# Patient Record
Sex: FEMALE | Race: BLACK OR AFRICAN AMERICAN | Employment: PART TIME | URBAN - METROPOLITAN AREA
[De-identification: names, ages, dates, MRNs, and addresses within clinical notes are randomized per-mention and may not be internally consistent; named-entity substitution may affect disease eponyms.]

---

## 2020-08-10 ENCOUNTER — HOSPITAL ENCOUNTER (OUTPATIENT)
Age: 30
Setting detail: SPECIMEN
Discharge: HOME | End: 2020-08-10
Payer: MEDICARE

## 2020-08-10 LAB
-: ABNORMAL
ABO/RH: NORMAL
ABSOLUTE EOS #: 0.06 K/UL (ref 0–0.44)
ABSOLUTE IMMATURE GRANULOCYTE: 0.03 K/UL (ref 0–0.3)
ABSOLUTE LYMPH #: 1.27 K/UL (ref 1.1–3.7)
ABSOLUTE MONO #: 0.53 K/UL (ref 0.1–1.2)
AMORPHOUS: ABNORMAL
AMPHETAMINE SCREEN URINE: NEGATIVE
ANTIBODY SCREEN: NEGATIVE
BACTERIA: ABNORMAL
BARBITURATE SCREEN URINE: NEGATIVE
BASOPHILS # BLD: 0 % (ref 0–2)
BASOPHILS ABSOLUTE: <0.03 K/UL (ref 0–0.2)
BENZODIAZEPINE SCREEN, URINE: NEGATIVE
BILIRUBIN URINE: NEGATIVE
BUPRENORPHINE URINE: NORMAL
CANNABINOID SCREEN URINE: NEGATIVE
CASTS UA: ABNORMAL /LPF (ref 0–2)
COCAINE METABOLITE, URINE: NEGATIVE
COLOR: ABNORMAL
COMMENT UA: ABNORMAL
CRYSTALS, UA: ABNORMAL /HPF
DIFFERENTIAL TYPE: ABNORMAL
EOSINOPHILS RELATIVE PERCENT: 1 % (ref 1–4)
EPITHELIAL CELLS UA: ABNORMAL /HPF (ref 0–5)
GLUCOSE URINE: NEGATIVE
HCT VFR BLD CALC: 37.2 % (ref 36.3–47.1)
HEMOGLOBIN: 12.5 G/DL (ref 11.9–15.1)
HEPATITIS B SURFACE ANTIGEN: NONREACTIVE
IMMATURE GRANULOCYTES: 1 %
KETONES, URINE: NEGATIVE
LEUKOCYTE ESTERASE, URINE: ABNORMAL
LYMPHOCYTES # BLD: 21 % (ref 24–43)
MCH RBC QN AUTO: 28.2 PG (ref 25.2–33.5)
MCHC RBC AUTO-ENTMCNC: 33.6 G/DL (ref 28.4–34.8)
MCV RBC AUTO: 83.8 FL (ref 82.6–102.9)
MDMA URINE: NORMAL
METHADONE SCREEN, URINE: NEGATIVE
METHAMPHETAMINE, URINE: NORMAL
MONOCYTES # BLD: 9 % (ref 3–12)
MUCUS: ABNORMAL
NITRITE, URINE: NEGATIVE
NRBC AUTOMATED: 0 PER 100 WBC
OPIATES, URINE: NEGATIVE
OTHER OBSERVATIONS UA: ABNORMAL
OXYCODONE SCREEN URINE: NEGATIVE
PDW BLD-RTO: 13.7 % (ref 11.8–14.4)
PH UA: 7 (ref 5–8)
PHENCYCLIDINE, URINE: NEGATIVE
PLATELET # BLD: 309 K/UL (ref 138–453)
PLATELET ESTIMATE: ABNORMAL
PMV BLD AUTO: 10.8 FL (ref 8.1–13.5)
PROPOXYPHENE, URINE: NORMAL
PROTEIN UA: ABNORMAL
RBC # BLD: 4.44 M/UL (ref 3.95–5.11)
RBC # BLD: ABNORMAL 10*6/UL
RBC UA: ABNORMAL /HPF (ref 0–2)
RENAL EPITHELIAL, UA: ABNORMAL /HPF
RUBV IGG SER QL: 15.8 IU/ML
SEG NEUTROPHILS: 68 % (ref 36–65)
SEGMENTED NEUTROPHILS ABSOLUTE COUNT: 4.09 K/UL (ref 1.5–8.1)
SPECIFIC GRAVITY UA: 1.02 (ref 1–1.03)
T. PALLIDUM, IGG: NONREACTIVE
TEST INFORMATION: NORMAL
TRICHOMONAS: ABNORMAL
TRICYCLIC ANTIDEPRESSANTS, UR: NORMAL
TURBIDITY: ABNORMAL
URINE HGB: NEGATIVE
UROBILINOGEN, URINE: NORMAL
WBC # BLD: 6 K/UL (ref 3.5–11.3)
WBC # BLD: ABNORMAL 10*3/UL
WBC UA: ABNORMAL /HPF (ref 0–5)
YEAST: ABNORMAL

## 2020-08-11 LAB
CULTURE: NORMAL
Lab: NORMAL
SPECIMEN DESCRIPTION: NORMAL

## 2020-08-13 ENCOUNTER — HOSPITAL ENCOUNTER (OUTPATIENT)
Age: 30
Setting detail: SPECIMEN
Discharge: HOME | End: 2020-08-13
Payer: MEDICARE

## 2020-08-15 LAB
HPV SAMPLE: ABNORMAL
HPV, GENOTYPE 16: NOT DETECTED
HPV, GENOTYPE 18: NOT DETECTED
HPV, HIGH RISK OTHER: DETECTED
HPV, INTERPRETATION: ABNORMAL
SPECIMEN DESCRIPTION: ABNORMAL

## 2020-08-16 LAB
CHLAMYDIA BY THIN PREP: NEGATIVE
N. GONORRHOEAE DNA, THIN PREP: NEGATIVE
SPECIMEN DESCRIPTION: NORMAL

## 2020-08-20 LAB — CYTOLOGY REPORT: NORMAL

## 2020-08-24 LAB
SEND OUT REPORT: NORMAL
TEST NAME: NORMAL

## 2020-09-01 ENCOUNTER — TELEPHONE (OUTPATIENT)
Dept: OBGYN CLINIC | Age: 30
End: 2020-09-01

## 2020-09-01 NOTE — TELEPHONE ENCOUNTER
Please call patient - her pap results are abnormal.  Cytology negative, positive for HPV but negative for 16/18 types.   Therefore we can repeat cotesting in 12 months

## 2020-09-16 ENCOUNTER — ROUTINE PRENATAL (OUTPATIENT)
Dept: OBGYN CLINIC | Age: 30
End: 2020-09-16
Payer: MEDICARE

## 2020-09-16 VITALS — DIASTOLIC BLOOD PRESSURE: 92 MMHG | WEIGHT: 204 LBS | SYSTOLIC BLOOD PRESSURE: 134 MMHG

## 2020-09-16 PROCEDURE — 1036F TOBACCO NON-USER: CPT | Performed by: OBSTETRICS & GYNECOLOGY

## 2020-09-16 PROCEDURE — G8421 BMI NOT CALCULATED: HCPCS | Performed by: OBSTETRICS & GYNECOLOGY

## 2020-09-16 PROCEDURE — 99213 OFFICE O/P EST LOW 20 MIN: CPT | Performed by: OBSTETRICS & GYNECOLOGY

## 2020-09-16 PROCEDURE — G8427 DOCREV CUR MEDS BY ELIG CLIN: HCPCS | Performed by: OBSTETRICS & GYNECOLOGY

## 2020-09-16 NOTE — PROGRESS NOTES
Mounika Jasmine is here at 16w0d for:    Chief Complaint   Patient presents with    Routine Prenatal Visit     denies concerns. Estimated Due Date: Estimated Date of Delivery: 3/3/21    OB History    Para Term  AB Living   3 1 1   1     SAB TAB Ectopic Molar Multiple Live Births   1                # Outcome Date GA Lbr Eamon/2nd Weight Sex Delivery Anes PTL Lv   3 Current            2 SAB 2019           1 Term 12   7 lb 11 oz (3.487 kg) F Vag-Spont           Past Medical History:   Diagnosis Date    Cocaine abuse ([facility] [address]     Trichomonal vulvovaginitis        Past Surgical History:   Procedure Laterality Date    DILATION AND CURETTAGE OF UTERUS  2019       Social History     Tobacco Use   Smoking Status Never Smoker   Smokeless Tobacco Never Used        Social History     Substance and Sexual Activity   Alcohol Use Not Currently       No results found for this visit on 20. Vitals:  BP (!) 134/92   Wt 204 lb (92.5 kg)   There is no height or weight on file to calculate BMI.      HPI: here for new ob - states she is anxious d/t hx of miscarriage - will watch bp closely    Yes PT denies fever, chills, nausea and vomiting       Abdomen: enlarged, gravid     Results reviewed today:    No results found for this visit on 20. See prenatal vital sign section and fetal assessment section    ASSESSMENT & Plan    Diagnosis Orders   1. 16 weeks gestation of pregnancy               I am having Mounkia Jasmine maintain her Prenatal Vit-Fe Fumarate-FA (PRENATAL PO). Return in about 4 weeks (around 10/14/2020) for ob, usn. There are no Patient Instructions on file for this visit.           Aaron Amos,2020 10:50 AM

## 2020-10-08 ENCOUNTER — TELEPHONE (OUTPATIENT)
Dept: OBGYN CLINIC | Age: 30
End: 2020-10-08

## 2020-10-08 NOTE — TELEPHONE ENCOUNTER
Justin Neil requested usn be moved from 10-19 to 10-21 due to an abundance of patients on 10-19. I called ptKENNEY to call and RS.

## 2020-10-20 ENCOUNTER — TELEPHONE (OUTPATIENT)
Dept: OBGYN CLINIC | Age: 30
End: 2020-10-20

## 2020-10-20 NOTE — TELEPHONE ENCOUNTER
Left voicemail to remind patient of her upcoming ultrasound appointment on 10-21-20 at 2:00pm and a provider appointment at 2:45pm with [facility].

## 2020-10-21 ENCOUNTER — ROUTINE PRENATAL (OUTPATIENT)
Dept: OBGYN CLINIC | Age: 30
End: 2020-10-21
Payer: MEDICARE

## 2020-10-21 VITALS — SYSTOLIC BLOOD PRESSURE: 124 MMHG | WEIGHT: 207 LBS | DIASTOLIC BLOOD PRESSURE: 80 MMHG

## 2020-10-21 PROCEDURE — 99213 OFFICE O/P EST LOW 20 MIN: CPT | Performed by: NURSE PRACTITIONER

## 2020-10-28 ENCOUNTER — HOSPITAL ENCOUNTER (OUTPATIENT)
Age: 30
Discharge: HOME | End: 2020-10-28
Attending: MIDWIFE | Admitting: MIDWIFE
Payer: MEDICARE

## 2020-10-28 ENCOUNTER — TELEPHONE (OUTPATIENT)
Dept: OBGYN | Age: 30
End: 2020-10-28

## 2020-10-28 VITALS
RESPIRATION RATE: 16 BRPM | SYSTOLIC BLOOD PRESSURE: 141 MMHG | TEMPERATURE: 98 F | DIASTOLIC BLOOD PRESSURE: 84 MMHG | HEART RATE: 71 BPM

## 2020-10-28 PROBLEM — R10.9 ABDOMINAL PAIN: Status: ACTIVE | Noted: 2020-10-28

## 2020-10-28 PROCEDURE — 85460 HEMOGLOBIN FETAL: CPT

## 2020-10-28 ASSESSMENT — PAIN DESCRIPTION - DESCRIPTORS: DESCRIPTORS: SHARP

## 2020-10-28 NOTE — LETTER
[address] [facility] and Delivery  [facility] [address]  Phone: [phone]          October 28, 2020     Patient: Neetu Rodriguez   YOB: 1990   Date of Visit: 10/28/2020       To Whom it May Concern:    Neetu Rodriguez was seen in my clinic on 10/28/2020. She may return to school on 11/30/2020. .    If you have any questions or concerns, please don't hesitate to call.     Sincerely,     [address] OB Department    Ordered by Boogie Patel CNM

## 2020-10-28 NOTE — FLOWSHEET NOTE
Phoned KWASI Griffin CNM with report of fall, BP results, abraded area on abdomen, no leaking bleeding or contractions. See Orders. KWASI Griffin updated B FREDERICK jones who was by chance in the department and no other orders received.

## 2020-10-29 ENCOUNTER — ROUTINE PRENATAL (OUTPATIENT)
Dept: OBGYN CLINIC | Age: 30
End: 2020-10-29
Payer: MEDICARE

## 2020-10-29 VITALS
DIASTOLIC BLOOD PRESSURE: 82 MMHG | WEIGHT: 209 LBS | SYSTOLIC BLOOD PRESSURE: 148 MMHG | BODY MASS INDEX: 31.67 KG/M2 | HEIGHT: 68 IN

## 2020-10-29 LAB
% FETAL BLEED: NORMAL %
FETAL BLEED VOLUME: NORMAL ML
RHOGAM DOSES REQUIRED: NORMAL

## 2020-10-29 PROCEDURE — G8484 FLU IMMUNIZE NO ADMIN: HCPCS | Performed by: OBSTETRICS & GYNECOLOGY

## 2020-10-29 PROCEDURE — G8419 CALC BMI OUT NRM PARAM NOF/U: HCPCS | Performed by: OBSTETRICS & GYNECOLOGY

## 2020-10-29 PROCEDURE — 99213 OFFICE O/P EST LOW 20 MIN: CPT | Performed by: OBSTETRICS & GYNECOLOGY

## 2020-10-29 PROCEDURE — G8427 DOCREV CUR MEDS BY ELIG CLIN: HCPCS | Performed by: OBSTETRICS & GYNECOLOGY

## 2020-10-29 PROCEDURE — 1036F TOBACCO NON-USER: CPT | Performed by: OBSTETRICS & GYNECOLOGY

## 2020-10-29 ASSESSMENT — ENCOUNTER SYMPTOMS: ABDOMINAL PAIN: 1

## 2020-10-29 NOTE — PROGRESS NOTES
DATE OF VISIT:  10/29/20    PATIENT NAME:  Popeye Rings OF BIRTH: 1990    REASON FOR VISIT:    Chief Complaint   Patient presents with    Follow-up     Pt. fell yesterday. She was seen yesterday at [facility]. She was standing on a chair changing a smoke detector and fell. Her stomach took a direct her to the back of the chair. Abdomen feels tender/sore. HISTORY OF PRESENT ILLNESS:  Pt fell yesterday off a chair - she states she fell onto the back of the chair with her abdomen; no vag bleeding; no LOF; no contractions; KB from L&D still pending      No LMP recorded. Patient is pregnant. Vitals:    10/29/20 1107 10/29/20 1112   BP: (!) 150/88 (!) 148/82   Weight: 209 lb (94.8 kg)    Height: 5' 8\" (1.727 m)      Body mass index is 31.78 kg/m². No Known Allergies  Current Outpatient Medications   Medication Sig Dispense Refill    Prenatal Vit-Fe Fumarate-FA (PRENATAL PO) Take by mouth       No current facility-administered medications for this visit.       Social History     Socioeconomic History    Marital status: Single     Spouse name: Not on file    Number of children: Not on file    Years of education: Not on file    Highest education level: Not on file   Occupational History    Not on file   Social Needs    Financial resource strain: Not on file    Food insecurity     Worry: Not on file     Inability: Not on file    Transportation needs     Medical: Not on file     Non-medical: Not on file   Tobacco Use    Smoking status: Never Smoker    Smokeless tobacco: Never Used   Substance and Sexual Activity    Alcohol use: Not Currently    Drug use: Never    Sexual activity: Yes   Lifestyle    Physical activity     Days per week: Not on file     Minutes per session: Not on file    Stress: Not on file   Relationships    Social connections     Talks on phone: Not on file     Gets together: Not on file     Attends [employer] service: Not on file     Active member of club or organization: Not on file     Attends meetings of clubs or organizations: Not on file     Relationship status: Not on file    Intimate partner violence     Fear of current or ex partner: Not on file     Emotionally abused: Not on file     Physically abused: Not on file     Forced sexual activity: Not on file   Other Topics Concern    Not on file   Social History Narrative    Not on file       REVIEW OF SYSTEMS:  Review of Systems   Gastrointestinal: Positive for abdominal pain (sore from fall). Genitourinary: Negative for pelvic pain, vaginal bleeding and vaginal discharge. PHYSICAL EXAM:  BP (!) 148/82   Ht 5' 8\" (1.727 m)   Wt 209 lb (94.8 kg)   BMI 31.78 kg/m²   Physical Exam  Constitutional:       Appearance: Normal appearance. HENT:      Head: Normocephalic and atraumatic. Eyes:      Extraocular Movements: Extraocular movements intact. Pupils: Pupils are equal, round, and reactive to light. Neck:      Musculoskeletal: Normal range of motion. Cardiovascular:      Rate and Rhythm: Normal rate. Pulmonary:      Effort: Pulmonary effort is normal.   Abdominal:      General: Abdomen is flat. Palpations: Abdomen is soft. Tenderness: There is no rebound. Musculoskeletal: Normal range of motion. Neurological:      Mental Status: She is alert and oriented to person, place, and time. Skin:     General: Skin is warm and dry. Psychiatric:         Mood and Affect: Mood normal.         Behavior: Behavior normal.         Thought Content: Thought content normal.         Judgment: Judgment normal.         ASSESSMENT:      1. Fall, subsequent encounter    2. 22 weeks gestation of pregnancy        PLAN:  No orders of the defined types were placed in this encounter. Return in about 2 weeks (around 11/12/2020) for as scheduled. Pt to call if bleeding or zach.     Electronically signed by Radha Young DO on 10/29/20

## 2020-11-10 ENCOUNTER — TELEPHONE (OUTPATIENT)
Dept: OBGYN CLINIC | Age: 30
End: 2020-11-10

## 2020-11-10 ENCOUNTER — HOSPITAL ENCOUNTER (OUTPATIENT)
Age: 30
Setting detail: SPECIMEN
Discharge: HOME | End: 2020-11-10
Payer: MEDICARE

## 2020-11-10 ENCOUNTER — ROUTINE PRENATAL (OUTPATIENT)
Dept: OBGYN CLINIC | Age: 30
End: 2020-11-10
Payer: MEDICARE

## 2020-11-10 VITALS — DIASTOLIC BLOOD PRESSURE: 86 MMHG | WEIGHT: 210.6 LBS | BODY MASS INDEX: 32.02 KG/M2 | SYSTOLIC BLOOD PRESSURE: 138 MMHG

## 2020-11-10 LAB
ABSOLUTE EOS #: 0.1 K/UL (ref 0–0.44)
ABSOLUTE IMMATURE GRANULOCYTE: 0.04 K/UL (ref 0–0.3)
ABSOLUTE LYMPH #: 1.54 K/UL (ref 1.1–3.7)
ABSOLUTE MONO #: 0.47 K/UL (ref 0.1–1.2)
ALBUMIN SERPL-MCNC: 3.6 G/DL (ref 3.5–5.2)
ALBUMIN/GLOBULIN RATIO: 1.3 (ref 1–2.5)
ALP BLD-CCNC: 81 U/L (ref 35–104)
ALT SERPL-CCNC: 17 U/L (ref 5–33)
ANION GAP SERPL CALCULATED.3IONS-SCNC: 12 MMOL/L (ref 9–17)
AST SERPL-CCNC: 20 U/L
BASOPHILS # BLD: 0 % (ref 0–2)
BASOPHILS ABSOLUTE: <0.03 K/UL (ref 0–0.2)
BILIRUB SERPL-MCNC: 0.3 MG/DL (ref 0.3–1.2)
BUN BLDV-MCNC: 4 MG/DL (ref 6–20)
BUN/CREAT BLD: ABNORMAL (ref 9–20)
CALCIUM SERPL-MCNC: 8.8 MG/DL (ref 8.6–10.4)
CHLORIDE BLD-SCNC: 102 MMOL/L (ref 98–107)
CO2: 25 MMOL/L (ref 20–31)
CREAT SERPL-MCNC: 0.47 MG/DL (ref 0.5–0.9)
CREATININE URINE: 111.7 MG/DL (ref 28–217)
DIFFERENTIAL TYPE: ABNORMAL
EOSINOPHILS RELATIVE PERCENT: 2 % (ref 1–4)
GFR AFRICAN AMERICAN: >60 ML/MIN
GFR NON-AFRICAN AMERICAN: >60 ML/MIN
GFR SERPL CREATININE-BSD FRML MDRD: ABNORMAL ML/MIN/{1.73_M2}
GFR SERPL CREATININE-BSD FRML MDRD: ABNORMAL ML/MIN/{1.73_M2}
GLUCOSE BLD-MCNC: 65 MG/DL (ref 70–99)
HCT VFR BLD CALC: 33 % (ref 36.3–47.1)
HEMOGLOBIN: 10.6 G/DL (ref 11.9–15.1)
IMMATURE GRANULOCYTES: 1 %
LYMPHOCYTES # BLD: 25 % (ref 24–43)
MCH RBC QN AUTO: 28.7 PG (ref 25.2–33.5)
MCHC RBC AUTO-ENTMCNC: 32.1 G/DL (ref 28.4–34.8)
MCV RBC AUTO: 89.4 FL (ref 82.6–102.9)
MONOCYTES # BLD: 8 % (ref 3–12)
NRBC AUTOMATED: 0 PER 100 WBC
PDW BLD-RTO: 14.8 % (ref 11.8–14.4)
PLATELET # BLD: 247 K/UL (ref 138–453)
PLATELET ESTIMATE: ABNORMAL
PMV BLD AUTO: 11.7 FL (ref 8.1–13.5)
POTASSIUM SERPL-SCNC: 3.3 MMOL/L (ref 3.7–5.3)
RBC # BLD: 3.69 M/UL (ref 3.95–5.11)
RBC # BLD: ABNORMAL 10*6/UL
SEG NEUTROPHILS: 64 % (ref 36–65)
SEGMENTED NEUTROPHILS ABSOLUTE COUNT: 3.9 K/UL (ref 1.5–8.1)
SODIUM BLD-SCNC: 139 MMOL/L (ref 135–144)
TOTAL PROTEIN, URINE: 29 MG/DL
TOTAL PROTEIN: 6.3 G/DL (ref 6.4–8.3)
URIC ACID: 3.5 MG/DL (ref 2.4–5.7)
URINE TOTAL PROTEIN CREATININE RATIO: 0.26 (ref 0–0.2)
WBC # BLD: 6.1 K/UL (ref 3.5–11.3)
WBC # BLD: ABNORMAL 10*3/UL

## 2020-11-10 PROCEDURE — 36415 COLL VENOUS BLD VENIPUNCTURE: CPT | Performed by: NURSE PRACTITIONER

## 2020-11-10 PROCEDURE — 99213 OFFICE O/P EST LOW 20 MIN: CPT | Performed by: NURSE PRACTITIONER

## 2020-11-10 RX ORDER — LABETALOL 100 MG/1
100 TABLET, FILM COATED ORAL DAILY
Qty: 30 TABLET | Refills: 1 | Status: SHIPPED | OUTPATIENT
Start: 2020-11-10 | End: 2020-12-23

## 2020-11-10 NOTE — PROGRESS NOTES
Manish Calhoun is a 27 y.o. female 24w9d    The patient was seen and evaluated. There was positive fetal movements. No contractions or leakage of fluid. Signs and symptoms of  labor as well as labor were reviewed. The S/S of Pre-Eclampsia were reviewed with the patient in detail. She is to report any of these if they occur. She currently denies any of these. The patient is RH positive Rhogam Ordered no    The patient was instructed on fetal kick counts and was encouraged to monitor every 8 hours. This is to begin at 28 weeks gestation. She was instructed that the baby should move at a minimum of ten times within one hour after a meal. The patient was instructed to lay down on her left side twenty minutes after eating and count movements for up to one hour with a target value of ten movements. She was instructed to notify the office if she did not make that target after two attempts or if after any attempt there was less than four movements. No Patient Care Coordination Note on file. Assessment:  1. Manish Calhoun is a 27 y.o. female  2.   3. 23w6d    Patient Active Problem List    Diagnosis Date Noted    Abdominal pain 10/28/2020        Diagnosis Orders   1. Chronic hypertension affecting pregnancy  CBC Auto Differential    Comprehensive Metabolic Panel    Uric Acid    Protein / creatinine ratio, urine    labetalol (NORMODYNE) 100 MG tablet   2. 23 weeks gestation of pregnancy       Patient with multiple elevated BP readings. Discussed CHTN and starting labetalol 100mg daily and BP check in 1 week. Will start weekly NST & BPP at 32 weeks, growth q4 weeks and delivery between 37-38 weeks. Recommended bASA daily. Will complete [facility] labs today. Case/reviewed with Dr. Meet Beasley and she agrees     Plan:  The patient will return to the office for her next visit in 4 weeks. See antepartum flow sheet.

## 2020-11-16 RX ORDER — FERROUS SULFATE 325(65) MG
325 TABLET ORAL
Qty: 90 TABLET | Refills: 3 | Status: ON HOLD | OUTPATIENT
Start: 2020-11-16 | End: 2021-02-04 | Stop reason: HOSPADM

## 2020-11-17 ENCOUNTER — OFFICE VISIT (OUTPATIENT)
Dept: OBGYN CLINIC | Age: 30
End: 2020-11-17
Payer: MEDICARE

## 2020-11-17 VITALS — SYSTOLIC BLOOD PRESSURE: 128 MMHG | DIASTOLIC BLOOD PRESSURE: 82 MMHG

## 2020-11-17 PROCEDURE — 99211 OFF/OP EST MAY X REQ PHY/QHP: CPT | Performed by: NURSE PRACTITIONER

## 2020-11-17 NOTE — PROGRESS NOTES
Here for BP check. No complaints today. Taking Labetalol 100mg daily. Denies h/a, visual disturbances, n/v, swelling. BP today 128/82. M Dolores Hserin aware and pt to continue Labetalol as prescribed and keep next scheduled appt. Will call office with any new s/s of increased BP.

## 2020-12-09 ENCOUNTER — ROUTINE PRENATAL (OUTPATIENT)
Dept: OBGYN CLINIC | Age: 30
End: 2020-12-09
Payer: MEDICARE

## 2020-12-09 ENCOUNTER — HOSPITAL ENCOUNTER (OUTPATIENT)
Age: 30
Setting detail: SPECIMEN
Discharge: HOME | End: 2020-12-09
Payer: MEDICARE

## 2020-12-09 VITALS — BODY MASS INDEX: 32.54 KG/M2 | SYSTOLIC BLOOD PRESSURE: 146 MMHG | WEIGHT: 214 LBS | DIASTOLIC BLOOD PRESSURE: 64 MMHG

## 2020-12-09 LAB
ABSOLUTE EOS #: 0.08 K/UL (ref 0–0.44)
ABSOLUTE IMMATURE GRANULOCYTE: <0.03 K/UL (ref 0–0.3)
ABSOLUTE LYMPH #: 1.17 K/UL (ref 1.1–3.7)
ABSOLUTE MONO #: 0.39 K/UL (ref 0.1–1.2)
ALBUMIN SERPL-MCNC: 3.2 G/DL (ref 3.5–5.2)
ALBUMIN/GLOBULIN RATIO: 1.1 (ref 1–2.5)
ALP BLD-CCNC: 88 U/L (ref 35–104)
ALT SERPL-CCNC: 14 U/L (ref 5–33)
ANION GAP SERPL CALCULATED.3IONS-SCNC: 14 MMOL/L (ref 9–17)
AST SERPL-CCNC: 16 U/L
BASOPHILS # BLD: 0 % (ref 0–2)
BASOPHILS ABSOLUTE: <0.03 K/UL (ref 0–0.2)
BILIRUB SERPL-MCNC: 0.23 MG/DL (ref 0.3–1.2)
BUN BLDV-MCNC: 4 MG/DL (ref 6–20)
BUN/CREAT BLD: ABNORMAL (ref 9–20)
CALCIUM SERPL-MCNC: 8.2 MG/DL (ref 8.6–10.4)
CHLORIDE BLD-SCNC: 103 MMOL/L (ref 98–107)
CO2: 25 MMOL/L (ref 20–31)
CREAT SERPL-MCNC: 0.42 MG/DL (ref 0.5–0.9)
DIFFERENTIAL TYPE: ABNORMAL
EOSINOPHILS RELATIVE PERCENT: 2 % (ref 1–4)
GFR AFRICAN AMERICAN: >60 ML/MIN
GFR NON-AFRICAN AMERICAN: >60 ML/MIN
GFR SERPL CREATININE-BSD FRML MDRD: ABNORMAL ML/MIN/{1.73_M2}
GFR SERPL CREATININE-BSD FRML MDRD: ABNORMAL ML/MIN/{1.73_M2}
GLUCOSE ADMINISTRATION: NORMAL
GLUCOSE BLD-MCNC: 101 MG/DL (ref 70–99)
GLUCOSE TOLERANCE SCREEN 50G: 102 MG/DL (ref 70–135)
HCT VFR BLD CALC: 33 % (ref 36.3–47.1)
HEMOGLOBIN: 10.5 G/DL (ref 11.9–15.1)
IMMATURE GRANULOCYTES: 0 %
LYMPHOCYTES # BLD: 24 % (ref 24–43)
MCH RBC QN AUTO: 28.3 PG (ref 25.2–33.5)
MCHC RBC AUTO-ENTMCNC: 31.8 G/DL (ref 28.4–34.8)
MCV RBC AUTO: 88.9 FL (ref 82.6–102.9)
MONOCYTES # BLD: 8 % (ref 3–12)
NRBC AUTOMATED: 0 PER 100 WBC
PDW BLD-RTO: 14.1 % (ref 11.8–14.4)
PLATELET # BLD: 213 K/UL (ref 138–453)
PLATELET ESTIMATE: ABNORMAL
PMV BLD AUTO: 12.1 FL (ref 8.1–13.5)
POTASSIUM SERPL-SCNC: 3 MMOL/L (ref 3.7–5.3)
RBC # BLD: 3.71 M/UL (ref 3.95–5.11)
RBC # BLD: ABNORMAL 10*6/UL
SEG NEUTROPHILS: 66 % (ref 36–65)
SEGMENTED NEUTROPHILS ABSOLUTE COUNT: 3.29 K/UL (ref 1.5–8.1)
SODIUM BLD-SCNC: 142 MMOL/L (ref 135–144)
TOTAL PROTEIN: 6 G/DL (ref 6.4–8.3)
WBC # BLD: 5 K/UL (ref 3.5–11.3)
WBC # BLD: ABNORMAL 10*3/UL

## 2020-12-09 PROCEDURE — G8427 DOCREV CUR MEDS BY ELIG CLIN: HCPCS | Performed by: OBSTETRICS & GYNECOLOGY

## 2020-12-09 PROCEDURE — G8484 FLU IMMUNIZE NO ADMIN: HCPCS | Performed by: OBSTETRICS & GYNECOLOGY

## 2020-12-09 PROCEDURE — 99213 OFFICE O/P EST LOW 20 MIN: CPT | Performed by: OBSTETRICS & GYNECOLOGY

## 2020-12-09 PROCEDURE — 1036F TOBACCO NON-USER: CPT | Performed by: OBSTETRICS & GYNECOLOGY

## 2020-12-09 PROCEDURE — G8419 CALC BMI OUT NRM PARAM NOF/U: HCPCS | Performed by: OBSTETRICS & GYNECOLOGY

## 2020-12-09 NOTE — PROGRESS NOTES
Afshan Peters is a 27 y.o. female 31w0d    The patient was seen and evaluated. There was positive fetal movements. No contractions or leakage of fluid. Signs and symptoms of  labor as well as labor were reviewed. The S/S of Pre-Eclampsia were reviewed with the patient in detail. She is to report any of these if they occur. She currently denies any of these. The patient had her 28 week labs in process. Hospital Outpatient Visit on 11/10/2020   Component Date Value Ref Range Status    Uric Acid 11/10/2020 3.5  2.4 - 5.7 mg/dL Final    Glucose 11/10/2020 65* 70 - 99 mg/dL Final    BUN 11/10/2020 4* 6 - 20 mg/dL Final    CREATININE 11/10/2020 0.47* 0.50 - 0.90 mg/dL Final    Bun/Cre Ratio 11/10/2020 NOT REPORTED   Final    Calcium 11/10/2020 8.8  8.6 - 10.4 mg/dL Final    Sodium 11/10/2020 139  135 - 144 mmol/L Final    Potassium 11/10/2020 3.3* 3.7 - 5.3 mmol/L Final    Chloride 11/10/2020 102  98 - 107 mmol/L Final    CO2 11/10/2020 25  20 - 31 mmol/L Final    Anion Gap 11/10/2020 12  9 - 17 mmol/L Final    Alkaline Phosphatase 11/10/2020 81  35 - 104 U/L Final    ALT 11/10/2020 17  5 - 33 U/L Final    AST 11/10/2020 20  <32 U/L Final    Total Bilirubin 11/10/2020 0.30  0.3 - 1.2 mg/dL Final    Total Protein 11/10/2020 6.3* 6.4 - 8.3 g/dL Final    Alb 11/10/2020 3.6  3.5 - 5.2 g/dL Final    Albumin/Globulin Ratio 11/10/2020 1.3  1.0 - 2.5 Final    GFR Non- 11/10/2020 >60  >60 mL/min Final    GFR  11/10/2020 >60  >60 mL/min Final    GFR Comment 11/10/2020        Final    Comment: Average GFR for 30-36 years old:   80 mL/min/1.73sq m  Chronic Kidney Disease:   <60 mL/min/1.73sq m  Kidney failure:   <15 mL/min/1.73sq m              eGFR calculated using average adult body mass.  Additional eGFR calculator available at:        [email or web address]            GFR Staging 11/10/2020 NOT REPORTED   Final    WBC 11/10/2020 6.1  3.5 - 11.3 k/uL Final    RBC 11/10/2020 3.69* 3.95 - 5.11 m/uL Final    Hemoglobin 11/10/2020 10.6* 11.9 - 15.1 g/dL Final    Hematocrit 11/10/2020 33.0* 36.3 - 47.1 % Final    MCV 11/10/2020 89.4  82.6 - 102.9 fL Final    MCH 11/10/2020 28.7  25.2 - 33.5 pg Final    MCHC 11/10/2020 32.1  28.4 - 34.8 g/dL Final    RDW 11/10/2020 14.8* 11.8 - 14.4 % Final    Platelets 87/37/7416 247  138 - 453 k/uL Final    MPV 11/10/2020 11.7  8.1 - 13.5 fL Final    NRBC Automated 11/10/2020 0.0  0.0 per 100 WBC Final    Differential Type 11/10/2020 NOT REPORTED   Final    Seg Neutrophils 11/10/2020 64  36 - 65 % Final    Lymphocytes 11/10/2020 25  24 - 43 % Final    Monocytes 11/10/2020 8  3 - 12 % Final    Eosinophils % 11/10/2020 2  1 - 4 % Final    Basophils 11/10/2020 0  0 - 2 % Final    Immature Granulocytes 11/10/2020 1* 0 % Final    Segs Absolute 11/10/2020 3.90  1.50 - 8.10 k/uL Final    Absolute Lymph # 11/10/2020 1.54  1.10 - 3.70 k/uL Final    Absolute Mono # 11/10/2020 0.47  0.10 - 1.20 k/uL Final    Absolute Eos # 11/10/2020 0.10  0.00 - 0.44 k/uL Final    Basophils Absolute 11/10/2020 <0.03  0.00 - 0.20 k/uL Final    Absolute Immature Granulocyte 11/10/2020 0.04  0.00 - 0.30 k/uL Final    WBC Morphology 11/10/2020 NOT REPORTED   Final    RBC Morphology 11/10/2020 ANISOCYTOSIS PRESENT   Final    Platelet Estimate 35/56/4109 NOT REPORTED   Final    Total Protein, Urine 11/10/2020 29  mg/dL Final    No normal range established.  Creatinine, Ur 11/10/2020 111.7  28.0 - 217.0 mg/dL Final    Urine Total Protein Creatinine Rat* 11/10/2020 0.26* 0.00 - 0.20 Final       No Patient Care Coordination Note on file. T-Dap Vaccine (27-36 weeks): awaiting    The patient was instructed on fetal kick counts and is encouraged to monitor every 8 hours.  She was instructed that the baby should move at a minimum of ten times within one hour after a meal. The patient was instructed to lay down on her left side twenty minutes after eating and count movements for up to one hour with a target value of ten movements. She was instructed to notify the office if she did not make that target after two attempts or if after any attempt there was less than four movements. The patient reports that the fetal movement targets have been made Yes.  Testing: Will need as bp is elevated - getting cbc and cmp with gtt today    Assessment:  1Denis Bowden is a 27 y.o. female  2.   3. 28w0d    Patient Active Problem List    Diagnosis Date Noted    Abdominal pain 10/28/2020        Diagnosis Orders   1. 28 weeks gestation of pregnancy  CBC With Auto Differential    Glucose Tolerance, 1 Hr         Plan:  The patient will return to the office for her next visit in 2 weeks. See antepartum flow sheet. Visits will continue every 2 weeks in the third trimester. At 36 weeks will have GBS swab performed and begin weekly visits.      Testing Indicated: Yes  Scheduled after 32 weeks

## 2020-12-21 ENCOUNTER — TELEPHONE (OUTPATIENT)
Dept: OBGYN CLINIC | Age: 30
End: 2020-12-21

## 2020-12-22 ENCOUNTER — TELEPHONE (OUTPATIENT)
Dept: OBGYN CLINIC | Age: 30
End: 2020-12-22

## 2020-12-23 ENCOUNTER — ROUTINE PRENATAL (OUTPATIENT)
Dept: OBGYN CLINIC | Age: 30
End: 2020-12-23
Payer: MEDICARE

## 2020-12-23 VITALS — WEIGHT: 211.4 LBS | BODY MASS INDEX: 32.14 KG/M2 | DIASTOLIC BLOOD PRESSURE: 82 MMHG | SYSTOLIC BLOOD PRESSURE: 140 MMHG

## 2020-12-23 PROCEDURE — G8484 FLU IMMUNIZE NO ADMIN: HCPCS | Performed by: ADVANCED PRACTICE MIDWIFE

## 2020-12-23 PROCEDURE — G8419 CALC BMI OUT NRM PARAM NOF/U: HCPCS | Performed by: ADVANCED PRACTICE MIDWIFE

## 2020-12-23 PROCEDURE — 1036F TOBACCO NON-USER: CPT | Performed by: ADVANCED PRACTICE MIDWIFE

## 2020-12-23 PROCEDURE — G8427 DOCREV CUR MEDS BY ELIG CLIN: HCPCS | Performed by: ADVANCED PRACTICE MIDWIFE

## 2020-12-23 PROCEDURE — 99213 OFFICE O/P EST LOW 20 MIN: CPT | Performed by: ADVANCED PRACTICE MIDWIFE

## 2020-12-23 RX ORDER — LABETALOL 100 MG/1
100 TABLET, FILM COATED ORAL 2 TIMES DAILY
Qty: 60 TABLET | Refills: 2 | Status: ON HOLD | OUTPATIENT
Start: 2020-12-23 | End: 2021-01-16 | Stop reason: HOSPADM

## 2020-12-23 RX ORDER — VITAMIN A, ASCORBIC ACID, CHOLECALCIFEROL, TOCOPHEROL, THIAMINE MONONITRATE, RIBOFLAVIN, PYRIDOXINE, FOLIC ACID, CYANOCOBALAMIN, CALCIUM CARBONATE, FERROUS FUMARATE, ZINC OXIDE, CUPRIC OXIDE, NIACINAMIDE, AND FISH OIL 27-1-250MG
KIT ORAL
COMMUNITY
Start: 2020-12-08 | End: 2021-02-22

## 2020-12-23 NOTE — PROGRESS NOTES
Karissa Gordon is a 27 y.o. female 30w0d    The patient was seen and evaluated. There was positive fetal movements. No contractions or leakage of fluid. Signs and symptoms of  labor as well as labor were reviewed. The S/S of Pre-Eclampsia were reviewed with the patient in detail. She is to report any of these if they occur. She currently denies any of these. Currently taking Labetalol 100mg PO daily - will increase to Labetalol 100mg PO BID - patient understanding and Dr Kathryn Yen consulted and is agreeable. The patient had her 28 week labs completed.   Hospital Outpatient Visit on 2020   Component Date Value Ref Range Status    GLU ADMN 2020 Glucola   Final    Glucose tolerance screen 50g 2020 102  70 - 135 mg/dL Final    WBC 2020 5.0  3.5 - 11.3 k/uL Final    RBC 2020 3.71* 3.95 - 5.11 m/uL Final    Hemoglobin 2020 10.5* 11.9 - 15.1 g/dL Final    Hematocrit 2020 33.0* 36.3 - 47.1 % Final    MCV 2020 88.9  82.6 - 102.9 fL Final    MCH 2020 28.3  25.2 - 33.5 pg Final    MCHC 2020 31.8  28.4 - 34.8 g/dL Final    RDW 2020 14.1  11.8 - 14.4 % Final    Platelets  213  138 - 453 k/uL Final    MPV 2020 12.1  8.1 - 13.5 fL Final    NRBC Automated 2020 0.0  0.0 per 100 WBC Final    Differential Type 2020 NOT REPORTED   Final    Seg Neutrophils 2020 66* 36 - 65 % Final    Lymphocytes 2020 24  24 - 43 % Final    Monocytes 2020 8  3 - 12 % Final    Eosinophils % 2020 2  1 - 4 % Final    Basophils 2020 0  0 - 2 % Final    Immature Granulocytes 2020 0  0 % Final    Segs Absolute 2020 3.29  1.50 - 8.10 k/uL Final    Absolute Lymph # 2020 1.17  1.10 - 3.70 k/uL Final    Absolute Mono # 2020 0.39  0.10 - 1.20 k/uL Final    Absolute Eos # 2020 0.08  0.00 - 0.44 k/uL Final    Basophils Absolute 2020 <0.03  0.00 - 0.20 k/uL Final    Absolute Immature Granulocyte 12/09/2020 <0.03  0.00 - 0.30 k/uL Final    WBC Morphology 12/09/2020 NOT REPORTED   Final    RBC Morphology 12/09/2020 NOT REPORTED   Final    Platelet Estimate 56/71/3670 NOT REPORTED   Final    Glucose 12/09/2020 101* 70 - 99 mg/dL Final    BUN 12/09/2020 4* 6 - 20 mg/dL Final    CREATININE 12/09/2020 0.42* 0.50 - 0.90 mg/dL Final    Bun/Cre Ratio 12/09/2020 NOT REPORTED  9 - 20 Final    Calcium 12/09/2020 8.2* 8.6 - 10.4 mg/dL Final    Sodium 12/09/2020 142  135 - 144 mmol/L Final    Potassium 12/09/2020 3.0* 3.7 - 5.3 mmol/L Final    Chloride 12/09/2020 103  98 - 107 mmol/L Final    CO2 12/09/2020 25  20 - 31 mmol/L Final    Anion Gap 12/09/2020 14  9 - 17 mmol/L Final    Alkaline Phosphatase 12/09/2020 88  35 - 104 U/L Final    ALT 12/09/2020 14  5 - 33 U/L Final    AST 12/09/2020 16  <32 U/L Final    Total Bilirubin 12/09/2020 0.23* 0.3 - 1.2 mg/dL Final    Total Protein 12/09/2020 6.0* 6.4 - 8.3 g/dL Final    Alb 12/09/2020 3.2* 3.5 - 5.2 g/dL Final    Albumin/Globulin Ratio 12/09/2020 1.1  1.0 - 2.5 Final    GFR Non- 12/09/2020 >60  >60 mL/min Final    GFR  12/09/2020 >60  >60 mL/min Final    GFR Comment 12/09/2020        Final    Comment: Average GFR for 30-36 years old:   80 mL/min/1.73sq m  Chronic Kidney Disease:   <60 mL/min/1.73sq m  Kidney failure:   <15 mL/min/1.73sq m              eGFR calculated using average adult body mass. Additional eGFR calculator available at:        [employer].br            GFR Staging 12/09/2020 NOT REPORTED   Final       Wkly BPP/NST at 32 weeks  Growth at 34 weeks  Del at 37 weeks      T-Dap Vaccine (27-36 weeks): completed    The patient was instructed on fetal kick counts and is encouraged to monitor every 8 hours.  She was instructed that the baby should move at a minimum of ten times within one hour after a meal. The patient was instructed to lay

## 2021-01-07 ENCOUNTER — ROUTINE PRENATAL (OUTPATIENT)
Dept: OBGYN CLINIC | Age: 31
End: 2021-01-07
Payer: MEDICARE

## 2021-01-07 ENCOUNTER — PROCEDURE VISIT (OUTPATIENT)
Dept: OBGYN CLINIC | Age: 31
End: 2021-01-07
Payer: MEDICARE

## 2021-01-07 VITALS — DIASTOLIC BLOOD PRESSURE: 98 MMHG | BODY MASS INDEX: 32.39 KG/M2 | SYSTOLIC BLOOD PRESSURE: 140 MMHG | WEIGHT: 213 LBS

## 2021-01-07 VITALS — WEIGHT: 213 LBS | DIASTOLIC BLOOD PRESSURE: 74 MMHG | SYSTOLIC BLOOD PRESSURE: 138 MMHG | BODY MASS INDEX: 32.39 KG/M2

## 2021-01-07 DIAGNOSIS — O10.919 CHRONIC HYPERTENSION IN PREGNANCY: Primary | ICD-10-CM

## 2021-01-07 DIAGNOSIS — Z3A.32 32 WEEKS GESTATION OF PREGNANCY: ICD-10-CM

## 2021-01-07 DIAGNOSIS — O10.913 CHRONIC HYPERTENSION COMPLICATING OR REASON FOR CARE DURING PREGNANCY, THIRD TRIMESTER: Primary | ICD-10-CM

## 2021-01-07 PROCEDURE — G8427 DOCREV CUR MEDS BY ELIG CLIN: HCPCS | Performed by: ADVANCED PRACTICE MIDWIFE

## 2021-01-07 PROCEDURE — 59025 FETAL NON-STRESS TEST: CPT | Performed by: ADVANCED PRACTICE MIDWIFE

## 2021-01-07 PROCEDURE — G8484 FLU IMMUNIZE NO ADMIN: HCPCS | Performed by: ADVANCED PRACTICE MIDWIFE

## 2021-01-07 PROCEDURE — 1036F TOBACCO NON-USER: CPT | Performed by: ADVANCED PRACTICE MIDWIFE

## 2021-01-07 PROCEDURE — 99213 OFFICE O/P EST LOW 20 MIN: CPT | Performed by: ADVANCED PRACTICE MIDWIFE

## 2021-01-07 PROCEDURE — G8419 CALC BMI OUT NRM PARAM NOF/U: HCPCS | Performed by: ADVANCED PRACTICE MIDWIFE

## 2021-01-07 RX ORDER — BREAST PUMP
EACH MISCELLANEOUS
Qty: 1 EACH | Refills: 0 | Status: ON HOLD | OUTPATIENT
Start: 2021-01-07 | End: 2021-01-16 | Stop reason: HOSPADM

## 2021-01-07 RX ORDER — BREAST PUMP
EACH MISCELLANEOUS
Qty: 1 EACH | Refills: 0
Start: 2021-01-07 | End: 2021-01-07

## 2021-01-07 NOTE — PROGRESS NOTES
Citlali Canales is a 27 y.o. female 32w1d    The patient was seen and evaluated. There was positive fetal movements. No contractions or leakage of fluid. Signs and symptoms of  labor as well as labor were reviewed. The S/S of Pre-Eclampsia were reviewed with the patient in detail. She is to report any of these if they occur. She currently denies any of these. Has not taken Labetalol yet today. Last dose was last evening. Reports back pain - UTI ruled out, sounds muscular. Reviewed s/s to call office for. The patient had her 28 week labs completed.   Hospital Outpatient Visit on 2020   Component Date Value Ref Range Status    GLU ADMN 2020 Glucola   Final    Glucose tolerance screen 50g 2020 102  70 - 135 mg/dL Final    WBC 2020 5.0  3.5 - 11.3 k/uL Final    RBC 2020 3.71* 3.95 - 5.11 m/uL Final    Hemoglobin 2020 10.5* 11.9 - 15.1 g/dL Final    Hematocrit 2020 33.0* 36.3 - 47.1 % Final    MCV 2020 88.9  82.6 - 102.9 fL Final    MCH 2020 28.3  25.2 - 33.5 pg Final    MCHC 2020 31.8  28.4 - 34.8 g/dL Final    RDW 2020 14.1  11.8 - 14.4 % Final    Platelets  213  138 - 453 k/uL Final    MPV 2020 12.1  8.1 - 13.5 fL Final    NRBC Automated 2020 0.0  0.0 per 100 WBC Final    Differential Type 2020 NOT REPORTED   Final    Seg Neutrophils 2020 66* 36 - 65 % Final    Lymphocytes 2020 24  24 - 43 % Final    Monocytes 2020 8  3 - 12 % Final    Eosinophils % 2020 2  1 - 4 % Final    Basophils 2020 0  0 - 2 % Final    Immature Granulocytes 2020 0  0 % Final    Segs Absolute 2020 3.29  1.50 - 8.10 k/uL Final    Absolute Lymph # 2020 1.17  1.10 - 3.70 k/uL Final    Absolute Mono # 2020 0.39  0.10 - 1.20 k/uL Final    Absolute Eos # 2020 0.08  0.00 - 0.44 k/uL Final    Basophils Absolute 2020 <0.03  0.00 - 0.20 k/uL Final    Absolute Immature Granulocyte 12/09/2020 <0.03  0.00 - 0.30 k/uL Final    WBC Morphology 12/09/2020 NOT REPORTED   Final    RBC Morphology 12/09/2020 NOT REPORTED   Final    Platelet Estimate 50/21/0619 NOT REPORTED   Final    Glucose 12/09/2020 101* 70 - 99 mg/dL Final    BUN 12/09/2020 4* 6 - 20 mg/dL Final    CREATININE 12/09/2020 0.42* 0.50 - 0.90 mg/dL Final    Bun/Cre Ratio 12/09/2020 NOT REPORTED  9 - 20 Final    Calcium 12/09/2020 8.2* 8.6 - 10.4 mg/dL Final    Sodium 12/09/2020 142  135 - 144 mmol/L Final    Potassium 12/09/2020 3.0* 3.7 - 5.3 mmol/L Final    Chloride 12/09/2020 103  98 - 107 mmol/L Final    CO2 12/09/2020 25  20 - 31 mmol/L Final    Anion Gap 12/09/2020 14  9 - 17 mmol/L Final    Alkaline Phosphatase 12/09/2020 88  35 - 104 U/L Final    ALT 12/09/2020 14  5 - 33 U/L Final    AST 12/09/2020 16  <32 U/L Final    Total Bilirubin 12/09/2020 0.23* 0.3 - 1.2 mg/dL Final    Total Protein 12/09/2020 6.0* 6.4 - 8.3 g/dL Final    Alb 12/09/2020 3.2* 3.5 - 5.2 g/dL Final    Albumin/Globulin Ratio 12/09/2020 1.1  1.0 - 2.5 Final    GFR Non- 12/09/2020 >60  >60 mL/min Final    GFR  12/09/2020 >60  >60 mL/min Final    GFR Comment 12/09/2020        Final    Comment: Average GFR for 30-36 years old:   80 mL/min/1.73sq m  Chronic Kidney Disease:   <60 mL/min/1.73sq m  Kidney failure:   <15 mL/min/1.73sq m              eGFR calculated using average adult body mass. Additional eGFR calculator available at:        [email or web address]            GFR Staging 12/09/2020 NOT REPORTED   Final       Wkly BPP/NST at 32 weeks  Growth at 34 weeks  Del at 37 weeks    Declines flu vaccine  Reports had tdap      T-Dap Vaccine (27-36 weeks): completed    The patient was instructed on fetal kick counts and is encouraged to monitor every 8 hours.  She was instructed that the baby should move at a minimum of ten times within one hour after a meal. The patient was instructed to lay down on her left side twenty minutes after eating and count movements for up to one hour with a target value of ten movements. She was instructed to notify the office if she did not make that target after two attempts or if after any attempt there was less than four movements. The patient reports that the fetal movement targets have been made Yes.  Testing:  Wkly NST/BPP  NST reactive today    Assessment:  1. Ricky Murrieta is a 27 y.o. female  2.   3. 32w1d    Patient Active Problem List    Diagnosis Date Noted    Chronic hypertension complicating or reason for care during pregnancy, third trimester 2021    Abdominal pain 10/28/2020        Diagnosis Orders   1. Chronic hypertension complicating or reason for care during pregnancy, third trimester     2. 32 weeks gestation of pregnancy           Plan:  The patient will return to the office for her next visit in 2 weeks. See antepartum flow sheet. Visits will continue every 2 weeks in the third trimester. At 36 weeks will have GBS swab performed and begin weekly visits.  Testing Indicated: Yes  Scheduled with [facility] Office Staff - Pt notified:  Yes

## 2021-01-11 ENCOUNTER — HOSPITAL ENCOUNTER (OUTPATIENT)
Age: 31
Setting detail: SPECIMEN
Discharge: HOME | End: 2021-01-11
Payer: MEDICARE

## 2021-01-11 ENCOUNTER — ROUTINE PRENATAL (OUTPATIENT)
Dept: OBGYN CLINIC | Age: 31
End: 2021-01-11
Payer: MEDICARE

## 2021-01-11 VITALS — SYSTOLIC BLOOD PRESSURE: 120 MMHG | DIASTOLIC BLOOD PRESSURE: 80 MMHG | BODY MASS INDEX: 32.42 KG/M2 | WEIGHT: 213.2 LBS

## 2021-01-11 DIAGNOSIS — H53.9 VISION CHANGES: ICD-10-CM

## 2021-01-11 DIAGNOSIS — R51.9 INTRACTABLE HEADACHE, UNSPECIFIED CHRONICITY PATTERN, UNSPECIFIED HEADACHE TYPE: ICD-10-CM

## 2021-01-11 DIAGNOSIS — O16.3 HYPERTENSION AFFECTING PREGNANCY IN THIRD TRIMESTER: ICD-10-CM

## 2021-01-11 DIAGNOSIS — R51.9 INTRACTABLE HEADACHE, UNSPECIFIED CHRONICITY PATTERN, UNSPECIFIED HEADACHE TYPE: Primary | ICD-10-CM

## 2021-01-11 LAB
ABSOLUTE EOS #: 0.11 K/UL (ref 0–0.44)
ABSOLUTE IMMATURE GRANULOCYTE: 0.05 K/UL (ref 0–0.3)
ABSOLUTE LYMPH #: 1.43 K/UL (ref 1.1–3.7)
ABSOLUTE MONO #: 0.51 K/UL (ref 0.1–1.2)
ALBUMIN SERPL-MCNC: 3.2 G/DL (ref 3.5–5.2)
ALBUMIN/GLOBULIN RATIO: 1.1 (ref 1–2.5)
ALP BLD-CCNC: 114 U/L (ref 35–104)
ALT SERPL-CCNC: 11 U/L (ref 5–33)
ANION GAP SERPL CALCULATED.3IONS-SCNC: 14 MMOL/L (ref 9–17)
AST SERPL-CCNC: 17 U/L
BASOPHILS # BLD: 0 % (ref 0–2)
BASOPHILS ABSOLUTE: <0.03 K/UL (ref 0–0.2)
BILIRUB SERPL-MCNC: 0.38 MG/DL (ref 0.3–1.2)
BUN BLDV-MCNC: 4 MG/DL (ref 6–20)
BUN/CREAT BLD: ABNORMAL (ref 9–20)
CALCIUM SERPL-MCNC: 8.2 MG/DL (ref 8.6–10.4)
CHLORIDE BLD-SCNC: 106 MMOL/L (ref 98–107)
CO2: 23 MMOL/L (ref 20–31)
CREAT SERPL-MCNC: 0.55 MG/DL (ref 0.5–0.9)
CREATININE URINE: 141.7 MG/DL (ref 28–217)
DIFFERENTIAL TYPE: ABNORMAL
EOSINOPHILS RELATIVE PERCENT: 2 % (ref 1–4)
GFR AFRICAN AMERICAN: >60 ML/MIN
GFR NON-AFRICAN AMERICAN: >60 ML/MIN
GFR SERPL CREATININE-BSD FRML MDRD: ABNORMAL ML/MIN/{1.73_M2}
GFR SERPL CREATININE-BSD FRML MDRD: ABNORMAL ML/MIN/{1.73_M2}
GLUCOSE BLD-MCNC: 67 MG/DL (ref 70–99)
HCT VFR BLD CALC: 35.1 % (ref 36.3–47.1)
HEMOGLOBIN: 11.1 G/DL (ref 11.9–15.1)
IMMATURE GRANULOCYTES: 1 %
LYMPHOCYTES # BLD: 24 % (ref 24–43)
MCH RBC QN AUTO: 27.3 PG (ref 25.2–33.5)
MCHC RBC AUTO-ENTMCNC: 31.6 G/DL (ref 28.4–34.8)
MCV RBC AUTO: 86.5 FL (ref 82.6–102.9)
MONOCYTES # BLD: 9 % (ref 3–12)
NRBC AUTOMATED: 0 PER 100 WBC
PDW BLD-RTO: 14.4 % (ref 11.8–14.4)
PLATELET # BLD: 288 K/UL (ref 138–453)
PLATELET ESTIMATE: ABNORMAL
PMV BLD AUTO: 11.6 FL (ref 8.1–13.5)
POTASSIUM SERPL-SCNC: 3.1 MMOL/L (ref 3.7–5.3)
RBC # BLD: 4.06 M/UL (ref 3.95–5.11)
RBC # BLD: ABNORMAL 10*6/UL
SEG NEUTROPHILS: 64 % (ref 36–65)
SEGMENTED NEUTROPHILS ABSOLUTE COUNT: 3.9 K/UL (ref 1.5–8.1)
SODIUM BLD-SCNC: 143 MMOL/L (ref 135–144)
TOTAL PROTEIN, URINE: 27 MG/DL
TOTAL PROTEIN: 6.2 G/DL (ref 6.4–8.3)
URIC ACID: 3.5 MG/DL (ref 2.4–5.7)
URINE TOTAL PROTEIN CREATININE RATIO: 0.19 (ref 0–0.2)
WBC # BLD: 6 K/UL (ref 3.5–11.3)
WBC # BLD: ABNORMAL 10*3/UL

## 2021-01-11 PROCEDURE — 1036F TOBACCO NON-USER: CPT | Performed by: ADVANCED PRACTICE MIDWIFE

## 2021-01-11 PROCEDURE — 99214 OFFICE O/P EST MOD 30 MIN: CPT | Performed by: ADVANCED PRACTICE MIDWIFE

## 2021-01-11 PROCEDURE — G8419 CALC BMI OUT NRM PARAM NOF/U: HCPCS | Performed by: ADVANCED PRACTICE MIDWIFE

## 2021-01-11 PROCEDURE — G8427 DOCREV CUR MEDS BY ELIG CLIN: HCPCS | Performed by: ADVANCED PRACTICE MIDWIFE

## 2021-01-11 PROCEDURE — G8484 FLU IMMUNIZE NO ADMIN: HCPCS | Performed by: ADVANCED PRACTICE MIDWIFE

## 2021-01-11 PROCEDURE — 36415 COLL VENOUS BLD VENIPUNCTURE: CPT | Performed by: ADVANCED PRACTICE MIDWIFE

## 2021-01-11 PROCEDURE — 59025 FETAL NON-STRESS TEST: CPT | Performed by: ADVANCED PRACTICE MIDWIFE

## 2021-01-11 NOTE — PROGRESS NOTES
Saleem Kennedy is here at [address] for an NST due to chronic hypertension and BPP 6/8 today    FHT; Baseline Heart Rate:  140        Accelerations:  Present after acoustic stimulation       Long Term Variability:  moderate       Decelerations:  absent         Contraction frequency: Uterine irritability      Reactive tracing. Continue wkly NST/BPP. Visit today due to HTN and headache with vision changes. Patient was here for NST and BP was obtained. At that time initial BP was found to be elevated 142/86 by Deandre Freeman RN. Patient reported that did take Labetalol this morning. Therefore additional inquires were made and patient did report headache yesterday and vision changes today. Therefore after NST was brought to room for provider evaluation    On evaluation - patient reported headache in temples yesterday, after an hour took Tylenol 1000mg PO and this did help to resolve her s/s. Reports that vision changes have been intermittent over the last few weeks and are present today \"just blurry\". Denies headache today. Denies any episodes recent of epigastric or RUQ abdominal pain but has had heartburn throughout the pregnancy. Denies vaginal bleeding. On evaluation - patient appropriate with eye contact and PERRL noted. Reflexes to bilateral lower extremities 1+ and no pitting edema present. No clonus found bilaterally on exam.      Discussed with patient need to rule out pre-ecl and therefore additional labs to be ordered today. Patient will continue close monitoring and call for additional changes/concerns. She was agreeable.

## 2021-01-13 ENCOUNTER — PROCEDURE VISIT (OUTPATIENT)
Dept: OBGYN CLINIC | Age: 31
End: 2021-01-13
Payer: MEDICARE

## 2021-01-13 VITALS — DIASTOLIC BLOOD PRESSURE: 74 MMHG | WEIGHT: 213.2 LBS | SYSTOLIC BLOOD PRESSURE: 132 MMHG | BODY MASS INDEX: 32.42 KG/M2

## 2021-01-13 DIAGNOSIS — O10.919 CHRONIC HYPERTENSION IN PREGNANCY: Primary | ICD-10-CM

## 2021-01-13 PROCEDURE — 59025 FETAL NON-STRESS TEST: CPT | Performed by: OBSTETRICS & GYNECOLOGY

## 2021-01-14 ENCOUNTER — HOSPITAL ENCOUNTER (INPATIENT)
Age: 31
LOS: 1 days | Discharge: ANOTHER HOSPITAL | DRG: 566 | End: 2021-01-15
Attending: ADVANCED PRACTICE MIDWIFE | Admitting: ADVANCED PRACTICE MIDWIFE
Payer: MEDICARE

## 2021-01-14 ENCOUNTER — TELEPHONE (OUTPATIENT)
Dept: OBGYN CLINIC | Age: 31
End: 2021-01-14

## 2021-01-14 LAB
ABSOLUTE EOS #: 0.09 K/UL (ref 0–0.44)
ABSOLUTE IMMATURE GRANULOCYTE: 0.06 K/UL (ref 0–0.3)
ABSOLUTE LYMPH #: 1.64 K/UL (ref 1.1–3.7)
ABSOLUTE MONO #: 0.83 K/UL (ref 0.1–1.2)
ALBUMIN SERPL-MCNC: 3.3 G/DL (ref 3.5–5.2)
ALBUMIN/GLOBULIN RATIO: 1.1 (ref 1–2.5)
ALP BLD-CCNC: 115 U/L (ref 35–104)
ALT SERPL-CCNC: 9 U/L (ref 5–33)
ANION GAP SERPL CALCULATED.3IONS-SCNC: 9 MMOL/L (ref 9–17)
AST SERPL-CCNC: 13 U/L
BASOPHILS # BLD: 1 % (ref 0–2)
BASOPHILS ABSOLUTE: 0.03 K/UL (ref 0–0.2)
BILIRUB SERPL-MCNC: 0.19 MG/DL (ref 0.3–1.2)
BILIRUBIN URINE: NEGATIVE
BUN BLDV-MCNC: 6 MG/DL (ref 6–20)
BUN/CREAT BLD: 15 (ref 9–20)
CALCIUM SERPL-MCNC: 8.7 MG/DL (ref 8.6–10.4)
CHLORIDE BLD-SCNC: 99 MMOL/L (ref 98–107)
CO2: 23 MMOL/L (ref 20–31)
COLOR: YELLOW
COMMENT UA: NORMAL
CREAT SERPL-MCNC: 0.4 MG/DL (ref 0.5–0.9)
CREATININE URINE: 72.5 MG/DL (ref 28–217)
DIFFERENTIAL TYPE: ABNORMAL
EOSINOPHILS RELATIVE PERCENT: 1 % (ref 1–4)
FIBRINOGEN: 337 MG/DL (ref 179–518)
GFR AFRICAN AMERICAN: >60 ML/MIN
GFR NON-AFRICAN AMERICAN: >60 ML/MIN
GFR SERPL CREATININE-BSD FRML MDRD: ABNORMAL ML/MIN/{1.73_M2}
GFR SERPL CREATININE-BSD FRML MDRD: ABNORMAL ML/MIN/{1.73_M2}
GLUCOSE BLD-MCNC: 81 MG/DL (ref 70–99)
GLUCOSE URINE: NEGATIVE
HCT VFR BLD CALC: 32.1 % (ref 36.3–47.1)
HEMOGLOBIN: 10.4 G/DL (ref 11.9–15.1)
IMMATURE GRANULOCYTES: 1 %
INR BLD: 1
KETONES, URINE: NEGATIVE
LACTATE DEHYDROGENASE: 187 U/L (ref 135–214)
LEUKOCYTE ESTERASE, URINE: NEGATIVE
LYMPHOCYTES # BLD: 26 % (ref 24–43)
MCH RBC QN AUTO: 27.9 PG (ref 25.2–33.5)
MCHC RBC AUTO-ENTMCNC: 32.4 G/DL (ref 28.4–34.8)
MCV RBC AUTO: 86.1 FL (ref 82.6–102.9)
MONOCYTES # BLD: 13 % (ref 3–12)
NITRITE, URINE: NEGATIVE
NRBC AUTOMATED: 0 PER 100 WBC
PARTIAL THROMBOPLASTIN TIME: 27.4 SEC (ref 23.9–33.8)
PDW BLD-RTO: 14 % (ref 11.8–14.4)
PH UA: 7.5 (ref 5–9)
PLATELET # BLD: 235 K/UL (ref 138–453)
PLATELET ESTIMATE: ABNORMAL
PMV BLD AUTO: 11.1 FL (ref 8.1–13.5)
POTASSIUM SERPL-SCNC: 3.4 MMOL/L (ref 3.7–5.3)
PROTEIN UA: NEGATIVE
PROTHROMBIN TIME: 12.8 SEC (ref 11.5–14.2)
RBC # BLD: 3.73 M/UL (ref 3.95–5.11)
RBC # BLD: ABNORMAL 10*6/UL
SEG NEUTROPHILS: 58 % (ref 36–65)
SEGMENTED NEUTROPHILS ABSOLUTE COUNT: 3.72 K/UL (ref 1.5–8.1)
SODIUM BLD-SCNC: 131 MMOL/L (ref 135–144)
SPECIFIC GRAVITY UA: 1.01 (ref 1.01–1.02)
TOTAL PROTEIN, URINE: 13 MG/DL
TOTAL PROTEIN: 6.3 G/DL (ref 6.4–8.3)
TURBIDITY: CLEAR
URIC ACID: 3.3 MG/DL (ref 2.4–5.7)
URINE HGB: NEGATIVE
URINE TOTAL PROTEIN CREATININE RATIO: 0.18 (ref 0–0.2)
UROBILINOGEN, URINE: NORMAL
WBC # BLD: 6.4 K/UL (ref 3.5–11.3)
WBC # BLD: ABNORMAL 10*3/UL

## 2021-01-14 PROCEDURE — 80306 DRUG TEST PRSMV INSTRMNT: CPT

## 2021-01-14 PROCEDURE — 85384 FIBRINOGEN ACTIVITY: CPT

## 2021-01-14 PROCEDURE — 83615 LACTATE (LD) (LDH) ENZYME: CPT

## 2021-01-14 PROCEDURE — 99222 1ST HOSP IP/OBS MODERATE 55: CPT | Performed by: ADVANCED PRACTICE MIDWIFE

## 2021-01-14 PROCEDURE — 85730 THROMBOPLASTIN TIME PARTIAL: CPT

## 2021-01-14 PROCEDURE — 2580000003 HC RX 258: Performed by: ADVANCED PRACTICE MIDWIFE

## 2021-01-14 PROCEDURE — 85025 COMPLETE CBC W/AUTO DIFF WBC: CPT

## 2021-01-14 PROCEDURE — 84156 ASSAY OF PROTEIN URINE: CPT

## 2021-01-14 PROCEDURE — 6370000000 HC RX 637 (ALT 250 FOR IP): Performed by: ADVANCED PRACTICE MIDWIFE

## 2021-01-14 PROCEDURE — 85610 PROTHROMBIN TIME: CPT

## 2021-01-14 PROCEDURE — 36415 COLL VENOUS BLD VENIPUNCTURE: CPT

## 2021-01-14 PROCEDURE — 84550 ASSAY OF BLOOD/URIC ACID: CPT

## 2021-01-14 PROCEDURE — 80053 COMPREHEN METABOLIC PANEL: CPT

## 2021-01-14 PROCEDURE — 81003 URINALYSIS AUTO W/O SCOPE: CPT

## 2021-01-14 PROCEDURE — 82570 ASSAY OF URINE CREATININE: CPT

## 2021-01-14 PROCEDURE — 2500000003 HC RX 250 WO HCPCS: Performed by: ADVANCED PRACTICE MIDWIFE

## 2021-01-14 PROCEDURE — 59025 FETAL NON-STRESS TEST: CPT | Performed by: ADVANCED PRACTICE MIDWIFE

## 2021-01-14 RX ORDER — SODIUM CHLORIDE, SODIUM LACTATE, POTASSIUM CHLORIDE, CALCIUM CHLORIDE 600; 310; 30; 20 MG/100ML; MG/100ML; MG/100ML; MG/100ML
INJECTION, SOLUTION INTRAVENOUS CONTINUOUS
Status: DISCONTINUED | OUTPATIENT
Start: 2021-01-14 | End: 2021-01-15 | Stop reason: HOSPADM

## 2021-01-14 RX ORDER — LABETALOL HYDROCHLORIDE 5 MG/ML
40 INJECTION, SOLUTION INTRAVENOUS ONCE
Status: COMPLETED | OUTPATIENT
Start: 2021-01-15 | End: 2021-01-14

## 2021-01-14 RX ORDER — LABETALOL HYDROCHLORIDE 5 MG/ML
20 INJECTION, SOLUTION INTRAVENOUS ONCE
Status: COMPLETED | OUTPATIENT
Start: 2021-01-14 | End: 2021-01-14

## 2021-01-14 RX ORDER — LABETALOL 100 MG/1
200 TABLET, FILM COATED ORAL ONCE
Status: COMPLETED | OUTPATIENT
Start: 2021-01-14 | End: 2021-01-14

## 2021-01-14 RX ADMIN — LABETALOL HYDROCHLORIDE 20 MG: 5 INJECTION INTRAVENOUS at 23:23

## 2021-01-14 RX ADMIN — LABETALOL HYDROCHLORIDE 200 MG: 100 TABLET, FILM COATED ORAL at 21:40

## 2021-01-14 RX ADMIN — LABETALOL HYDROCHLORIDE 40 MG: 5 INJECTION INTRAVENOUS at 23:56

## 2021-01-14 RX ADMIN — SODIUM CHLORIDE, POTASSIUM CHLORIDE, SODIUM LACTATE AND CALCIUM CHLORIDE: 600; 310; 30; 20 INJECTION, SOLUTION INTRAVENOUS at 23:20

## 2021-01-14 ASSESSMENT — PAIN DESCRIPTION - DESCRIPTORS: DESCRIPTORS: CRAMPING

## 2021-01-14 NOTE — LETTER
1/15/2021  Re: Dominick Russ was present in the [facility] department for the care of his significant other. Please call unit if you have any questions.        Ellaree Krabbe, RN

## 2021-01-15 ENCOUNTER — HOSPITAL ENCOUNTER (INPATIENT)
Age: 31
LOS: 1 days | Discharge: HOME | DRG: 566 | End: 2021-01-16
Attending: OBSTETRICS & GYNECOLOGY | Admitting: OBSTETRICS & GYNECOLOGY
Payer: MEDICARE

## 2021-01-15 VITALS
OXYGEN SATURATION: 97 % | TEMPERATURE: 98.5 F | WEIGHT: 213 LBS | HEIGHT: 68 IN | DIASTOLIC BLOOD PRESSURE: 81 MMHG | BODY MASS INDEX: 32.28 KG/M2 | RESPIRATION RATE: 18 BRPM | SYSTOLIC BLOOD PRESSURE: 137 MMHG | HEART RATE: 94 BPM

## 2021-01-15 PROBLEM — Z98.890 H/O DILATION AND CURETTAGE: Status: ACTIVE | Noted: 2021-01-15

## 2021-01-15 PROBLEM — F14.10 COCAINE ABUSE (HCC): Status: ACTIVE | Noted: 2021-01-15

## 2021-01-15 PROBLEM — O10.919 CHRONIC HYPERTENSION DURING PREGNANCY: Status: ACTIVE | Noted: 2021-01-15

## 2021-01-15 PROBLEM — O47.00 PRETERM CONTRACTIONS: Status: ACTIVE | Noted: 2021-01-15

## 2021-01-15 PROBLEM — A59.9 TRICHOMONAS INFECTION: Status: ACTIVE | Noted: 2021-01-15

## 2021-01-15 PROBLEM — U07.1 LAB TEST POSITIVE FOR DETECTION OF COVID-19 VIRUS: Status: ACTIVE | Noted: 2021-01-15

## 2021-01-15 LAB
ABO/RH: NORMAL
ABSOLUTE EOS #: <0.03 K/UL (ref 0–0.44)
ABSOLUTE IMMATURE GRANULOCYTE: 0.09 K/UL (ref 0–0.3)
ABSOLUTE LYMPH #: 1 K/UL (ref 1.1–3.7)
ABSOLUTE MONO #: 0.17 K/UL (ref 0.1–1.2)
ALBUMIN SERPL-MCNC: 3.2 G/DL (ref 3.5–5.2)
ALBUMIN/GLOBULIN RATIO: 1 (ref 1–2.5)
ALP BLD-CCNC: 126 U/L (ref 35–104)
ALT SERPL-CCNC: 8 U/L (ref 5–33)
AMPHETAMINE SCREEN URINE: NEGATIVE
ANION GAP SERPL CALCULATED.3IONS-SCNC: 11 MMOL/L (ref 9–17)
ANTIBODY SCREEN: NEGATIVE
ARM BAND NUMBER: NORMAL
AST SERPL-CCNC: 13 U/L
BARBITURATE SCREEN URINE: NEGATIVE
BASOPHILS # BLD: 0 % (ref 0–2)
BASOPHILS ABSOLUTE: <0.03 K/UL (ref 0–0.2)
BENZODIAZEPINE SCREEN, URINE: NEGATIVE
BILIRUB SERPL-MCNC: 0.31 MG/DL (ref 0.3–1.2)
BNP INTERPRETATION: NORMAL
BUN BLDV-MCNC: 4 MG/DL (ref 6–20)
BUN/CREAT BLD: ABNORMAL (ref 9–20)
BUPRENORPHINE URINE: NEGATIVE
CALCIUM SERPL-MCNC: 7.8 MG/DL (ref 8.6–10.4)
CANNABINOID SCREEN URINE: NEGATIVE
CHLORIDE BLD-SCNC: 106 MMOL/L (ref 98–107)
CO2: 20 MMOL/L (ref 20–31)
COCAINE METABOLITE, URINE: NEGATIVE
CREAT SERPL-MCNC: 0.38 MG/DL (ref 0.5–0.9)
DIFFERENTIAL TYPE: ABNORMAL
EOSINOPHILS RELATIVE PERCENT: 0 % (ref 1–4)
EXPIRATION DATE: NORMAL
GFR AFRICAN AMERICAN: >60 ML/MIN
GFR NON-AFRICAN AMERICAN: >60 ML/MIN
GFR SERPL CREATININE-BSD FRML MDRD: ABNORMAL ML/MIN/{1.73_M2}
GFR SERPL CREATININE-BSD FRML MDRD: ABNORMAL ML/MIN/{1.73_M2}
GLUCOSE BLD-MCNC: 108 MG/DL (ref 70–99)
HCT VFR BLD CALC: 34.6 % (ref 36.3–47.1)
HEMOGLOBIN: 10.7 G/DL (ref 11.9–15.1)
IMMATURE GRANULOCYTES: 1 %
LYMPHOCYTES # BLD: 10 % (ref 24–43)
MAGNESIUM: 4.6 MG/DL (ref 1.6–2.6)
MAGNESIUM: 4.6 MG/DL (ref 1.6–2.6)
MCH RBC QN AUTO: 27.9 PG (ref 25.2–33.5)
MCHC RBC AUTO-ENTMCNC: 30.9 G/DL (ref 28.4–34.8)
MCV RBC AUTO: 90.1 FL (ref 82.6–102.9)
MDMA URINE: NORMAL
METHADONE SCREEN, URINE: NEGATIVE
METHAMPHETAMINE, URINE: NEGATIVE
MONOCYTES # BLD: 2 % (ref 3–12)
NRBC AUTOMATED: 0 PER 100 WBC
OPIATES, URINE: NEGATIVE
OXYCODONE SCREEN URINE: NEGATIVE
PDW BLD-RTO: 14.4 % (ref 11.8–14.4)
PHENCYCLIDINE, URINE: NEGATIVE
PLATELET # BLD: 271 K/UL (ref 138–453)
PLATELET ESTIMATE: ABNORMAL
PMV BLD AUTO: 11.6 FL (ref 8.1–13.5)
POTASSIUM SERPL-SCNC: 3.3 MMOL/L (ref 3.7–5.3)
PRO-BNP: 177 PG/ML
PROPOXYPHENE, URINE: NEGATIVE
RBC # BLD: 3.84 M/UL (ref 3.95–5.11)
RBC # BLD: ABNORMAL 10*6/UL
SARS-COV-2, RAPID: DETECTED
SARS-COV-2: ABNORMAL
SARS-COV-2: ABNORMAL
SEG NEUTROPHILS: 87 % (ref 36–65)
SEGMENTED NEUTROPHILS ABSOLUTE COUNT: 8.59 K/UL (ref 1.5–8.1)
SODIUM BLD-SCNC: 137 MMOL/L (ref 135–144)
SOURCE: ABNORMAL
TEST INFORMATION: NORMAL
TOTAL PROTEIN: 6.4 G/DL (ref 6.4–8.3)
TRICYCLIC ANTIDEPRESSANTS, UR: NEGATIVE
WBC # BLD: 9.9 K/UL (ref 3.5–11.3)
WBC # BLD: ABNORMAL 10*3/UL

## 2021-01-15 PROCEDURE — 96376 TX/PRO/DX INJ SAME DRUG ADON: CPT

## 2021-01-15 PROCEDURE — 99239 HOSP IP/OBS DSCHRG MGMT >30: CPT | Performed by: ADVANCED PRACTICE MIDWIFE

## 2021-01-15 PROCEDURE — 6360000002 HC RX W HCPCS: Performed by: STUDENT IN AN ORGANIZED HEALTH CARE EDUCATION/TRAINING PROGRAM

## 2021-01-15 PROCEDURE — 2580000003 HC RX 258: Performed by: STUDENT IN AN ORGANIZED HEALTH CARE EDUCATION/TRAINING PROGRAM

## 2021-01-15 PROCEDURE — 36415 COLL VENOUS BLD VENIPUNCTURE: CPT

## 2021-01-15 PROCEDURE — 1220000000 HC SEMI PRIVATE OB R&B

## 2021-01-15 PROCEDURE — 6370000000 HC RX 637 (ALT 250 FOR IP): Performed by: ADVANCED PRACTICE MIDWIFE

## 2021-01-15 PROCEDURE — 6370000000 HC RX 637 (ALT 250 FOR IP): Performed by: STUDENT IN AN ORGANIZED HEALTH CARE EDUCATION/TRAINING PROGRAM

## 2021-01-15 PROCEDURE — 86900 BLOOD TYPING SEROLOGIC ABO: CPT

## 2021-01-15 PROCEDURE — 6360000002 HC RX W HCPCS: Performed by: ADVANCED PRACTICE MIDWIFE

## 2021-01-15 PROCEDURE — 86850 RBC ANTIBODY SCREEN: CPT

## 2021-01-15 PROCEDURE — 96374 THER/PROPH/DIAG INJ IV PUSH: CPT

## 2021-01-15 PROCEDURE — 76818 FETAL BIOPHYS PROFILE W/NST: CPT

## 2021-01-15 PROCEDURE — C9803 HOPD COVID-19 SPEC COLLECT: HCPCS

## 2021-01-15 PROCEDURE — 86901 BLOOD TYPING SEROLOGIC RH(D): CPT

## 2021-01-15 PROCEDURE — 83735 ASSAY OF MAGNESIUM: CPT

## 2021-01-15 PROCEDURE — 2500000003 HC RX 250 WO HCPCS

## 2021-01-15 PROCEDURE — 80053 COMPREHEN METABOLIC PANEL: CPT

## 2021-01-15 PROCEDURE — 83880 ASSAY OF NATRIURETIC PEPTIDE: CPT

## 2021-01-15 PROCEDURE — 85025 COMPLETE CBC W/AUTO DIFF WBC: CPT

## 2021-01-15 PROCEDURE — U0002 COVID-19 LAB TEST NON-CDC: HCPCS

## 2021-01-15 PROCEDURE — 6370000000 HC RX 637 (ALT 250 FOR IP): Performed by: OBSTETRICS & GYNECOLOGY

## 2021-01-15 RX ORDER — SODIUM CHLORIDE 0.9 % (FLUSH) 0.9 %
10 SYRINGE (ML) INJECTION PRN
Status: DISCONTINUED | OUTPATIENT
Start: 2021-01-15 | End: 2021-01-15 | Stop reason: HOSPADM

## 2021-01-15 RX ORDER — SODIUM CHLORIDE, SODIUM LACTATE, POTASSIUM CHLORIDE, CALCIUM CHLORIDE 600; 310; 30; 20 MG/100ML; MG/100ML; MG/100ML; MG/100ML
INJECTION, SOLUTION INTRAVENOUS CONTINUOUS
Status: DISCONTINUED | OUTPATIENT
Start: 2021-01-15 | End: 2021-01-16 | Stop reason: HOSPADM

## 2021-01-15 RX ORDER — SODIUM CHLORIDE 0.9 % (FLUSH) 0.9 %
10 SYRINGE (ML) INJECTION PRN
Status: DISCONTINUED | OUTPATIENT
Start: 2021-01-15 | End: 2021-01-16 | Stop reason: HOSPADM

## 2021-01-15 RX ORDER — BETAMETHASONE SODIUM PHOSPHATE AND BETAMETHASONE ACETATE 3; 3 MG/ML; MG/ML
12 INJECTION, SUSPENSION INTRA-ARTICULAR; INTRALESIONAL; INTRAMUSCULAR; SOFT TISSUE ONCE
Status: COMPLETED | OUTPATIENT
Start: 2021-01-16 | End: 2021-01-16

## 2021-01-15 RX ORDER — ACETAMINOPHEN 500 MG
1000 TABLET ORAL EVERY 6 HOURS PRN
Status: DISCONTINUED | OUTPATIENT
Start: 2021-01-15 | End: 2021-01-16 | Stop reason: HOSPADM

## 2021-01-15 RX ORDER — LABETALOL HYDROCHLORIDE 5 MG/ML
INJECTION, SOLUTION INTRAVENOUS
Status: COMPLETED
Start: 2021-01-15 | End: 2021-01-15

## 2021-01-15 RX ORDER — CALCIUM GLUCONATE 94 MG/ML
1 INJECTION, SOLUTION INTRAVENOUS PRN
Status: DISCONTINUED | OUTPATIENT
Start: 2021-01-15 | End: 2021-01-15 | Stop reason: HOSPADM

## 2021-01-15 RX ORDER — SODIUM CHLORIDE 0.9 % (FLUSH) 0.9 %
10 SYRINGE (ML) INJECTION EVERY 12 HOURS SCHEDULED
Status: DISCONTINUED | OUTPATIENT
Start: 2021-01-15 | End: 2021-01-16 | Stop reason: HOSPADM

## 2021-01-15 RX ORDER — BUTALBITAL, ACETAMINOPHEN AND CAFFEINE 50; 325; 40 MG/1; MG/1; MG/1
1 TABLET ORAL EVERY 4 HOURS PRN
Status: DISCONTINUED | OUTPATIENT
Start: 2021-01-15 | End: 2021-01-16 | Stop reason: HOSPADM

## 2021-01-15 RX ORDER — DOCUSATE SODIUM 100 MG/1
100 CAPSULE, LIQUID FILLED ORAL 2 TIMES DAILY
Status: DISCONTINUED | OUTPATIENT
Start: 2021-01-15 | End: 2021-01-16 | Stop reason: HOSPADM

## 2021-01-15 RX ORDER — NIFEDIPINE 30 MG/1
30 TABLET, EXTENDED RELEASE ORAL DAILY
Status: DISCONTINUED | OUTPATIENT
Start: 2021-01-15 | End: 2021-01-16 | Stop reason: HOSPADM

## 2021-01-15 RX ORDER — BETAMETHASONE SODIUM PHOSPHATE AND BETAMETHASONE ACETATE 3; 3 MG/ML; MG/ML
12 INJECTION, SUSPENSION INTRA-ARTICULAR; INTRALESIONAL; INTRAMUSCULAR; SOFT TISSUE ONCE
Status: COMPLETED | OUTPATIENT
Start: 2021-01-15 | End: 2021-01-15

## 2021-01-15 RX ORDER — VITAMIN A, ASCORBIC ACID, CHOLECALCIFEROL, .ALPHA.-TOCOPHEROL ACETATE, DL-, THIAMINE MONONITRATE, RIBOFLAVIN, NIACINAMIDE, PYRIDOXINE HYDROCHLORIDE, FOLIC ACID, CYANOCOBALAMIN, CALCIUM CARBONATE, IRON, ZINC OXIDE, AND CUPRIC OXIDE 4000; 120; 400; 22; 1.84; 3; 20; 10; 1; 12; 200; 29; 25; 2 [IU]/1; MG/1; [IU]/1; [IU]/1; MG/1; MG/1; MG/1; MG/1; MG/1; UG/1; MG/1; MG/1; MG/1; MG/1
1 TABLET ORAL DAILY
Status: DISCONTINUED | OUTPATIENT
Start: 2021-01-15 | End: 2021-01-16 | Stop reason: HOSPADM

## 2021-01-15 RX ORDER — SODIUM CHLORIDE 0.9 % (FLUSH) 0.9 %
10 SYRINGE (ML) INJECTION EVERY 12 HOURS SCHEDULED
Status: DISCONTINUED | OUTPATIENT
Start: 2021-01-15 | End: 2021-01-15 | Stop reason: HOSPADM

## 2021-01-15 RX ORDER — LABETALOL HYDROCHLORIDE 5 MG/ML
80 INJECTION, SOLUTION INTRAVENOUS ONCE
Status: COMPLETED | OUTPATIENT
Start: 2021-01-15 | End: 2021-01-15

## 2021-01-15 RX ORDER — MAGNESIUM SULFATE IN WATER 40 MG/ML
4 INJECTION, SOLUTION INTRAVENOUS ONCE
Status: COMPLETED | OUTPATIENT
Start: 2021-01-15 | End: 2021-01-15

## 2021-01-15 RX ORDER — SODIUM CHLORIDE, SODIUM LACTATE, POTASSIUM CHLORIDE, CALCIUM CHLORIDE 600; 310; 30; 20 MG/100ML; MG/100ML; MG/100ML; MG/100ML
INJECTION, SOLUTION INTRAVENOUS CONTINUOUS
Status: DISCONTINUED | OUTPATIENT
Start: 2021-01-15 | End: 2021-01-15 | Stop reason: HOSPADM

## 2021-01-15 RX ORDER — NIFEDIPINE 30 MG/1
30 TABLET, FILM COATED, EXTENDED RELEASE ORAL ONCE
Status: COMPLETED | OUTPATIENT
Start: 2021-01-15 | End: 2021-01-15

## 2021-01-15 RX ORDER — CALCIUM GLUCONATE 94 MG/ML
1 INJECTION, SOLUTION INTRAVENOUS PRN
Status: DISCONTINUED | OUTPATIENT
Start: 2021-01-15 | End: 2021-01-16 | Stop reason: HOSPADM

## 2021-01-15 RX ORDER — DIPHENHYDRAMINE HYDROCHLORIDE 50 MG/ML
25 INJECTION INTRAMUSCULAR; INTRAVENOUS NIGHTLY PRN
Status: DISCONTINUED | OUTPATIENT
Start: 2021-01-15 | End: 2021-01-16 | Stop reason: HOSPADM

## 2021-01-15 RX ORDER — LABETALOL 100 MG/1
100 TABLET, FILM COATED ORAL EVERY 12 HOURS SCHEDULED
Status: DISCONTINUED | OUTPATIENT
Start: 2021-01-15 | End: 2021-01-15

## 2021-01-15 RX ORDER — ACETAMINOPHEN 650 MG/1
650 SUPPOSITORY RECTAL EVERY 4 HOURS PRN
Status: DISCONTINUED | OUTPATIENT
Start: 2021-01-15 | End: 2021-01-15 | Stop reason: HOSPADM

## 2021-01-15 RX ADMIN — MAGNESIUM SULFATE IN WATER 4 G: 40 INJECTION, SOLUTION INTRAVENOUS at 00:28

## 2021-01-15 RX ADMIN — Medication 1 TABLET: at 09:11

## 2021-01-15 RX ADMIN — NIFEDIPINE 30 MG: 30 TABLET, FILM COATED, EXTENDED RELEASE ORAL at 09:12

## 2021-01-15 RX ADMIN — DIPHENHYDRAMINE HYDROCHLORIDE 25 MG: 50 INJECTION, SOLUTION INTRAMUSCULAR; INTRAVENOUS at 22:03

## 2021-01-15 RX ADMIN — SODIUM CHLORIDE, POTASSIUM CHLORIDE, SODIUM LACTATE AND CALCIUM CHLORIDE: 600; 310; 30; 20 INJECTION, SOLUTION INTRAVENOUS at 03:45

## 2021-01-15 RX ADMIN — MAGNESIUM SULFATE HEPTAHYDRATE 2 G/HR: 40 INJECTION, SOLUTION INTRAVENOUS at 11:33

## 2021-01-15 RX ADMIN — DOCUSATE SODIUM 100 MG: 100 CAPSULE, LIQUID FILLED ORAL at 09:11

## 2021-01-15 RX ADMIN — NIFEDIPINE 30 MG: 30 TABLET, EXTENDED RELEASE ORAL at 01:26

## 2021-01-15 RX ADMIN — SODIUM CHLORIDE, POTASSIUM CHLORIDE, SODIUM LACTATE AND CALCIUM CHLORIDE: 600; 310; 30; 20 INJECTION, SOLUTION INTRAVENOUS at 11:33

## 2021-01-15 RX ADMIN — SODIUM CHLORIDE, POTASSIUM CHLORIDE, SODIUM LACTATE AND CALCIUM CHLORIDE: 600; 310; 30; 20 INJECTION, SOLUTION INTRAVENOUS at 20:58

## 2021-01-15 RX ADMIN — LABETALOL HYDROCHLORIDE 80 MG: 5 INJECTION, SOLUTION INTRAVENOUS at 00:19

## 2021-01-15 RX ADMIN — BETAMETHASONE ACETATE AND BETAMETHASONE SODIUM PHOSPHATE 12 MG: 3; 3 INJECTION, SUSPENSION INTRA-ARTICULAR; INTRALESIONAL; INTRAMUSCULAR; SOFT TISSUE at 00:24

## 2021-01-15 RX ADMIN — MAGNESIUM SULFATE HEPTAHYDRATE 2 G/HR: 40 INJECTION, SOLUTION INTRAVENOUS at 20:58

## 2021-01-15 RX ADMIN — MAGNESIUM SULFATE IN WATER 2 G/HR: 40 INJECTION, SOLUTION INTRAVENOUS at 00:50

## 2021-01-15 RX ADMIN — DIPHENHYDRAMINE HYDROCHLORIDE 25 MG: 50 INJECTION, SOLUTION INTRAMUSCULAR; INTRAVENOUS at 04:49

## 2021-01-15 RX ADMIN — ACETAMINOPHEN 1000 MG: 500 TABLET ORAL at 04:48

## 2021-01-15 RX ADMIN — BUTALBITAL, ACETAMINOPHEN AND CAFFEINE 1 TABLET: 50; 325; 40 TABLET ORAL at 13:50

## 2021-01-15 RX ADMIN — BUTALBITAL, ACETAMINOPHEN AND CAFFEINE 1 TABLET: 50; 325; 40 TABLET ORAL at 09:42

## 2021-01-15 RX ADMIN — LABETALOL HYDROCHLORIDE 80 MG: 5 INJECTION INTRAVENOUS at 00:19

## 2021-01-15 ASSESSMENT — PAIN SCALES - GENERAL: PAINLEVEL_OUTOF10: 4

## 2021-01-15 ASSESSMENT — PAIN DESCRIPTION - DESCRIPTORS: DESCRIPTORS: TIGHTNESS

## 2021-01-15 NOTE — FLOWSHEET NOTE
Talked with Jamaica Robles CNM informed patient of all blood pressure since talking Claudia Collinscarlottadominic 136 last on phone. Informed of reactive strip,  Patient now states contractions, more tightenings then pain, approximately every 6 to 8 minutes apart.   Patient still has headache, but denies any vision disturbances or epigastric pain, orders received

## 2021-01-15 NOTE — FLOWSHEET NOTE
[facility] notified of receiving covid + result, [facility] also called, along with [facility].

## 2021-01-15 NOTE — H&P
Department of Obstetrics and Gynecology   Obstetrics History and Physical  H&P Admission Inpatient  Note        CHIEF COMPLAINT:    Chief Complaint   Patient presents with    Headache        HISTORY OF PRESENT ILLNESS:      The patient is a 27 y.o. female at 30w4d. OB History        3    Para   1    Term   1            AB   1    Living           SAB   1    TAB        Ectopic        Molar        Multiple        Live Births                Patient presents with a chief complaint as above and is being admitted for patient presents to labor and delivery from home with a headache. Patient states she has chronic hypertension and typically sees Dr. Tarsha Hussein and Chris Amaral in [facility]. Estimated Due Date: Estimated Date of Delivery: 3/3/21    PRENATAL CARE:    Complicated by: chronic hypertension    PAST OB HISTORY:  OB History        3    Para   1    Term   1            AB   1    Living           SAB   1    TAB        Ectopic        Molar        Multiple        Live Births                    Past Medical History:        Diagnosis Date    Cocaine abuse ([address]     Hypertension     Trichomonal vulvovaginitis      Past Surgical History:        Procedure Laterality Date    DILATION AND CURETTAGE OF UTERUS  2019     Allergies:  Patient has no known allergies.     Social History:    Social History     Socioeconomic History    Marital status: Single     Spouse name: Not on file    Number of children: Not on file    Years of education: Not on file    Highest education level: Not on file   Occupational History    Not on file   Social Needs    Financial resource strain: Not on file    Food insecurity     Worry: Not on file     Inability: Not on file    Transportation needs     Medical: Not on file     Non-medical: Not on file   Tobacco Use    Smoking status: Never Smoker    Smokeless tobacco: Never Used   Substance and Sexual Activity    Alcohol use: Not Currently    Drug use: Never  Sexual activity: Yes   Lifestyle    Physical activity     Days per week: Not on file     Minutes per session: Not on file    Stress: Not on file   Relationships    Social connections     Talks on phone: Not on file     Gets together: Not on file     Attends Gnosticism service: Not on file     Active member of club or organization: Not on file     Attends meetings of clubs or organizations: Not on file     Relationship status: Not on file    Intimate partner violence     Fear of current or ex partner: Not on file     Emotionally abused: Not on file     Physically abused: Not on file     Forced sexual activity: Not on file   Other Topics Concern    Not on file   Social History Narrative    Not on file     Family History:       Problem Relation Age of Onset    Diabetes Brother     Diabetes Maternal Uncle     Diabetes Maternal Grandmother     High Blood Pressure Maternal Grandmother      Medications Prior to Admission:  Medications Prior to Admission: Prenatal Vit-Fe Fum-FA-Omega (PNV PRENATAL PLUS MULTIVIT+DHA) 27-1 & 312 MG MISC, TAKE 1 TAB & 1 CAPSULE BY MOUTH ONE TIME A DAY  labetalol (NORMODYNE) 100 MG tablet, Take 1 tablet by mouth 2 times daily  ferrous sulfate (IRON 325) 325 (65 Fe) MG tablet, Take 1 tablet by mouth daily (with breakfast)  Misc.  Devices (BREAST PUMP) MISC, Medela double pump - plans breastfeeding  Prenatal Vit-Fe Fumarate-FA (PRENATAL PO), Take by mouth    REVIEW OF SYSTEMS:    CONSTITUTIONAL:  negative  RESPIRATORY:  negative  CARDIOVASCULAR:  negative  GASTROINTESTINAL:  negative  ALLERGIC/IMMUNOLOGIC:  negative  NEUROLOGICAL:  negative  BEHAVIOR/PSYCH:  negative    PHYSICAL EXAM:  Vitals:    01/14/21 2330 01/14/21 2335 01/14/21 2341 01/14/21 2346   BP: (!) 166/99 (!) 179/93 (!) 188/95 (!) 182/91   Pulse: 73 73 78 76   Resp:       Temp:       TempSrc:       SpO2:       Weight:       Height:         General appearance:  awake, alert, cooperative, no apparent distress, and appears stated age  Neurologic:  Awake, alert, oriented to name, place and time. Lungs:  No increased work of breathing, good air exchange  Abdomen:  Soft, non tender, gravid, consistent with her gestational age,   Fetal heart rate:  Reassuring. Pelvis:  Adequate pelvis  Cervix: closed    Contraction frequency:  None on admission, 2-4 at current time    Membranes:  Intact    Labs:  Blood Type: A POSITIVE    Rubella:    Rubella Antibody, IGG   Date Value Ref Range Status   08/10/2020 15.8 IU/mL Final     Comment:                 REFERENCE RANGE:  <5.0       NON-REACTIVE (non-immune)  5.0 TO 9.9 EQUIVOCAL  >=10.0     REACTIVE     (immune)             T. Pallidium, IGG:    T. pallidum, IgG   Date Value Ref Range Status   08/10/2020 NONREACTIVE NONREACTIVE Final     Comment:           T. pallidum antibodies are not detected. There is no serological evidence of infection with T. pallidum (early primary syphilis   cannot be excluded). Retest in 2-4 weeks if syphilis is clinically suspect.              Sickle Cell Screen: No results found for: SICKLE  Hepatitis B Surface Antigen:   Hepatitis B Surface Ag   Date Value Ref Range Status   08/10/2020 NONREACTIVE NONREACTIVE Final     HIV:  No results found for: Josue Rosa           Results for orders placed or performed during the hospital encounter of 01/14/21   CBC Auto Differential   Result Value Ref Range    WBC 6.4 3.5 - 11.3 k/uL    RBC 3.73 (L) 3.95 - 5.11 m/uL    Hemoglobin 10.4 (L) 11.9 - 15.1 g/dL    Hematocrit 32.1 (L) 36.3 - 47.1 %    MCV 86.1 82.6 - 102.9 fL    MCH 27.9 25.2 - 33.5 pg    MCHC 32.4 28.4 - 34.8 g/dL    RDW 14.0 11.8 - 14.4 %    Platelets 559 866 - 357 k/uL    MPV 11.1 8.1 - 13.5 fL    NRBC Automated 0.0 0.0 per 100 WBC    Differential Type NOT REPORTED     Seg Neutrophils 58 36 - 65 %    Lymphocytes 26 24 - 43 %    Monocytes 13 (H) 3 - 12 %    Eosinophils % 1 1 - 4 %    Basophils 1 0 - 2 %    Immature Granulocytes 1 (H) 0 %    Segs Absolute 3.72 1.50 - 8.10 k/uL    Absolute Lymph # 1.64 1.10 - 3.70 k/uL    Absolute Mono # 0.83 0.10 - 1.20 k/uL    Absolute Eos # 0.09 0.00 - 0.44 k/uL    Basophils Absolute 0.03 0.00 - 0.20 k/uL    Absolute Immature Granulocyte 0.06 0.00 - 0.30 k/uL    WBC Morphology NOT REPORTED     RBC Morphology NOT REPORTED     Platelet Estimate NOT REPORTED    Comprehensive metabolic panel   Result Value Ref Range    Glucose 81 70 - 99 mg/dL    BUN 6 6 - 20 mg/dL    CREATININE 0.40 (L) 0.50 - 0.90 mg/dL    Bun/Cre Ratio 15 9 - 20    Calcium 8.7 8.6 - 10.4 mg/dL    Sodium 131 (L) 135 - 144 mmol/L    Potassium 3.4 (L) 3.7 - 5.3 mmol/L    Chloride 99 98 - 107 mmol/L    CO2 23 20 - 31 mmol/L    Anion Gap 9 9 - 17 mmol/L    Alkaline Phosphatase 115 (H) 35 - 104 U/L    ALT 9 5 - 33 U/L    AST 13 <32 U/L    Total Bilirubin 0.19 (L) 0.3 - 1.2 mg/dL    Total Protein 6.3 (L) 6.4 - 8.3 g/dL    Alb 3.3 (L) 3.5 - 5.2 g/dL    Albumin/Globulin Ratio 1.1 1.0 - 2.5    GFR Non-African American >60 >60 mL/min    GFR African American >60 >60 mL/min    GFR Comment          GFR Staging         Protime-INR   Result Value Ref Range    Protime 12.8 11.5 - 14.2 sec    INR 1.0    APTT   Result Value Ref Range    PTT 27.4 23.9 - 33.8 sec   Fibrinogen   Result Value Ref Range    Fibrinogen 337 179 - 518 mg/dL   Lactate Dehydrogenase   Result Value Ref Range     135 - 214 U/L   Uric acid   Result Value Ref Range    Uric Acid 3.3 2.4 - 5.7 mg/dL   Urinalysis   Result Value Ref Range    Color, UA YELLOW YELLOW    Turbidity UA CLEAR CLEAR    Glucose, Ur NEGATIVE NEGATIVE    Bilirubin Urine NEGATIVE NEGATIVE    Ketones, Urine NEGATIVE NEGATIVE    Specific Gravity, UA 1.015 1.010 - 1.020    Urine Hgb NEGATIVE NEGATIVE    pH, UA 7.5 5.0 - 9.0    Protein, UA NEGATIVE NEGATIVE    Urobilinogen, Urine Normal Normal    Nitrite, Urine NEGATIVE NEGATIVE    Leukocyte Esterase, Urine NEGATIVE NEGATIVE    Urinalysis Comments NOT REPORTED    Protein / Creatinine Ratio, Urine   Result Value Ref Range    Total Protein, Urine 13 mg/dL    Creatinine, Ur 72.5 28.0 - 217.0 mg/dL    Urine Total Protein Creatinine Ratio 0.18 0.00 - 0.20       ASSESSMENT AND PLAN:    Estimated length of stay: transfer to [facility] when stable    Active Problems:  Chronic hypertension    differential super imposed preeclampsia  33 weeks gestation      Labor: transfer to [facility]  Fetus: Reassuring, Cat 1  GBS: unknown      Reviewed plan with Dr. Amos Mercedes  Discussed BP continue to rise with BP medication intervention. Discussed transfer of care to [facility] when stabilized.        Tameka Stone CNM,1/14/2021 11:58 PM

## 2021-01-15 NOTE — H&P
OBSTETRICAL HISTORY [facility]    Date: 1/15/2021       Time: 4:46 AM   Patient Name: Pat Mariscal     Patient : 1990  Room/Bed: 0710/0710-01    Admission Date/Time: 1/15/2021  3:38 AM      CC: Uncontrolled chronic hypertension    HPI: Pat Mariscal is a 27 y.o.  at 33w2d who presents as a transfer patient from [place] [id number]. Patient presented to [facility] complaining of a headache, blood pressures noted be 180s/90s. She has a history of chronic hypertension and is on labetalol 100mg BID. Patient received IV Labetalol 20mg x1, 40mg x1, 80mg x1; PO Labetalol 200mg x1; Nifedipine XL 30 PO x1. Patient was given a 4g magnesium bolus followed by 2g/hr. Decision made to transfer patient to [facility]. On presentation, patient reports that headache had initially improved after BP medications were given at outside hospital, but mild dull headache has now returned. She does report intermittent cramping present on/off for a few weeks that is not present at this time. She denies any pelvic pressure. She denies any fever, chills, N/V, vision changes, chest pain, shortness of breath, RUQ pain, abdominal pain, and increased swelling/tenderness in bilateral lower extremities. Patient denies any vaginal discharge and any urinary complaints. The patient reports fetal movement is present,  denies loss of fluid, denies vaginal bleeding. Patient repots that she was diagnosed with cHTN in this pregnancy. She was first started on Labetalol 100mg once daily. In late December it was increased to Labetalol 100mg bid. Patient does report compliance with BP medication and states she takes it every around 9am and 9pm everyday. COVID-19 swab was collected at [facility] prior to transfer and resulted as positive immediatley on arrival to [facility]. Patient denies any recent sick contacts or known COVID-19 positive contacts.  She denies any symptoms of COVID including cough, F/C, N/V, or SOB. Patient does report becoming out of breath easier over the last couple of months, but she believes it is associated with her gravid uterus and she had it in her previous pregnancy. Pregnancy is complicated by cHTN (meds), hx cocaine use, hx trichomonas, BMI 32    DATING:  LMP: No LMP recorded. Patient is pregnant.   Estimated Date of Delivery: 3/3/21   Based on: early ultrasound, at 7 1/7 weeks GA    PREGNANCY RISK FACTORS:  Patient Active Problem List   Diagnosis    Abdominal pain    Chronic hypertension complicating or reason for care during pregnancy, third trimester    Chronic hypertension during pregnancy    33 weeks gestation of pregnancy    Hx cocaine use    H/O dilation and curettage (2019)    Hx trichomonas        Steroids Given In This Pregnancy:  yes, date: 1/15/21     REVIEW OF SYSTEMS:   Constitutional: negative fever, negative chills  HEENT: negative visual disturbances, positive headaches  Respiratory: negative dyspnea, negative cough  Cardiovascular: negative chest pain,  negative palpitations  Gastrointestinal: positive intermittent abdominal cramping- not present at this time, negative RUQ pain, negative N/V, negative diarrhea, negative constipation  Genitourinary: negative dysuria, negative vaginal discharge, negative vaginal bleeding  Dermatological: negative rash  Hematologic: negative bruising  Immunologic/Lymphatic: negative recent illness, negative recent sick contact  Musculoskeletal: negative back pain, negative myalgias, negative arthralgias  Neurological:  negative dizziness, negative weakness  Behavior/Psych: negative depression, negative anxiety    OBSTETRICAL HISTORY:   OB History    Para Term  AB Living   3 1 1 0 1 0   SAB TAB Ectopic Molar Multiple Live Births   1 0 0 0 0 0      # Outcome Date GA Lbr Eamon/2nd Weight Sex Delivery Anes PTL Lv   3 Current            2 2019           1 Term 12   7 lb 11 oz (3.487 kg) F Vag-Spont and cooperative  HEENT: head atraumatic, normocephalic, moist mucous membranes, trachea midline  Neurologic:  alert, oriented, normal speech, no focal findings or movement disorder noted  Lungs:  No increased work of breathing, good air exchange, clear to auscultation bilaterally, no crackles or wheezing  Heart:  regular rate and rhythm and no murmur    Abdomen:  soft, gravid, non-tender, no rebound, guarding, or rigidity, and no RUQ or epigastric tenderness  Extremities:  no calf tenderness, non edematous, DTR's: +2/4 bilateral lower extremities   Musculoskeletal: Gross strength equal and intact throughout, no gross abnormalities, range of motion normal in hips, knees, shoulders and spine, CVA tenderness: none  Psychiatric: Mood appropriate, normal affect   Rectal Exam: not indicated      PRENATAL LAB RESULTS:   Blood Type/Rh: A pos  Antibody Screen: negative  Hemoglobin, Hematocrit, Platelets: 30.0 / 77.3 / 309  Rubella: immune  T.  Pallidum, IgG: non-reactive   Hepatitis B Surface Antigen: non-reactive   HIV: not available   Sickle Cell Screen: not available  Gonorrhea: negative  Chlamydia: negative  Urine culture: negative, date: 8/10/20    1 hour Glucose Tolerance Test: 102    Group B Strep: not done  Cystic Fibrosis Screen: not available  First Trimester Screen: not available  MSAFP/Multiple Markers: not available  Non-Invasive Prenatal Testing: no aneuploidy detected  Anatomy US: female, echogenic focus in left ventricle, anterior placenta    ASSESSMENT & PLAN:  Citlali Canales is a 27 y.o. female  at 33w2d IUP   - GBS unknown / Rh positive / R immune   - Pen G for GBS prophylaxis if delivery imminent   - cEFM/TOCO: Cat 1 FHT, regular contractions- patient does not appreciate contractions   - SVE @ outside hospital: closed/thick/high   - BPP , cephalic presentation   - S/p Celestone x1, next @ 0024 on    - PNV daily   - DVT prophylaxis: SCDs     Uncontrolled chronic hypertension (meds)   - Patient is a transfer from [place] [address] who initially presented complaining of headache, blood pressures at time of presentation noted to be 180/90s. - Patient reports cHTN was diagnosed early in pregnancy. - Patient's BP has been controlled on Labetalol 100mg bid   - Prior to transfer, patient received IV Labetalol 20mg x1, 40mg x1, 80mg x1; PO Labetalol 200mg x1; Nifedipine 30 PO x1.    - Magnesium sulfate 4g bolus given followed by 2g/hr, started at [address] (1/15) at [address] monitor mag levels q 6h   - Patient normotensive on arrival, continue to monitor BP closely   - Initially HA resolved with treatment with antihypertensives, however, patient now reports dull HA, denies other s/s of preE   - PreE labs wnl x1, P/C 0.18, repeat preE labs this AM   - Patient presentation more consistent with uncontrolled cHTN rather than CHARLES w/ SF as patient does not have proteinuria and HA did initially resolve with treatment of BP. Plan to continue magnesium, treat HA with Tylenol/Benadryl, and change Labetalol 100mg bid to Procardia XL 30mg daily and continue to increase oral medications as needed. COVID-19 Positive   - Patient had positive COVID-19 swab today prior to transfer   - She denies any s/s of COVID-19 except HA and possible dyspnea with significant exertion that has been present for weeks   - Afebrile, SpO2 97%, VSS   - No leukocytosis   - Coags wnl   - Repeat CBC, CMP this AM. Will order BNP for this AM.   - Continue to monitor closely   - Respiratory and droplet precautions in place     Contractions   - Patient does not appreciate contractions   - TOCO shows inverted contractions q 3 min   - SVE closed/thick/high on outside hospital   - S/p Celestone x1, next @ 0024 on    - S/p Mag bolus, continue @ 2g/h   - Will continue IV fluids @75ml/h   - Patient is to notify staff of any worsening cramping, contractions, LOF or VB.        Fetal echogenic focus in left ventricle   - Seen on US 10/21/20   - NIPT wnl    Hx cocaine use   - UDS negative on admission   - Last use in 2019 per patient report    Remote Hx trichomonas   - Per patient report    - No VISH available in EMR, will request records and collect VISH if needed. BMI 32    Patient Active Problem List    Diagnosis Date Noted    Chronic hypertension during pregnancy 01/15/2021    Hx cocaine use 01/15/2021    H/O dilation and curettage (2019) 01/15/2021    Hx trichomonas 01/15/2021    33 weeks gestation of pregnancy     Chronic hypertension complicating or reason for care during pregnancy, third trimester 01/07/2021    Abdominal pain 10/28/2020       Plan discussed with Dr. Maryana Fontaine, who is agreeable. Steroids given this admission: Yes - second dose of celestone will be given    Risks, benefits, alternatives and possible complications have been discussed in detail with the patient. Admission, and post admission procedures and expectations were discussed in detail. All questions were answered.     Attending's Name: Dr. Farrha Yu DO  Ob/Gyn Resident  1/15/2021, 4:46 AM

## 2021-01-15 NOTE — DISCHARGE SUMMARY
Obstetric Discharge Summary  [facility]    Patient Name: Noemi Mack  Patient : 1990  Primary Care Physician: No primary care provider on file. Admit Date: 1/15/2021    Principal Diagnosis: IUP at 33w2d, admitted for transfer from [place] for uncontrolled chronic hypertension  Her pregnancy has been complicated by:   Patient Active Problem List   Diagnosis    Abdominal pain    Chronic hypertension complicating or reason for care during pregnancy, third trimester    Chronic hypertension during pregnancy    33 weeks gestation of pregnancy    Hx cocaine use    H/O dilation and curettage ()    Hx trichomonas    COVID pos 1/15/21    S/p Celestone 1/15 and 21       Infection Present?: [address] Acquired: No    Surgical Operations & Procedures:  NA    Pertinent Findings & Procedures:   Noemi Mack is a [id number] y.o. female  at 33w2d admitted for uncontrolled chronic hypertension; received Magnesium sulfate 4g bolus, 2g/hr x24h. Patient was previously on labetalol 100 BID but that was discontinued and she was started on procardia 30 XL daily. HD #1 (): PreE labs wnl x1, P/C 0.18    HD#2 (1/15): CBC, CMP were WNL. BNP at 177. She received first dose of Celestone. HD#3 (): CBC, CMP reviewed. She received second dose of Celestone and magnesium was discontinued. BPs were well controlled. . She was discharged home with a blood pressure cuff and procardia     Course of patient: uncomplicated    Discharge to: Home    Readmission planned: no     Recommendations on Discharge:     Medications:      Medication List      START taking these medications    Blood Pressure Cuff Misc  1 Units by Does not apply route daily     NIFEdipine 30 MG extended release tablet  Commonly known as: PROCARDIA XL  Take 1 tablet by mouth daily        CONTINUE taking these medications    ferrous sulfate 325 (65 Fe) MG tablet  Commonly known as: IRON 325  Take 1 tablet by mouth daily (with breakfast)     PNV Prenatal Plus Multivit+DHA 27-1 & 312 MG Misc     PRENATAL PO        STOP taking these medications    Breast Pump Misc     labetalol 100 MG tablet  Commonly known as: Estle Andorran           Where to Get Your Medications      You can get these medications from any pharmacy    Bring a paper prescription for each of these medications  · Blood Pressure Cuff Misc  · NIFEdipine 30 MG extended release tablet           Activity: as tolerated, no lifting greater than 15 lbs  Diet: regular diet  Follow up: 1 weeks     Condition on discharge: stable    Discharge date: 01/16/21      Alissa Fink  Ob/Gyn Resident          Attending Physician Statement  I have discussed the care of Maddy Gallegos, including pertinent history and exam findings,  with the resident. I have seen and examined the patient and the key elements of all parts of the encounter have been performed by me. I agree with the assessment, plan and orders as documented by the resident.   [facility] Modifier)    Electronically signed by Laci Daugherty DO on 1/16/2021 at 9:27 AM

## 2021-01-15 NOTE — FLOWSHEET NOTE
Patient now states contractions are starting to get painful, rating a 6 on pain scale, every 2 to 3 minutes with patient complaining abdominal cramping

## 2021-01-15 NOTE — PROGRESS NOTES
0024,                - S/p labetalol 100 BID at home > Procardia 30 XL daily               - S/p IV labetalol 20 x1, 40 x1, 80 x1 (last @0019 1/15)              - S/p Procardia 30 XL x1 (last @ 0126, 1/15)              - S/p labetalol 200 PO x1              - PreE labs WNL x1, P/C 0.18 ()     Left posterior thoracic mass              - Suspected lipoma              - Non-tender to palpation              - No evidence of abscess or infection              - Will monitor closely, can consider imaging              - Pt reports having this mass for several years and it has not grown or gotten smaller and is non-tender and not painful     Asymptomatic COVID 19 +               - Pt denies any CP, SOB              - VSS with SpO2 96% on RA              - Will monitor closely      contractions              - Pt denies any contractions currently and feels good fetal movement, denies any LOF or VB              - No contractions on the monitor              - Will monitor closely     Dereje Baird DO, PGY3  Ob/Gyn Resident  Pager: Antonia Alcazar  1/15/[id number]:38 PM

## 2021-01-15 NOTE — PROGRESS NOTES
In room with patient and significant other all informed consents reviewed and obtained all questions answered.   Blood pressures currently remained stable in transport team will be here in an hour and 15-minute

## 2021-01-15 NOTE — PROGRESS NOTES
Resident Interval Antepartum Magnesium Sulfate Note    Dottie Hurtado is a 27 y.o. female  at 33w2d  The patient is resting comfortably. She denies headache, visual changes and abdominal pain in the right upper quadrant. She denies any shortness of breath or chest pain. She denies change in her extremities, regarding swelling.     Continuous Medications:    lactated ringers 75 mL/hr at 01/15/21 0345    magnesium sulfate 2 g/hr (01/15/21 0400)       Vitals:    Vitals:    01/15/21 0600 01/15/21 0700 01/15/21 0800 01/15/21 0900   BP: (!) 142/85 139/86 (!) 140/79 (!) 140/80   Pulse: 101 105 101 104   Resp:   16 16   Temp:   98 °F (36.7 °C)    TempSrc:   Oral    SpO2: 95% 94% 91% 96%   Weight:       Height:             Fetal heart rate: Baseline Heart Rate:  135, moderate variability, accelerations: present, decelerations: early     [place]: irregular contractions q 10-15minutes    Physical Exam:  Chest: Clear to auscultation bilaterally  Heart: RRR no murmur  Abdomen: soft, nontender, gravid, no s/s chorio or abruption  Back/MSK: Left lateral to the midline spine, 10 x 5cm subcutaneous mass that is fixed, non-mobile, non-tender to palpation, non-fluctuant, overlying skin without erythema/edema/drainage or rash, negative bilateral CVA tenderness   Extremities: DTR decreased Right: 1/4   Left: 1/4  Clonus: absent    Urine Output: 63ml/hr urine    ASSESSMENT/PLAN  Dottie Hurtado is a 27 y.o. female  at 33w2d IUP here for Uncontrolled chronic hypertension    - Continue Magnesium Sulfate Treatment 2g/hr will continue at least until next celestone and re-evaluate at that time   - Mag levels q6hrs per provider first at 1200   - BPs much improved and non-severe range   - Patient denies any s/s PreE   - UOP adequate   - CBC, CMP, T&S, BNP pending this AM    - S/p celestone x1, next @ 0024,     - S/p labetalol 100 BID at home > Procardia 30 XL daily    - S/p IV labetalol 20 x1, 40 x1, 80 x1 (last @001 1/15)   - S/p Procardia 30 XL x1 (last @ 0126, 1/15)   - S/p labetalol 200 PO x1   - PreE labs WNL x1, P/C 0.18 ()    Left posterior thoracic mass   - Suspected lipoma   - Non-tender to palpation   - No evidence of abscess or infection   - Will monitor closely, can consider imaging   - Pt reports having this mass for several years and it has not grown or gotten smaller and is non-tender and not painful    Asymptomatic COVID 19 +    - Pt denies any CP, SOB   - VSS with SpO2 96% on RA   - Will monitor closely     contractions   - Pt denies any contractions currently and feels good fetal movement, denies any LOF or VB   - Contractions spaced apart on TOCO    - Will monitor closely    Mo Berry DO  Ob/Gyn Resident  1/15/2021, 9:00 AM     Date: 1/15/2021  Time: 9:28 AM      Patient Name: Paty Spivey  Patient : 1990  Room/Bed: [address] [place] [address]  Admission Date/Time: 1/15/2021  3:38 AM        Attending Physician Statement  I have personally seen, evaluated and discussed the care of Paty Spivey, including pertinent history and exam findings with the resident. I have reviewed and edited their note in the electronic medical record. The key elements of all parts of the encounter have been performed/reviewed by me. I agree with the assessment, plan and orders as documented by the resident. The level of care submitted represents to the best of my ability the care documented in the medical record today. GC Modifier. This service has been performed in part by a resident under the direction of a teaching physician. Attending's Name:  Kentrell Law MD        Patient remains asymptomatic plan to complete steroid course, stop magnesium after second dose of steroids and monitor overnight after completion of magnesium therapy.  Blood pressures currently at goal. Pre-eclampsia labs pending this am.

## 2021-01-15 NOTE — PLAN OF CARE
Problem: Airway Clearance - Ineffective  Goal: Achieve or maintain patent airway  Outcome: Ongoing     Problem: Gas Exchange - Impaired  Goal: Absence of hypoxia  Outcome: Ongoing  Goal: Promote optimal lung function  Outcome: Ongoing     Problem: Breathing Pattern - Ineffective  Goal: Ability to achieve and maintain a regular respiratory rate  Outcome: Ongoing     Problem:  Body Temperature -  Risk of, Imbalanced  Goal: Ability to maintain a body temperature within defined limits  Outcome: Ongoing  Goal: Will regain or maintain usual level of consciousness  Outcome: Ongoing  Goal: Complications related to the disease process, condition or treatment will be avoided or minimized  Outcome: Ongoing     Problem: Isolation Precautions - Risk of Spread of Infection  Goal: Prevent transmission of infection  Outcome: Ongoing     Problem: Nutrition Deficits  Goal: Optimize nutritional status  Outcome: Ongoing     Problem: Risk for Fluid Volume Deficit  Goal: Maintain normal heart rhythm  Outcome: Ongoing  Goal: Maintain absence of muscle cramping  Outcome: Ongoing  Goal: Maintain normal serum potassium, sodium, calcium, phosphorus, and pH  Outcome: Ongoing     Problem: Loneliness or Risk for Loneliness  Goal: Demonstrate positive use of time alone when socialization is not possible  Outcome: Ongoing     Problem: Fatigue  Goal: Verbalize increase energy and improved vitality  Outcome: Ongoing     Problem: Patient Education: Go to Patient Education Activity  Goal: Patient/Family Education  Outcome: Ongoing     Problem: Pain:  Goal: Pain level will decrease  Description: Pain level will decrease  Outcome: Ongoing  Goal: Control of acute pain  Description: Control of acute pain  Outcome: Ongoing  Goal: Control of chronic pain  Description: Control of chronic pain  Outcome: Ongoing     Problem: Healthcare acquired conditions:  Goal: Absence of healthcare acquired conditions  Description: Absence of healthcare acquired conditions  Outcome: Ongoing

## 2021-01-15 NOTE — FLOWSHEET NOTE
Patient ambulatory to room 201 stating headache today no relief and spots before eyes with increased swelling. Patient states is considered to have chronic hypertension with this pregnancy, no problems in the past with blood pressure. Patient also states increased swelling of legs. Patient instructed on obtaining urine and plan of care per [facility], patient agreeable.

## 2021-01-15 NOTE — FLOWSHEET NOTE
Danish LEIGHM informed no improvement in blood pressures, all blood pressure readings given for thel last 15 minutes.   Patient is no zach and contractions are becoming painful, asked for any further blood pressure meds, states no she is on her way

## 2021-01-15 NOTE — DISCHARGE SUMMARY
DX:  Patient Active Problem List    Diagnosis Date Noted    Chronic hypertension during pregnancy 01/15/2021    Pre-eclampsia superimposed on chronic hypertension, antepartum     33 weeks gestation of pregnancy     Chronic hypertension complicating or reason for care during pregnancy, third trimester 01/07/2021    Abdominal pain 10/28/2020       Past Medical History:   Diagnosis Date    Cocaine abuse ([facility] [address]     Hypertension     Trichomonal vulvovaginitis        Condition: stable    Fetal status: reassuring    Diet : regular as tolerated    Activities: no douching intercourse, or tampons. Do not engage in any activities which may cause harm or injury to the abdomen. Luciecallie العلي   Home Medication Instructions [id number]    Printed on:01/15/21 0207   Medication Information                      ferrous sulfate (IRON 325) 325 (65 Fe) MG tablet  Take 1 tablet by mouth daily (with breakfast)             labetalol (NORMODYNE) 100 MG tablet  Take 1 tablet by mouth 2 times daily             Misc.  Devices (BREAST PUMP) MISC  Medela double pump - plans breastfeeding             Prenatal Vit-Fe Fum-FA-Omega (PNV PRENATAL PLUS MULTIVIT+DHA) 27-1 & 312 MG MISC  TAKE 1 TAB & 1 CAPSULE BY MOUTH ONE TIME A DAY             Prenatal Vit-Fe Fumarate-FA (PRENATAL PO)  Take by mouth                  [facility]

## 2021-01-15 NOTE — FLOWSHEET NOTE
Call made to Ervin at [address] [place] [address] St and delivery, report given on this patient.

## 2021-01-15 NOTE — FLOWSHEET NOTE
Patient presents to L&D today for increased blood pressures and headache    IUP at 33 weeks    NST is reactive. Quality of tracing is satisfactory.

## 2021-01-15 NOTE — FLOWSHEET NOTE
[facility] called in the [facility] department, report given on this patient to be transferrred

## 2021-01-15 NOTE — PROGRESS NOTES
[facility] access was called to start a new case they connected me with Dr. Tono Draper we did review blood pressures as she has been here all medications given and magnesium sulfate being started  Dr. Tono Draper requested Procardia extended release 30 mg to be given orally.   We did set up transportation with [facility] awaiting callback for time

## 2021-01-15 NOTE — PROGRESS NOTES
Pt admitted at 0348, c/o headache, not other complaints, received call pt was covid positive, pt transferred to room 710, Efm placed, admission completed, pt sob while walking but states she has felt that way the past few months, pt is not coughing and denies symptoms of covid

## 2021-01-15 NOTE — FLOWSHEET NOTE
AURORA Stone CNM calls in unit. States she would like the next dose of labetelol algorithm given, 40 mg. She is on her way.

## 2021-01-16 VITALS
RESPIRATION RATE: 16 BRPM | DIASTOLIC BLOOD PRESSURE: 93 MMHG | SYSTOLIC BLOOD PRESSURE: 137 MMHG | TEMPERATURE: 98.3 F | WEIGHT: 213 LBS | HEART RATE: 95 BPM | HEIGHT: 68 IN | BODY MASS INDEX: 32.28 KG/M2 | OXYGEN SATURATION: 97 %

## 2021-01-16 PROBLEM — O09.90 HRP (HIGH RISK PREGNANCY): Status: ACTIVE | Noted: 2021-01-16

## 2021-01-16 LAB
ABSOLUTE EOS #: <0.03 K/UL (ref 0–0.44)
ABSOLUTE IMMATURE GRANULOCYTE: 0.11 K/UL (ref 0–0.3)
ABSOLUTE LYMPH #: 1.02 K/UL (ref 1.1–3.7)
ABSOLUTE MONO #: 0.38 K/UL (ref 0.1–1.2)
ALBUMIN SERPL-MCNC: 3.2 G/DL (ref 3.5–5.2)
ALBUMIN/GLOBULIN RATIO: 1 (ref 1–2.5)
ALP BLD-CCNC: 121 U/L (ref 35–104)
ALT SERPL-CCNC: 7 U/L (ref 5–33)
ANION GAP SERPL CALCULATED.3IONS-SCNC: 10 MMOL/L (ref 9–17)
AST SERPL-CCNC: 12 U/L
BASOPHILS # BLD: 0 % (ref 0–2)
BASOPHILS ABSOLUTE: <0.03 K/UL (ref 0–0.2)
BILIRUB SERPL-MCNC: 0.27 MG/DL (ref 0.3–1.2)
BUN BLDV-MCNC: 4 MG/DL (ref 6–20)
BUN/CREAT BLD: ABNORMAL (ref 9–20)
CALCIUM SERPL-MCNC: 7.6 MG/DL (ref 8.6–10.4)
CHLORIDE BLD-SCNC: 106 MMOL/L (ref 98–107)
CO2: 20 MMOL/L (ref 20–31)
CREAT SERPL-MCNC: 0.37 MG/DL (ref 0.5–0.9)
DIFFERENTIAL TYPE: ABNORMAL
EOSINOPHILS RELATIVE PERCENT: 0 % (ref 1–4)
GFR AFRICAN AMERICAN: >60 ML/MIN
GFR NON-AFRICAN AMERICAN: >60 ML/MIN
GFR SERPL CREATININE-BSD FRML MDRD: ABNORMAL ML/MIN/{1.73_M2}
GFR SERPL CREATININE-BSD FRML MDRD: ABNORMAL ML/MIN/{1.73_M2}
GLUCOSE BLD-MCNC: 107 MG/DL (ref 70–99)
HCT VFR BLD CALC: 33.5 % (ref 36.3–47.1)
HEMOGLOBIN: 10.6 G/DL (ref 11.9–15.1)
IMMATURE GRANULOCYTES: 1 %
LYMPHOCYTES # BLD: 9 % (ref 24–43)
MCH RBC QN AUTO: 28.2 PG (ref 25.2–33.5)
MCHC RBC AUTO-ENTMCNC: 31.6 G/DL (ref 28.4–34.8)
MCV RBC AUTO: 89.1 FL (ref 82.6–102.9)
MONOCYTES # BLD: 3 % (ref 3–12)
NRBC AUTOMATED: 0 PER 100 WBC
PDW BLD-RTO: 14.6 % (ref 11.8–14.4)
PLATELET # BLD: 243 K/UL (ref 138–453)
PLATELET ESTIMATE: ABNORMAL
PMV BLD AUTO: 11.6 FL (ref 8.1–13.5)
POTASSIUM SERPL-SCNC: 3.2 MMOL/L (ref 3.7–5.3)
RBC # BLD: 3.76 M/UL (ref 3.95–5.11)
RBC # BLD: ABNORMAL 10*6/UL
SEG NEUTROPHILS: 87 % (ref 36–65)
SEGMENTED NEUTROPHILS ABSOLUTE COUNT: 9.87 K/UL (ref 1.5–8.1)
SODIUM BLD-SCNC: 136 MMOL/L (ref 135–144)
TOTAL PROTEIN: 6.3 G/DL (ref 6.4–8.3)
WBC # BLD: 11.4 K/UL (ref 3.5–11.3)
WBC # BLD: ABNORMAL 10*3/UL

## 2021-01-16 PROCEDURE — 6370000000 HC RX 637 (ALT 250 FOR IP): Performed by: STUDENT IN AN ORGANIZED HEALTH CARE EDUCATION/TRAINING PROGRAM

## 2021-01-16 PROCEDURE — 36415 COLL VENOUS BLD VENIPUNCTURE: CPT

## 2021-01-16 PROCEDURE — 96372 THER/PROPH/DIAG INJ SC/IM: CPT

## 2021-01-16 PROCEDURE — 99231 SBSQ HOSP IP/OBS SF/LOW 25: CPT | Performed by: OBSTETRICS & GYNECOLOGY

## 2021-01-16 PROCEDURE — 80053 COMPREHEN METABOLIC PANEL: CPT

## 2021-01-16 PROCEDURE — 85025 COMPLETE CBC W/AUTO DIFF WBC: CPT

## 2021-01-16 PROCEDURE — 6360000002 HC RX W HCPCS: Performed by: STUDENT IN AN ORGANIZED HEALTH CARE EDUCATION/TRAINING PROGRAM

## 2021-01-16 RX ORDER — NIFEDIPINE 30 MG/1
30 TABLET, EXTENDED RELEASE ORAL DAILY
Qty: 30 TABLET | Refills: 2 | Status: SHIPPED | OUTPATIENT
Start: 2021-01-16 | End: 2021-02-25

## 2021-01-16 RX ORDER — ADHESIVE BANDAGE 3/4"
1 BANDAGE TOPICAL DAILY
Qty: 1 EACH | Refills: 0 | Status: ON HOLD | OUTPATIENT
Start: 2021-01-16 | End: 2021-02-04 | Stop reason: HOSPADM

## 2021-01-16 RX ADMIN — BETAMETHASONE SODIUM PHOSPHATE AND BETAMETHASONE ACETATE 12 MG: 3; 3 INJECTION, SUSPENSION INTRA-ARTICULAR; INTRALESIONAL; INTRAMUSCULAR at 00:03

## 2021-01-16 RX ADMIN — Medication 1 TABLET: at 09:36

## 2021-01-16 RX ADMIN — DOCUSATE SODIUM 100 MG: 100 CAPSULE, LIQUID FILLED ORAL at 09:37

## 2021-01-16 RX ADMIN — NIFEDIPINE 30 MG: 30 TABLET, FILM COATED, EXTENDED RELEASE ORAL at 09:37

## 2021-01-16 NOTE — PROGRESS NOTES
Resident Interval Magnesium Sulfate Note    Stefani Serna is a 27 y.o. female  at 33w2d  The patient is resting comfortably. She denies headache, visual changes and abdominal pain in the right upper quadrant. She denies any shortness of breath or chest pain. She denies change in her extremities, regarding swelling.     Continuous Medications:    lactated ringers Stopped (21 0003)    magnesium sulfate Stopped (21 0003)       Vitals:    Vitals:    01/15/21 2000 01/15/21 2100 01/15/21 2300 21 0000   BP:  (!) 151/92 132/67 123/62   Pulse:  97 97 97   Resp:  18 18 18   Temp:  98 °F (36.7 °C)     TempSrc:  Oral     SpO2: 99% 96%     Weight:       Height:             Fetal heart rate: Baseline Heart Rate:  125, moderate variability, accelerations: present, decelerations: absent     [place]: No contractions     Physical Exam:  Chest: clear to auscultation bilaterally  Heart: RRR no murmur  Abdomen: soft, nontender, gravid, no s/s chorio or abruption  Extremities: DTR decreased Right:    Left:   Clonus: absent    Urine Output: 250/hr; Clear urine    Labs:  Last Magnesium Level:   Lab Results   Component Value Date    MG 4.6 01/15/2021       BMP:    Recent Labs     01/14/21  2055 01/15/21  0831   * 137   K 3.4* 3.3*   CL 99 106   CO2 23 20   BUN 6 4*   CREATININE 0.40* 0.38*   GLUCOSE 81 108*       ASSESSMENT/PLAN  Stefani Serna is a 27 y.o. female  at 33w2d IUP with poorly controlled cHTN (on meds)    - Continue Magnesium Sulfate Treatment 2g/hr, off @ 0030 on 21   - Mag levels q6hrs per provider   - BPs elevated, but not in severe range at this time    - Patient denies any s/s PreE   - UOP adequate    - Strict Is and Os   - Continue Procardia 30 XL daily    - Last IV antihypertensive at 0019 on 1/15    - Pre E labs wnl x1, P/C 0.18    -    - Repeat CBC, CMP tomorrow AM    - SCDs for DVT prophylaxis    - cEFM/toco    - S/p Celestone x1, next @ 0030 pm    - PNV daily    - Continue to monitor closely     Emiliano Rico DO  Ob/Gyn Resident  1/16/2021, 12:04 AM

## 2021-01-16 NOTE — PROGRESS NOTES
Patient was given prescriptions and discharge instructions, verbalized understanding. Patient denied having any questions at this time.

## 2021-01-16 NOTE — PROGRESS NOTES
OB/GYN PROGRESS NOTE    Raiza Epstein is a 27 y.o. female  at [address] [facility] Day: 2    Subjective:   Patient has been seen and examined. Patient is resting comfortably in bed. She denies any complaints. Patient denies any vaginal discharge and any urinary complaints. The patient reports fetal movement is present, denies contractions, denies loss of fluid, denies vaginal bleeding. Patient denies headache, vision changes, nausea, vomiting, fever, chills, shortness of breath, chest pain, RUQ pain, abdominal pain, diarrhea, change in color/amount/odor of vaginal discharge, dysuria or, hematuria.      Objective:   Vitals:  Vitals:    01/15/21 2300 21 0000 21 0303 21 0400   BP: 132/67 123/62 128/78 131/85   Pulse: 97 97 94 101   Resp: 18 18 18 18   Temp: 98.1 °F (36.7 °C)  98 °F (36.7 °C)    TempSrc: Oral  Oral    SpO2: 94%      Weight:       Height:             FHT: 125, moderate variability, accelerations present, decelerations absent  Contractions: none    Physical Exam:  General appearance:  no apparent distress, alert and cooperative  HEENT: head atraumatic, normocephalic, moist mucous membranes, trachea midline  Neurologic:  alert, oriented, normal speech, no focal findings or movement disorder noted  Lungs:  No increased work of breathing, good air exchange, clear to auscultation bilaterally, no crackles or wheezing  Heart:  regular rate and rhythm and no murmur    Abdomen:  soft, gravid, non-tender, no rebound, guarding, or rigidity, no RUQ or epigastric tenderness, no signs or symptoms of abruption and no signs or symptoms of chorioamnionitis  Extremities:  no calf tenderness, non edematous, EPCs on and working   Musculoskeletal: Gross strength equal and intact throughout, no gross abnormalities, range of motion normal in hips, knees, shoulders and spine, CVA tenderness: none  Psychiatric: Mood appropriate, normal affect       DATA:  Labs:   Recent Results (from the past 12 hour(s)) Magnesium    Collection Time: 01/15/21  7:27 PM   Result Value Ref Range    Magnesium 4.6 (H) 1.6 - 2.6 mg/dL       Assessment/Plan:  Noemi Mack is a [address] y.o. female  at [facility] admitted for poorly controlled cHTN (on meds)    - Pt transferred from [address] [place] [address] immune/ GBS unknown    - Pen G for GBS prophylaxis if delivery imminent    - Rhogam: Not indicated    - Influenza vaccination: declined    - Tdap vaccination: received at 27-28 wks per patient report   - Continue PNV daily, SCDs   - VSS   - Cat 1 FHT, TOCO: no contractions    - BSUS : Cephalic, anterior, BPP 8/8    - S/p Celestone 1/15 and     - S/p Magnesium x24 hours   - Previously on Labetalol 100 mg BID, switched to Procardia 30 XL daily    - S/p Labetalol 20 mg IV x1, 40 x1, 80 x1 (last @ 0019 on 1/15)    - Pre E labs wnl x1, P/C 0.18   -     - Repeat CBC, CMP this AM     COVID positive (asymptomatic) 1/15   - Afebrile, VSS    - Patient asymptomatic    - Continue isolation precautions     Anemia    - Hgb 10.7 on admission    - Clinically asymptomatic    - PO iron supplementation     Fetal Echogenic intracardiac focus    - NIPT wnl     Hx Cocaine use    - UDS negative on admission    - Will need SW consult PP         Patient Active Problem List    Diagnosis Date Noted    Chronic hypertension during pregnancy 01/15/2021    Hx cocaine use 01/15/2021     Overview Note:     Last use  per patient report  UDS negative 1/15/21      H/O dilation and curettage (2019) 01/15/2021    Hx trichomonas 01/15/2021    COVID pos 1/15/21 01/15/2021    S/p Celestone 1/15 and 1/16/21 01/15/2021    33 weeks gestation of pregnancy     Chronic hypertension complicating or reason for care during pregnancy, third trimester 2021    Abdominal pain 10/28/2020       Will update Dr. Augusta Randolph DO  Ob/Gyn Resident  2021, 6:43 AM           Attending Physician Statement  I have discussed the care of Darlene Goodell, including pertinent history and exam findings,  with the resident. I have seen and examined the patient and the key elements of all parts of the encounter have been performed by me. Patient seen and evaluated. Doing well. Denies any headache, vision changes, RUQ pain, leakage of fluid, vaginal bleeding or contractions. FM present. BPs well controlled on procardia and off of magnesium. No severe range BPs during this admission. Category I FHTs without any contractions. PreE labs wnl. Patient okay for discharge to home with close follow up. Reviewed recommendations for quarantine due to asymptomatic COVID19+. Will give rx for BP cuff with Rx procardia upon discharge. Reviewed BP goals and s/s preE. All questions answered and patient vocalized understanding. She will call her primary OB office on Monday for follow up next week via virtual visit. I agree with the assessment, plan and orders as documented by the resident.   [facility] Modifier)    Electronically signed by Eliezer Carpenter DO on 1/16/2021 at 9:18 AM WDL

## 2021-01-16 NOTE — FLOWSHEET NOTE
Writer bedside to reapply EFM. Pt states \"can we wait a little longer? \" Pt states she will call out for writer when she is ready for EFM to be reapplied.  notified.

## 2021-01-18 ENCOUNTER — HOSPITAL ENCOUNTER (OUTPATIENT)
Dept: ULTRASOUND IMAGING | Age: 31
Discharge: HOME | End: 2021-01-20
Payer: MEDICARE

## 2021-01-18 ENCOUNTER — HOSPITAL ENCOUNTER (OUTPATIENT)
Age: 31
Discharge: HOME | End: 2021-01-20
Payer: MEDICARE

## 2021-01-18 DIAGNOSIS — I10 CHRONIC HYPERTENSION: Primary | ICD-10-CM

## 2021-01-18 DIAGNOSIS — O10.919 CHRONIC HYPERTENSION AFFECTING PREGNANCY: ICD-10-CM

## 2021-01-18 DIAGNOSIS — Z3A.33 33 WEEKS GESTATION OF PREGNANCY: ICD-10-CM

## 2021-01-18 PROCEDURE — 76819 FETAL BIOPHYS PROFIL W/O NST: CPT

## 2021-01-21 PROBLEM — O10.913 PRE-EXISTING HYPERTENSION AFFECTING PREGNANCY IN THIRD TRIMESTER: Status: ACTIVE | Noted: 2021-01-21

## 2021-01-21 PROBLEM — Z3A.33 33 WEEKS GESTATION OF PREGNANCY: Status: ACTIVE | Noted: 2021-01-21

## 2021-01-26 ENCOUNTER — ROUTINE PRENATAL (OUTPATIENT)
Dept: OBGYN CLINIC | Age: 31
End: 2021-01-26
Payer: MEDICARE

## 2021-01-26 ENCOUNTER — HOSPITAL ENCOUNTER (OUTPATIENT)
Age: 31
Setting detail: SPECIMEN
Discharge: HOME | End: 2021-01-26
Payer: MEDICARE

## 2021-01-26 VITALS — WEIGHT: 215.4 LBS | SYSTOLIC BLOOD PRESSURE: 130 MMHG | BODY MASS INDEX: 32.75 KG/M2 | DIASTOLIC BLOOD PRESSURE: 90 MMHG

## 2021-01-26 DIAGNOSIS — R39.11 URINARY HESITANCY: ICD-10-CM

## 2021-01-26 DIAGNOSIS — R10.9 FLANK PAIN: ICD-10-CM

## 2021-01-26 DIAGNOSIS — Z3A.34 34 WEEKS GESTATION OF PREGNANCY: ICD-10-CM

## 2021-01-26 DIAGNOSIS — O10.919 CHRONIC HYPERTENSION AFFECTING PREGNANCY: Primary | ICD-10-CM

## 2021-01-26 DIAGNOSIS — O10.919 CHRONIC HYPERTENSION AFFECTING PREGNANCY: ICD-10-CM

## 2021-01-26 LAB
ABSOLUTE EOS #: 0.07 K/UL (ref 0–0.44)
ABSOLUTE IMMATURE GRANULOCYTE: 0.09 K/UL (ref 0–0.3)
ABSOLUTE LYMPH #: 1.62 K/UL (ref 1.1–3.7)
ABSOLUTE MONO #: 0.57 K/UL (ref 0.1–1.2)
ALBUMIN SERPL-MCNC: 3.5 G/DL (ref 3.5–5.2)
ALBUMIN/GLOBULIN RATIO: 1.1 (ref 1–2.5)
ALP BLD-CCNC: 138 U/L (ref 35–104)
ALT SERPL-CCNC: 6 U/L (ref 5–33)
ANION GAP SERPL CALCULATED.3IONS-SCNC: 12 MMOL/L (ref 9–17)
AST SERPL-CCNC: 12 U/L
BASOPHILS # BLD: 0 % (ref 0–2)
BASOPHILS ABSOLUTE: <0.03 K/UL (ref 0–0.2)
BILIRUB SERPL-MCNC: 0.31 MG/DL (ref 0.3–1.2)
BUN BLDV-MCNC: 6 MG/DL (ref 6–20)
BUN/CREAT BLD: ABNORMAL (ref 9–20)
CALCIUM SERPL-MCNC: 8.8 MG/DL (ref 8.6–10.4)
CHLORIDE BLD-SCNC: 108 MMOL/L (ref 98–107)
CO2: 19 MMOL/L (ref 20–31)
CREAT SERPL-MCNC: 0.52 MG/DL (ref 0.5–0.9)
DIFFERENTIAL TYPE: ABNORMAL
EOSINOPHILS RELATIVE PERCENT: 1 % (ref 1–4)
GFR AFRICAN AMERICAN: >60 ML/MIN
GFR NON-AFRICAN AMERICAN: >60 ML/MIN
GFR SERPL CREATININE-BSD FRML MDRD: ABNORMAL ML/MIN/{1.73_M2}
GFR SERPL CREATININE-BSD FRML MDRD: ABNORMAL ML/MIN/{1.73_M2}
GLUCOSE BLD-MCNC: 67 MG/DL (ref 70–99)
HCT VFR BLD CALC: 36.6 % (ref 36.3–47.1)
HEMOGLOBIN: 11.6 G/DL (ref 11.9–15.1)
IMMATURE GRANULOCYTES: 1 %
LYMPHOCYTES # BLD: 22 % (ref 24–43)
MCH RBC QN AUTO: 27.8 PG (ref 25.2–33.5)
MCHC RBC AUTO-ENTMCNC: 31.7 G/DL (ref 28.4–34.8)
MCV RBC AUTO: 87.6 FL (ref 82.6–102.9)
MONOCYTES # BLD: 8 % (ref 3–12)
NRBC AUTOMATED: 0 PER 100 WBC
PDW BLD-RTO: 15 % (ref 11.8–14.4)
PLATELET # BLD: 179 K/UL (ref 138–453)
PLATELET ESTIMATE: ABNORMAL
PMV BLD AUTO: 12.5 FL (ref 8.1–13.5)
POTASSIUM SERPL-SCNC: 3.8 MMOL/L (ref 3.7–5.3)
RBC # BLD: 4.18 M/UL (ref 3.95–5.11)
RBC # BLD: ABNORMAL 10*6/UL
SEG NEUTROPHILS: 68 % (ref 36–65)
SEGMENTED NEUTROPHILS ABSOLUTE COUNT: 5.12 K/UL (ref 1.5–8.1)
SODIUM BLD-SCNC: 139 MMOL/L (ref 135–144)
TOTAL PROTEIN: 6.6 G/DL (ref 6.4–8.3)
URIC ACID: 3.3 MG/DL (ref 2.4–5.7)
WBC # BLD: 7.5 K/UL (ref 3.5–11.3)
WBC # BLD: ABNORMAL 10*3/UL

## 2021-01-26 PROCEDURE — G8427 DOCREV CUR MEDS BY ELIG CLIN: HCPCS | Performed by: ADVANCED PRACTICE MIDWIFE

## 2021-01-26 PROCEDURE — G8419 CALC BMI OUT NRM PARAM NOF/U: HCPCS | Performed by: ADVANCED PRACTICE MIDWIFE

## 2021-01-26 PROCEDURE — G8484 FLU IMMUNIZE NO ADMIN: HCPCS | Performed by: ADVANCED PRACTICE MIDWIFE

## 2021-01-26 PROCEDURE — 1036F TOBACCO NON-USER: CPT | Performed by: ADVANCED PRACTICE MIDWIFE

## 2021-01-26 PROCEDURE — 36415 COLL VENOUS BLD VENIPUNCTURE: CPT | Performed by: ADVANCED PRACTICE MIDWIFE

## 2021-01-26 PROCEDURE — 99213 OFFICE O/P EST LOW 20 MIN: CPT | Performed by: ADVANCED PRACTICE MIDWIFE

## 2021-01-26 PROCEDURE — 1111F DSCHRG MED/CURRENT MED MERGE: CPT | Performed by: ADVANCED PRACTICE MIDWIFE

## 2021-01-26 RX ORDER — NIFEDIPINE 30 MG/1
TABLET, FILM COATED, EXTENDED RELEASE ORAL
COMMUNITY
Start: 2021-01-16 | End: 2021-01-26

## 2021-01-26 RX ORDER — BLOOD PRESSURE TEST KIT-LARGE
KIT MISCELLANEOUS
Status: ON HOLD | COMMUNITY
Start: 2021-01-16 | End: 2021-02-04 | Stop reason: HOSPADM

## 2021-01-26 NOTE — PROGRESS NOTES
Ajay Padron is a 27 y.o. female 34w6d    The patient was seen and evaluated. There was positive fetal movements. No contractions or leakage of fluid. Signs and symptoms of  labor as well as labor were reviewed. The S/S of Pre-Eclampsia were reviewed with the patient in detail. She is to report any of these if they occur. She currently complains of intermittent vision floaters and occ headache - does take tylenol and gives relief. BP at home 140-160/80-90's. Takes procardia daily around 0900 - missed dose today. Has anxiety after taking procardia that resolves with time. Denies abdominal pain. Recommended go home, take Procardia and call with recheck of BP at home. Reports flank pain - intermittent and right > left - ongoing issue. Reports hesitancy with urination - unable to give urine for full analysis/culture today - had negative urine at [facility] and no protein today. Bilateral lower extremities - no edema present, reflexes 1+ no clonus present today. Reviewed IOL information and consent signed. The patient had her 28 week labs completed. Admission on 01/15/2021, Discharged on 2021   Component Date Value Ref Range Status    Magnesium 01/15/2021 4.6* 1.6 - 2.6 mg/dL Final    Magnesium 01/15/2021 4.6* 1.6 - 2.6 mg/dL Final    Pro-BNP 01/15/2021 177  <300 pg/mL Final     Pro-BNP results cannot be compared to BNP results.  BNP Interpretation 01/15/2021 Pro-BNP Reference Range:   Final    Comment:       Rule Out:    <300        Grey Zone:   Age <50     300-450   Age 54-65   300-900   Age >75     300-1800  Usually represents mild to moderate HF but other cardiopulmonary causes cannot be ruled out.         Rule In:   Age <50     >65   Age 54-65   >900   Age >75    >5      WBC 01/15/2021 9.9  3.5 - 11.3 k/uL Final    RBC 01/15/2021 3.84* 3.95 - 5.11 m/uL Final    Hemoglobin 01/15/2021 10.7* 11.9 - 15.1 g/dL Final    Hematocrit 01/15/2021 34.6* 36.3 - 47.1 % Final  MCV 01/15/2021 90.1  82.6 - 102.9 fL Final    MCH 01/15/2021 27.9  25.2 - 33.5 pg Final    MCHC 01/15/2021 30.9  28.4 - 34.8 g/dL Final    RDW 01/15/2021 14.4  11.8 - 14.4 % Final    Platelets 37/87/7921 271  138 - 453 k/uL Final    MPV 01/15/2021 11.6  8.1 - 13.5 fL Final    NRBC Automated 01/15/2021 0.0  0.0 per 100 WBC Final    Differential Type 01/15/2021 NOT REPORTED   Final    WBC Morphology 01/15/2021 NOT REPORTED   Final    RBC Morphology 01/15/2021 NOT REPORTED   Final    Platelet Estimate 46/96/4593 NOT REPORTED   Final    Seg Neutrophils 01/15/2021 87* 36 - 65 % Final    Lymphocytes 01/15/2021 10* 24 - 43 % Final    Monocytes 01/15/2021 2* 3 - 12 % Final    Eosinophils % 01/15/2021 0* 1 - 4 % Final    Basophils 01/15/2021 0  0 - 2 % Final    Immature Granulocytes 01/15/2021 1* 0 % Final    Segs Absolute 01/15/2021 8.59* 1.50 - 8.10 k/uL Final    Absolute Lymph # 01/15/2021 1.00* 1.10 - 3.70 k/uL Final    Absolute Mono # 01/15/2021 0.17  0.10 - 1.20 k/uL Final    Absolute Eos # 01/15/2021 <0.03  0.00 - 0.44 k/uL Final    Basophils Absolute 01/15/2021 <0.03  0.00 - 0.20 k/uL Final    Absolute Immature Granulocyte 01/15/2021 0.09  0.00 - 0.30 k/uL Final    Glucose 01/15/2021 108* 70 - 99 mg/dL Final    BUN 01/15/2021 4* 6 - 20 mg/dL Final    CREATININE 01/15/2021 0.38* 0.50 - 0.90 mg/dL Final    Bun/Cre Ratio 01/15/2021 NOT REPORTED  9 - 20 Final    Calcium 01/15/2021 7.8* 8.6 - 10.4 mg/dL Final    Sodium 01/15/2021 137  135 - 144 mmol/L Final    Potassium 01/15/2021 3.3* 3.7 - 5.3 mmol/L Final    Chloride 01/15/2021 106  98 - 107 mmol/L Final    CO2 01/15/2021 20  20 - 31 mmol/L Final    Anion Gap 01/15/2021 11  9 - 17 mmol/L Final    Alkaline Phosphatase 01/15/2021 126* 35 - 104 U/L Final    ALT 01/15/2021 8  5 - 33 U/L Final    AST 01/15/2021 13  <32 U/L Final    Total Bilirubin 01/15/2021 0.31  0.3 - 1.2 mg/dL Final    Total Protein 01/15/2021 6.4  6.4 - 8.3 g/dL 01/16/2021 1.02* 1.10 - 3.70 k/uL Final    Absolute Mono # 01/16/2021 0.38  0.10 - 1.20 k/uL Final    Absolute Eos # 01/16/2021 <0.03  0.00 - 0.44 k/uL Final    Basophils Absolute 01/16/2021 <0.03  0.00 - 0.20 k/uL Final    Absolute Immature Granulocyte 01/16/2021 0.11  0.00 - 0.30 k/uL Final    Glucose 01/16/2021 107* 70 - 99 mg/dL Final    BUN 01/16/2021 4* 6 - 20 mg/dL Final    CREATININE 01/16/2021 0.37* 0.50 - 0.90 mg/dL Final    Bun/Cre Ratio 01/16/2021 NOT REPORTED  9 - 20 Final    Calcium 01/16/2021 7.6* 8.6 - 10.4 mg/dL Final    Sodium 01/16/2021 136  135 - 144 mmol/L Final    Potassium 01/16/2021 3.2* 3.7 - 5.3 mmol/L Final    Chloride 01/16/2021 106  98 - 107 mmol/L Final    CO2 01/16/2021 20  20 - 31 mmol/L Final    Anion Gap 01/16/2021 10  9 - 17 mmol/L Final    Alkaline Phosphatase 01/16/2021 121* 35 - 104 U/L Final    ALT 01/16/2021 7  5 - 33 U/L Final    AST 01/16/2021 12  <32 U/L Final    Total Bilirubin 01/16/2021 0.27* 0.3 - 1.2 mg/dL Final    Total Protein 01/16/2021 6.3* 6.4 - 8.3 g/dL Final    Albumin 01/16/2021 3.2* 3.5 - 5.2 g/dL Final    Albumin/Globulin Ratio 01/16/2021 1.0  1.0 - 2.5 Final    GFR Non- 01/16/2021 >60  >60 mL/min Final    GFR  01/16/2021 >60  >60 mL/min Final    GFR Comment 01/16/2021        Final    Comment: Average GFR for 30-36 years old:   80 mL/min/1.73sq m  Chronic Kidney Disease:   <60 mL/min/1.73sq m  Kidney failure:   <15 mL/min/1.73sq m              eGFR calculated using average adult body mass.  Additional eGFR calculator available at:        [email or web address]br            GFR Staging 01/16/2021 NOT REPORTED   Final   Admission on 01/14/2021, Discharged on 01/15/2021   Component Date Value Ref Range Status    WBC 01/14/2021 6.4  3.5 - 11.3 k/uL Final    RBC 01/14/2021 3.73* 3.95 - 5.11 m/uL Final    Hemoglobin 01/14/2021 10.4* 11.9 - 15.1 g/dL Final    Hematocrit 01/14/2021 32.1* 36.3 - 47.1 % Final    MCV 01/14/2021 86.1  82.6 - 102.9 fL Final    MCH 01/14/2021 27.9  25.2 - 33.5 pg Final    MCHC 01/14/2021 32.4  28.4 - 34.8 g/dL Final    RDW 01/14/2021 14.0  11.8 - 14.4 % Final    Platelets 72/14/4806 235  138 - 453 k/uL Final    MPV 01/14/2021 11.1  8.1 - 13.5 fL Final    NRBC Automated 01/14/2021 0.0  0.0 per 100 WBC Final    Differential Type 01/14/2021 NOT REPORTED   Final    Seg Neutrophils 01/14/2021 58  36 - 65 % Final    Lymphocytes 01/14/2021 26  24 - 43 % Final    Monocytes 01/14/2021 13* 3 - 12 % Final    Eosinophils % 01/14/2021 1  1 - 4 % Final    Basophils 01/14/2021 1  0 - 2 % Final    Immature Granulocytes 01/14/2021 1* 0 % Final    Segs Absolute 01/14/2021 3.72  1.50 - 8.10 k/uL Final    Absolute Lymph # 01/14/2021 1.64  1.10 - 3.70 k/uL Final    Absolute Mono # 01/14/2021 0.83  0.10 - 1.20 k/uL Final    Absolute Eos # 01/14/2021 0.09  0.00 - 0.44 k/uL Final    Basophils Absolute 01/14/2021 0.03  0.00 - 0.20 k/uL Final    Absolute Immature Granulocyte 01/14/2021 0.06  0.00 - 0.30 k/uL Final    WBC Morphology 01/14/2021 NOT REPORTED   Final    RBC Morphology 01/14/2021 NOT REPORTED   Final    Platelet Estimate 44/29/8643 NOT REPORTED   Final    Glucose 01/14/2021 81  70 - 99 mg/dL Final    BUN 01/14/2021 6  6 - 20 mg/dL Final    CREATININE 01/14/2021 0.40* 0.50 - 0.90 mg/dL Final    Bun/Cre Ratio 01/14/2021 15  9 - 20 Final    Calcium 01/14/2021 8.7  8.6 - 10.4 mg/dL Final    Sodium 01/14/2021 131* 135 - 144 mmol/L Final    Potassium 01/14/2021 3.4* 3.7 - 5.3 mmol/L Final    Chloride 01/14/2021 99  98 - 107 mmol/L Final    CO2 01/14/2021 23  20 - 31 mmol/L Final    Anion Gap 01/14/2021 9  9 - 17 mmol/L Final    Alkaline Phosphatase 01/14/2021 115* 35 - 104 U/L Final    ALT 01/14/2021 9  5 - 33 U/L Final    AST 01/14/2021 13  <32 U/L Final    Total Bilirubin 01/14/2021 0.19* 0.3 - 1.2 mg/dL Final    Total Protein 01/14/2021 6.3* 6.4 - 8.3 g/dL Final    Albumin 01/14/2021 3.3* 3.5 - 5.2 g/dL Final    Albumin/Globulin Ratio 01/14/2021 1.1  1.0 - 2.5 Final    GFR Non- 01/14/2021 >60  >60 mL/min Final    GFR  01/14/2021 >60  >60 mL/min Final    GFR Comment 01/14/2021        Final    Comment: Average GFR for 30-36 years old:   107 mL/min/1.73sq m  Chronic Kidney Disease:   <60 mL/min/1.73sq m  Kidney failure:   <15 mL/min/1.73sq m              eGFR calculated using average adult body mass. Additional eGFR calculator available at:        [employer]            GFR Staging 01/14/2021        Final    Comment: Stage 1: Some kidney damage normal GFR  Stage 2: Mild kidney damage GFR 60-89   Stage 3: Moderate kidney damage GFR 30-59  Stage 4: Severe kidney damage GFR 15-29  Stage 5: Severe kidney damage GFR <15  ESRD - chronic treatment by dialysis or transplant            Protime 01/14/2021 12.8  11.5 - 14.2 sec Final    INR 01/14/2021 1.0   Final    Comment:       Non-therapeutic Range:     INR = 0.9-1.2  Therapeutic Range:    Moderate Anticoagulant Intensity:     INR = 2.0-3.0   High Anticoagulant Intensity:     INR = 2.5-3.5            PTT 01/14/2021 27.4  23.9 - 33.8 sec Final    Comment:       IV Heparin Therapy Range:      62.0-94.0            Fibrinogen 01/14/2021 337  179 - 518 mg/dL Final    LD 01/14/2021 187  135 - 214 U/L Final    Uric Acid 01/14/2021 3.3  2.4 - 5.7 mg/dL Final    Color, UA 01/14/2021 YELLOW  YELLOW Final    Turbidity UA 01/14/2021 CLEAR  CLEAR Final    Glucose, Ur 01/14/2021 NEGATIVE  NEGATIVE Final    Bilirubin Urine 01/14/2021 NEGATIVE  NEGATIVE Final    Ketones, Urine 01/14/2021 NEGATIVE  NEGATIVE Final    Specific Gravity, UA 01/14/2021 1.015  1.010 - 1.020 Final    Urine Hgb 01/14/2021 NEGATIVE  NEGATIVE Final    pH, UA 01/14/2021 7.5  5.0 - 9.0 Final    Protein, UA 01/14/2021 NEGATIVE  NEGATIVE Final    Urobilinogen, Urine 01/14/2021 Normal  Normal Final    Nitrite, Urine 01/14/2021 NEGATIVE  NEGATIVE Final    Leukocyte Esterase, Urine 01/14/2021 NEGATIVE  NEGATIVE Final    Urinalysis Comments 01/14/2021 NOT REPORTED   Final    Total Protein, Urine 01/14/2021 13  mg/dL Final    No normal range established.  Creatinine, Ur 01/14/2021 72.5  28.0 - 217.0 mg/dL Final    Urine Total Protein Creatinine Rat* 01/14/2021 0.18  0.00 - 0.20 Final    Amphetamine Screen, Ur 01/14/2021 NEGATIVE  NEGATIVE Final    Barbiturate Screen, Ur 01/14/2021 NEGATIVE  NEGATIVE Final    Benzodiazepine Screen, Urine 01/14/2021 NEGATIVE  NEGATIVE Final    Cocaine Metabolite, Urine 01/14/2021 NEGATIVE  NEGATIVE Final    Methadone Screen, Urine 01/14/2021 NEGATIVE  NEGATIVE Final    Opiates, Urine 01/14/2021 NEGATIVE  NEGATIVE Final    Phencyclidine, Urine 01/14/2021 NEGATIVE  NEGATIVE Final    Propoxyphene, Urine 01/14/2021 NEGATIVE  NEGATIVE Final    Cannabinoid Scrn, Ur 01/14/2021 NEGATIVE  NEGATIVE Final    Oxycodone Screen, Ur 01/14/2021 NEGATIVE  NEGATIVE Final    Methamphetamine, Urine 01/14/2021 NEGATIVE  NEGATIVE Final    Tricyclic Antidepressants, Urine 01/14/2021 NEGATIVE  NEGATIVE Final    Comment: Drug screen results are to be used for medical purposes only. All positive results are   unconfirmed. Testing for employment or legal uses should be sent to a reference laboratory   for confirmation.  MDMA, Urine 01/14/2021 NOT REPORTED  NEGATIVE Final    Buprenorphine Urine 01/14/2021 NEGATIVE  NEGATIVE Final    Test Information 01/14/2021 NOT REPORTED   Final    SARS-CoV-2 01/15/2021        Final    SARS-CoV-2, Rapid 01/15/2021 DETECTED* Not Detected Final    Comment:       Rapid NAAT: The specimen is POSITIVE for SARS-Cov-2, the novel coronavirus associated with   COVID-19. This test has been authorized by the FDA under an Emergency Use Authorization (EUA) for use   by authorized laboratories.         The ID NOW COVID-19 assay is designed to detect the virus that causes COVID-19 in patients   with signs and symptoms of infection who are suspected of COVID-19. An individual without symptoms of COVID-19 and who is not shedding SARS-CoV-2 virus would   expect to have a negative (not detected) result in this assay. Fact sheet for Healthcare Providers: Merissa  Fact sheet for Patients: Naty.taylor          Methodology: Isothermal Nucleic Acid Amplification        Results reported to the appropriate Health Department      Source 01/15/2021 . NASOPHARYNGEAL SWAB   Final    SARS-CoV-2 01/15/2021        Final   Hospital Outpatient Visit on 01/11/2021   Component Date Value Ref Range Status    Total Protein, Urine 01/11/2021 27  mg/dL Final    No normal range established.     Creatinine, Ur 01/11/2021 141.7  28.0 - 217.0 mg/dL Final    Urine Total Protein Creatinine Rat* 01/11/2021 0.19  0.00 - 0.20 Final    Uric Acid 01/11/2021 3.5  2.4 - 5.7 mg/dL Final    Glucose 01/11/2021 67* 70 - 99 mg/dL Final    BUN 01/11/2021 4* 6 - 20 mg/dL Final    CREATININE 01/11/2021 0.55  0.50 - 0.90 mg/dL Final    Bun/Cre Ratio 01/11/2021 NOT REPORTED  9 - 20 Final    Calcium 01/11/2021 8.2* 8.6 - 10.4 mg/dL Final    Sodium 01/11/2021 143  135 - 144 mmol/L Final    Potassium 01/11/2021 3.1* 3.7 - 5.3 mmol/L Final    Chloride 01/11/2021 106  98 - 107 mmol/L Final    CO2 01/11/2021 23  20 - 31 mmol/L Final    Anion Gap 01/11/2021 14  9 - 17 mmol/L Final    Alkaline Phosphatase 01/11/2021 114* 35 - 104 U/L Final    ALT 01/11/2021 11  5 - 33 U/L Final    AST 01/11/2021 17  <32 U/L Final    Total Bilirubin 01/11/2021 0.38  0.3 - 1.2 mg/dL Final    Total Protein 01/11/2021 6.2* 6.4 - 8.3 g/dL Final    Albumin 01/11/2021 3.2* 3.5 - 5.2 g/dL Final    Albumin/Globulin Ratio 01/11/2021 1.1  1.0 - 2.5 Final    GFR Non- 01/11/2021 >60  >60 mL/min Final    GFR African American 01/11/2021 >60  >60 mL/min Final    GFR Comment 01/11/2021        Final    Comment: Average GFR for 30-36 years old:   107 mL/min/1.73sq m  Chronic Kidney Disease:   <60 mL/min/1.73sq m  Kidney failure:   <15 mL/min/1.73sq m              eGFR calculated using average adult body mass.  Additional eGFR calculator available at:        [email or web address]            GFR Staging 01/11/2021 NOT REPORTED   Final    WBC 01/11/2021 6.0  3.5 - 11.3 k/uL Final    RBC 01/11/2021 4.06  3.95 - 5.11 m/uL Final    Hemoglobin 01/11/2021 11.1* 11.9 - 15.1 g/dL Final    Hematocrit 01/11/2021 35.1* 36.3 - 47.1 % Final    MCV 01/11/2021 86.5  82.6 - 102.9 fL Final    MCH 01/11/2021 27.3  25.2 - 33.5 pg Final    MCHC 01/11/2021 31.6  28.4 - 34.8 g/dL Final    RDW 01/11/2021 14.4  11.8 - 14.4 % Final    Platelets 24/10/4405 288  138 - 453 k/uL Final    MPV 01/11/2021 11.6  8.1 - 13.5 fL Final    NRBC Automated 01/11/2021 0.0  0.0 per 100 WBC Final    Differential Type 01/11/2021 NOT REPORTED   Final    Seg Neutrophils 01/11/2021 64  36 - 65 % Final    Lymphocytes 01/11/2021 24  24 - 43 % Final    Monocytes 01/11/2021 9  3 - 12 % Final    Eosinophils % 01/11/2021 2  1 - 4 % Final    Basophils 01/11/2021 0  0 - 2 % Final    Immature Granulocytes 01/11/2021 1* 0 % Final    Segs Absolute 01/11/2021 3.90  1.50 - 8.10 k/uL Final    Absolute Lymph # 01/11/2021 1.43  1.10 - 3.70 k/uL Final    Absolute Mono # 01/11/2021 0.51  0.10 - 1.20 k/uL Final    Absolute Eos # 01/11/2021 0.11  0.00 - 0.44 k/uL Final    Basophils Absolute 01/11/2021 <0.03  0.00 - 0.20 k/uL Final    Absolute Immature Granulocyte 01/11/2021 0.05  0.00 - 0.30 k/uL Final    WBC Morphology 01/11/2021 NOT REPORTED   Final    RBC Morphology 01/11/2021 NOT REPORTED   Final    Platelet Estimate 06/67/4638 NOT REPORTED   Final       Wkly BPP/NST at 32 weeks  Growth at 34 weeks  Del at 37 weeks (IOL scheduled 2/10/2021 at 5:30am - needs to sign IOL form and discuss next visit)    Declines flu vaccine  Reports had tdap      T-Dap Vaccine (27-36 weeks): completed    The patient was instructed on fetal kick counts and is encouraged to monitor every 8 hours. She was instructed that the baby should move at a minimum of ten times within one hour after a meal. The patient was instructed to lay down on her left side twenty minutes after eating and count movements for up to one hour with a target value of ten movements. She was instructed to notify the office if she did not make that target after two attempts or if after any attempt there was less than four movements. The patient reports that the fetal movement targets have been made Yes.  Testing:  Reviewed growth and BPP from today - [facility] 50%    Assessment:  1. Saleem Kennedy is a 27 y.o. female  2.   3. 34w6d    Patient Active Problem List    Diagnosis Date Noted    Pre-existing hypertension affecting pregnancy in third trimester 2021    33 weeks gestation of pregnancy 2021    HRP (high risk pregnancy) 2021    Chronic hypertension during pregnancy 01/15/2021    Hx cocaine use 01/15/2021     Last use  per patient report  UDS negative 1/15/21      H/O dilation and curettage (2019) 01/15/2021    Hx trichomonas 01/15/2021    COVID pos 1/15/21 01/15/2021    S/p Celestone 1/15 and 1/16/21 01/15/2021    33 weeks gestation of pregnancy     Chronic hypertension complicating or reason for care during pregnancy, third trimester 2021    Abdominal pain 10/28/2020        Diagnosis Orders   1. Chronic hypertension affecting pregnancy  CBC Auto Differential    Comprehensive Metabolic Panel    Uric Acid   2. 34 weeks gestation of pregnancy     3. Urinary hesitancy     4. Flank pain           Plan:  The patient will return to the office for her next visit in 1 weeks. See antepartum flow sheet. Visits will continue every 2 weeks in the third trimester. At 36 weeks will have GBS swab performed and begin weekly visits.  Testing Indicated: Yes  Scheduled with [id number] Staff - Pt notified:  Yes

## 2021-01-28 ENCOUNTER — ROUTINE PRENATAL (OUTPATIENT)
Dept: OBGYN CLINIC | Age: 31
End: 2021-01-28
Payer: MEDICARE

## 2021-01-28 ENCOUNTER — PROCEDURE VISIT (OUTPATIENT)
Dept: OBGYN CLINIC | Age: 31
End: 2021-01-28
Payer: MEDICARE

## 2021-01-28 ENCOUNTER — HOSPITAL ENCOUNTER (OUTPATIENT)
Age: 31
Setting detail: SPECIMEN
Discharge: HOME | End: 2021-01-28
Payer: MEDICARE

## 2021-01-28 VITALS
WEIGHT: 212.74 LBS | DIASTOLIC BLOOD PRESSURE: 68 MMHG | BODY MASS INDEX: 32.35 KG/M2 | HEART RATE: 114 BPM | SYSTOLIC BLOOD PRESSURE: 144 MMHG

## 2021-01-28 VITALS — WEIGHT: 212.8 LBS | BODY MASS INDEX: 32.36 KG/M2 | SYSTOLIC BLOOD PRESSURE: 142 MMHG | DIASTOLIC BLOOD PRESSURE: 96 MMHG

## 2021-01-28 DIAGNOSIS — O10.919 CHRONIC HYPERTENSION AFFECTING PREGNANCY: Primary | ICD-10-CM

## 2021-01-28 DIAGNOSIS — O10.919 CHRONIC HYPERTENSION AFFECTING PREGNANCY: ICD-10-CM

## 2021-01-28 DIAGNOSIS — Z3A.35 35 WEEKS GESTATION OF PREGNANCY: ICD-10-CM

## 2021-01-28 DIAGNOSIS — O10.919 CHRONIC HYPERTENSION IN PREGNANCY: Primary | ICD-10-CM

## 2021-01-28 LAB
ABSOLUTE EOS #: 0.07 K/UL (ref 0–0.44)
ABSOLUTE IMMATURE GRANULOCYTE: 0.07 K/UL (ref 0–0.3)
ABSOLUTE LYMPH #: 1.43 K/UL (ref 1.1–3.7)
ABSOLUTE MONO #: 0.51 K/UL (ref 0.1–1.2)
ALBUMIN SERPL-MCNC: 3.7 G/DL (ref 3.5–5.2)
ALBUMIN/GLOBULIN RATIO: 1.2 (ref 1–2.5)
ALP BLD-CCNC: 141 U/L (ref 35–104)
ALT SERPL-CCNC: 6 U/L (ref 5–33)
ANION GAP SERPL CALCULATED.3IONS-SCNC: 13 MMOL/L (ref 9–17)
AST SERPL-CCNC: 12 U/L
BASOPHILS # BLD: 0 % (ref 0–2)
BASOPHILS ABSOLUTE: 0.03 K/UL (ref 0–0.2)
BILIRUB SERPL-MCNC: 0.34 MG/DL (ref 0.3–1.2)
BUN BLDV-MCNC: 6 MG/DL (ref 6–20)
BUN/CREAT BLD: ABNORMAL (ref 9–20)
CALCIUM SERPL-MCNC: 9.2 MG/DL (ref 8.6–10.4)
CHLORIDE BLD-SCNC: 104 MMOL/L (ref 98–107)
CO2: 21 MMOL/L (ref 20–31)
CREAT SERPL-MCNC: 0.43 MG/DL (ref 0.5–0.9)
CREATININE URINE: 145.5 MG/DL (ref 28–217)
DIFFERENTIAL TYPE: ABNORMAL
EOSINOPHILS RELATIVE PERCENT: 1 % (ref 1–4)
GFR AFRICAN AMERICAN: >60 ML/MIN
GFR NON-AFRICAN AMERICAN: >60 ML/MIN
GFR SERPL CREATININE-BSD FRML MDRD: ABNORMAL ML/MIN/{1.73_M2}
GFR SERPL CREATININE-BSD FRML MDRD: ABNORMAL ML/MIN/{1.73_M2}
GLUCOSE BLD-MCNC: 99 MG/DL (ref 70–99)
HCT VFR BLD CALC: 36.2 % (ref 36.3–47.1)
HEMOGLOBIN: 11.7 G/DL (ref 11.9–15.1)
IMMATURE GRANULOCYTES: 1 %
LYMPHOCYTES # BLD: 21 % (ref 24–43)
MCH RBC QN AUTO: 28 PG (ref 25.2–33.5)
MCHC RBC AUTO-ENTMCNC: 32.3 G/DL (ref 28.4–34.8)
MCV RBC AUTO: 86.6 FL (ref 82.6–102.9)
MONOCYTES # BLD: 8 % (ref 3–12)
NRBC AUTOMATED: 0 PER 100 WBC
PDW BLD-RTO: 15 % (ref 11.8–14.4)
PLATELET # BLD: 185 K/UL (ref 138–453)
PLATELET ESTIMATE: ABNORMAL
PMV BLD AUTO: 12.3 FL (ref 8.1–13.5)
POTASSIUM SERPL-SCNC: 3.4 MMOL/L (ref 3.7–5.3)
RBC # BLD: 4.18 M/UL (ref 3.95–5.11)
RBC # BLD: ABNORMAL 10*6/UL
SEG NEUTROPHILS: 69 % (ref 36–65)
SEGMENTED NEUTROPHILS ABSOLUTE COUNT: 4.71 K/UL (ref 1.5–8.1)
SODIUM BLD-SCNC: 138 MMOL/L (ref 135–144)
TOTAL PROTEIN, URINE: 27 MG/DL
TOTAL PROTEIN: 6.7 G/DL (ref 6.4–8.3)
URIC ACID: 3.6 MG/DL (ref 2.4–5.7)
URINE TOTAL PROTEIN CREATININE RATIO: 0.19 (ref 0–0.2)
WBC # BLD: 6.8 K/UL (ref 3.5–11.3)
WBC # BLD: ABNORMAL 10*3/UL

## 2021-01-28 PROCEDURE — 1111F DSCHRG MED/CURRENT MED MERGE: CPT | Performed by: ADVANCED PRACTICE MIDWIFE

## 2021-01-28 PROCEDURE — G8484 FLU IMMUNIZE NO ADMIN: HCPCS | Performed by: ADVANCED PRACTICE MIDWIFE

## 2021-01-28 PROCEDURE — 99213 OFFICE O/P EST LOW 20 MIN: CPT | Performed by: ADVANCED PRACTICE MIDWIFE

## 2021-01-28 PROCEDURE — 59025 FETAL NON-STRESS TEST: CPT | Performed by: ADVANCED PRACTICE MIDWIFE

## 2021-01-28 PROCEDURE — 1036F TOBACCO NON-USER: CPT | Performed by: ADVANCED PRACTICE MIDWIFE

## 2021-01-28 PROCEDURE — 36415 COLL VENOUS BLD VENIPUNCTURE: CPT | Performed by: ADVANCED PRACTICE MIDWIFE

## 2021-01-28 PROCEDURE — G8427 DOCREV CUR MEDS BY ELIG CLIN: HCPCS | Performed by: ADVANCED PRACTICE MIDWIFE

## 2021-01-28 PROCEDURE — G8419 CALC BMI OUT NRM PARAM NOF/U: HCPCS | Performed by: ADVANCED PRACTICE MIDWIFE

## 2021-01-28 NOTE — PROGRESS NOTES
OB visit today strictly to obtain GBS since IOL now 36 weeks per Dr Mona Mena due to difficulty controlling BP's. Patient to increase Procardia XL to 30mg AM and PM instead of once daily. Patient has racing heart for periods of time after taking dose - example today - auscultated and 114. GBS obtained with consent. New IOL form signed. Cervix midposition 1.5cm/soft.   Repeat [address] [employer] [facility] labs today

## 2021-01-28 NOTE — PROGRESS NOTES
Maddy Gallegos is here at 35w1d for an NST due to chronic hypertension    FHT; Baseline Heart Rate:  160        Accelerations:  present       Long Term Variability:  moderate       Decelerations:  absent         Contraction frequency: No regular uterine activity      reactive    Plan:  Wkly BPP/NSt and delivery at 36 weeks

## 2021-01-29 LAB
DIRECT EXAM: ABNORMAL
Lab: ABNORMAL
SPECIMEN DESCRIPTION: ABNORMAL

## 2021-02-02 ENCOUNTER — ROUTINE PRENATAL (OUTPATIENT)
Dept: OBGYN CLINIC | Age: 31
End: 2021-02-02
Payer: MEDICARE

## 2021-02-02 VITALS — WEIGHT: 213.8 LBS | SYSTOLIC BLOOD PRESSURE: 132 MMHG | DIASTOLIC BLOOD PRESSURE: 68 MMHG | BODY MASS INDEX: 32.51 KG/M2

## 2021-02-02 DIAGNOSIS — O10.913 CHRONIC HYPERTENSION COMPLICATING OR REASON FOR CARE DURING PREGNANCY, THIRD TRIMESTER: Primary | ICD-10-CM

## 2021-02-02 PROCEDURE — 1111F DSCHRG MED/CURRENT MED MERGE: CPT | Performed by: ADVANCED PRACTICE MIDWIFE

## 2021-02-02 PROCEDURE — 1036F TOBACCO NON-USER: CPT | Performed by: ADVANCED PRACTICE MIDWIFE

## 2021-02-02 PROCEDURE — 99213 OFFICE O/P EST LOW 20 MIN: CPT | Performed by: ADVANCED PRACTICE MIDWIFE

## 2021-02-02 PROCEDURE — G8419 CALC BMI OUT NRM PARAM NOF/U: HCPCS | Performed by: ADVANCED PRACTICE MIDWIFE

## 2021-02-02 PROCEDURE — G8427 DOCREV CUR MEDS BY ELIG CLIN: HCPCS | Performed by: ADVANCED PRACTICE MIDWIFE

## 2021-02-02 PROCEDURE — G8484 FLU IMMUNIZE NO ADMIN: HCPCS | Performed by: ADVANCED PRACTICE MIDWIFE

## 2021-02-02 RX ORDER — LIDOCAINE HYDROCHLORIDE 10 MG/ML
30 INJECTION, SOLUTION EPIDURAL; INFILTRATION; INTRACAUDAL; PERINEURAL PRN
Status: CANCELLED | OUTPATIENT
Start: 2021-02-02

## 2021-02-02 RX ORDER — SODIUM CHLORIDE 0.9 % (FLUSH) 0.9 %
10 SYRINGE (ML) INJECTION PRN
Status: CANCELLED | OUTPATIENT
Start: 2021-02-02

## 2021-02-02 RX ORDER — METHYLERGONOVINE MALEATE 0.2 MG/ML
200 INJECTION INTRAVENOUS PRN
Status: CANCELLED | OUTPATIENT
Start: 2021-02-02

## 2021-02-02 RX ORDER — SEVOFLURANE 250 ML/250ML
1 LIQUID RESPIRATORY (INHALATION) CONTINUOUS PRN
Status: CANCELLED | OUTPATIENT
Start: 2021-02-02

## 2021-02-02 RX ORDER — BUTORPHANOL TARTRATE 1 MG/ML
1 INJECTION, SOLUTION INTRAMUSCULAR; INTRAVENOUS
Status: CANCELLED | OUTPATIENT
Start: 2021-02-02

## 2021-02-02 RX ORDER — MISOPROSTOL 100 UG/1
900 TABLET ORAL PRN
Status: CANCELLED | OUTPATIENT
Start: 2021-02-02

## 2021-02-02 RX ORDER — ACETAMINOPHEN 325 MG/1
650 TABLET ORAL EVERY 4 HOURS PRN
Status: CANCELLED | OUTPATIENT
Start: 2021-02-02

## 2021-02-02 RX ORDER — SODIUM CHLORIDE, SODIUM LACTATE, POTASSIUM CHLORIDE, CALCIUM CHLORIDE 600; 310; 30; 20 MG/100ML; MG/100ML; MG/100ML; MG/100ML
INJECTION, SOLUTION INTRAVENOUS CONTINUOUS
Status: CANCELLED | OUTPATIENT
Start: 2021-02-02

## 2021-02-02 RX ORDER — CARBOPROST TROMETHAMINE 250 UG/ML
250 INJECTION, SOLUTION INTRAMUSCULAR PRN
Status: CANCELLED | OUTPATIENT
Start: 2021-02-02

## 2021-02-02 RX ORDER — ONDANSETRON 2 MG/ML
4 INJECTION INTRAMUSCULAR; INTRAVENOUS EVERY 6 HOURS PRN
Status: CANCELLED | OUTPATIENT
Start: 2021-02-02

## 2021-02-02 RX ORDER — SODIUM CHLORIDE 0.9 % (FLUSH) 0.9 %
10 SYRINGE (ML) INJECTION EVERY 12 HOURS SCHEDULED
Status: CANCELLED | OUTPATIENT
Start: 2021-02-02

## 2021-02-02 NOTE — PROGRESS NOTES
Potassium 01/28/2021 3.4* 3.7 - 5.3 mmol/L Final    Chloride 01/28/2021 104  98 - 107 mmol/L Final    CO2 01/28/2021 21  20 - 31 mmol/L Final    Anion Gap 01/28/2021 13  9 - 17 mmol/L Final    Alkaline Phosphatase 01/28/2021 141* 35 - 104 U/L Final    ALT 01/28/2021 6  5 - 33 U/L Final    AST 01/28/2021 12  <32 U/L Final    Total Bilirubin 01/28/2021 0.34  0.3 - 1.2 mg/dL Final    Total Protein 01/28/2021 6.7  6.4 - 8.3 g/dL Final    Albumin 01/28/2021 3.7  3.5 - 5.2 g/dL Final    Albumin/Globulin Ratio 01/28/2021 1.2  1.0 - 2.5 Final    GFR Non- 01/28/2021 >60  >60 mL/min Final    GFR  01/28/2021 >60  >60 mL/min Final    GFR Comment 01/28/2021        Final    Comment: Average GFR for 30-36 years old:   80 mL/min/1.73sq m  Chronic Kidney Disease:   <60 mL/min/1.73sq m  Kidney failure:   <15 mL/min/1.73sq m              eGFR calculated using average adult body mass.  Additional eGFR calculator available at:        [email or web address]            GFR Staging 01/28/2021 NOT REPORTED   Final    WBC 01/28/2021 6.8  3.5 - 11.3 k/uL Final    RBC 01/28/2021 4.18  3.95 - 5.11 m/uL Final    Hemoglobin 01/28/2021 11.7* 11.9 - 15.1 g/dL Final    Hematocrit 01/28/2021 36.2* 36.3 - 47.1 % Final    MCV 01/28/2021 86.6  82.6 - 102.9 fL Final    MCH 01/28/2021 28.0  25.2 - 33.5 pg Final    MCHC 01/28/2021 32.3  28.4 - 34.8 g/dL Final    RDW 01/28/2021 15.0* 11.8 - 14.4 % Final    Platelets 88/51/1836 185  138 - 453 k/uL Final    MPV 01/28/2021 12.3  8.1 - 13.5 fL Final    NRBC Automated 01/28/2021 0.0  0.0 per 100 WBC Final    Differential Type 01/28/2021 NOT REPORTED   Final    Seg Neutrophils 01/28/2021 69* 36 - 65 % Final    Lymphocytes 01/28/2021 21* 24 - 43 % Final    Monocytes 01/28/2021 8  3 - 12 % Final    Eosinophils % 01/28/2021 1  1 - 4 % Final    Basophils 01/28/2021 0  0 - 2 % Final    Immature Granulocytes 01/28/2021 1* 0 % Final Final    Total Bilirubin 01/26/2021 0.31  0.3 - 1.2 mg/dL Final    Total Protein 01/26/2021 6.6  6.4 - 8.3 g/dL Final    Albumin 01/26/2021 3.5  3.5 - 5.2 g/dL Final    Albumin/Globulin Ratio 01/26/2021 1.1  1.0 - 2.5 Final    GFR Non- 01/26/2021 >60  >60 mL/min Final    GFR  01/26/2021 >60  >60 mL/min Final    GFR Comment 01/26/2021        Final    Comment: Average GFR for 30-36 years old:   107 mL/min/1.73sq m  Chronic Kidney Disease:   <60 mL/min/1.73sq m  Kidney failure:   <15 mL/min/1.73sq m              eGFR calculated using average adult body mass.  Additional eGFR calculator available at:        [employer]            GFR Staging 01/26/2021 NOT REPORTED   Final    WBC 01/26/2021 7.5  3.5 - 11.3 k/uL Final    RBC 01/26/2021 4.18  3.95 - 5.11 m/uL Final    Hemoglobin 01/26/2021 11.6* 11.9 - 15.1 g/dL Final    Hematocrit 01/26/2021 36.6  36.3 - 47.1 % Final    MCV 01/26/2021 87.6  82.6 - 102.9 fL Final    MCH 01/26/2021 27.8  25.2 - 33.5 pg Final    MCHC 01/26/2021 31.7  28.4 - 34.8 g/dL Final    RDW 01/26/2021 15.0* 11.8 - 14.4 % Final    Platelets 09/10/3560 179  138 - 453 k/uL Final    MPV 01/26/2021 12.5  8.1 - 13.5 fL Final    NRBC Automated 01/26/2021 0.0  0.0 per 100 WBC Final    Differential Type 01/26/2021 NOT REPORTED   Final    Seg Neutrophils 01/26/2021 68* 36 - 65 % Final    Lymphocytes 01/26/2021 22* 24 - 43 % Final    Monocytes 01/26/2021 8  3 - 12 % Final    Eosinophils % 01/26/2021 1  1 - 4 % Final    Basophils 01/26/2021 0  0 - 2 % Final    Immature Granulocytes 01/26/2021 1* 0 % Final    Segs Absolute 01/26/2021 5.12  1.50 - 8.10 k/uL Final    Absolute Lymph # 01/26/2021 1.62  1.10 - 3.70 k/uL Final    Absolute Mono # 01/26/2021 0.57  0.10 - 1.20 k/uL Final    Absolute Eos # 01/26/2021 0.07  0.00 - 0.44 k/uL Final    Basophils Absolute 01/26/2021 <0.03  0.00 - 0.20 k/uL Final    Absolute Immature Granulocyte 01/26/2021 0.09  0.00 - 0.30 k/uL Final    WBC Morphology 01/26/2021 NOT REPORTED   Final    RBC Morphology 01/26/2021 ANISOCYTOSIS PRESENT   Final    Platelet Estimate 73/83/1173 NOT REPORTED   Final   Admission on 01/15/2021, Discharged on 01/16/2021   Component Date Value Ref Range Status    Magnesium 01/15/2021 4.6* 1.6 - 2.6 mg/dL Final    Magnesium 01/15/2021 4.6* 1.6 - 2.6 mg/dL Final    Pro-BNP 01/15/2021 177  <300 pg/mL Final     Pro-BNP results cannot be compared to BNP results.  BNP Interpretation 01/15/2021 Pro-BNP Reference Range:   Final    Comment:       Rule Out:    <300        [place] Zone:   Age <50     300-450   Age 54-65   300-900   Age >75     300-1800  Usually represents mild to moderate HF but other cardiopulmonary causes cannot be ruled out.         Rule In:   Age <50     >65   Age 54-65   >900   Age >75    >5      WBC 01/15/2021 9.9  3.5 - 11.3 k/uL Final    RBC 01/15/2021 3.84* 3.95 - 5.11 m/uL Final    Hemoglobin 01/15/2021 10.7* 11.9 - 15.1 g/dL Final    Hematocrit 01/15/2021 34.6* 36.3 - 47.1 % Final    MCV 01/15/2021 90.1  82.6 - 102.9 fL Final    MCH 01/15/2021 27.9  25.2 - 33.5 pg Final    MCHC 01/15/2021 30.9  28.4 - 34.8 g/dL Final    RDW 01/15/2021 14.4  11.8 - 14.4 % Final    Platelets 22/50/4520 271  138 - 453 k/uL Final    MPV 01/15/2021 11.6  8.1 - 13.5 fL Final    NRBC Automated 01/15/2021 0.0  0.0 per 100 WBC Final    Differential Type 01/15/2021 NOT REPORTED   Final    WBC Morphology 01/15/2021 NOT REPORTED   Final    RBC Morphology 01/15/2021 NOT REPORTED   Final    Platelet Estimate 04/34/6208 NOT REPORTED   Final    Seg Neutrophils 01/15/2021 87* 36 - 65 % Final    Lymphocytes 01/15/2021 10* 24 - 43 % Final    Monocytes 01/15/2021 2* 3 - 12 % Final    Eosinophils % 01/15/2021 0* 1 - 4 % Final    Basophils 01/15/2021 0  0 - 2 % Final    Immature Granulocytes 01/15/2021 1* 0 % Final    Segs Absolute 01/15/2021 8.59* 1.50 - 8.10 k/uL Final    Absolute Lymph # 01/15/2021 1.00* 1.10 - 3.70 k/uL Final    Absolute Mono # 01/15/2021 0.17  0.10 - 1.20 k/uL Final    Absolute Eos # 01/15/2021 <0.03  0.00 - 0.44 k/uL Final    Basophils Absolute 01/15/2021 <0.03  0.00 - 0.20 k/uL Final    Absolute Immature Granulocyte 01/15/2021 0.09  0.00 - 0.30 k/uL Final    Glucose 01/15/2021 108* 70 - 99 mg/dL Final    BUN 01/15/2021 4* 6 - 20 mg/dL Final    CREATININE 01/15/2021 0.38* 0.50 - 0.90 mg/dL Final    Bun/Cre Ratio 01/15/2021 NOT REPORTED  9 - 20 Final    Calcium 01/15/2021 7.8* 8.6 - 10.4 mg/dL Final    Sodium 01/15/2021 137  135 - 144 mmol/L Final    Potassium 01/15/2021 3.3* 3.7 - 5.3 mmol/L Final    Chloride 01/15/2021 106  98 - 107 mmol/L Final    CO2 01/15/2021 20  20 - 31 mmol/L Final    Anion Gap 01/15/2021 11  9 - 17 mmol/L Final    Alkaline Phosphatase 01/15/2021 126* 35 - 104 U/L Final    ALT 01/15/2021 8  5 - 33 U/L Final    AST 01/15/2021 13  <32 U/L Final    Total Bilirubin 01/15/2021 0.31  0.3 - 1.2 mg/dL Final    Total Protein 01/15/2021 6.4  6.4 - 8.3 g/dL Final    Albumin 01/15/2021 3.2* 3.5 - 5.2 g/dL Final    Albumin/Globulin Ratio 01/15/2021 1.0  1.0 - 2.5 Final    GFR Non- 01/15/2021 >60  >60 mL/min Final    GFR  01/15/2021 >60  >60 mL/min Final    GFR Comment 01/15/2021        Final    Comment: Average GFR for 30-36 years old:   80 mL/min/1.73sq m  Chronic Kidney Disease:   <60 mL/min/1.73sq m  Kidney failure:   <15 mL/min/1.73sq m              eGFR calculated using average adult body mass.  Additional eGFR calculator available at:        [email or web address]            GFR Staging 01/15/2021 NOT REPORTED   Final    Expiration Date 01/15/2021 01/18/2021,2359   Final    Arm Band Number 01/15/2021 [id number]   Final    ABO/Rh 01/15/2021 A POSITIVE   Final    Antibody Screen 01/15/2021 NEGATIVE   Final    WBC 01/16/2021 11.4* 3.5 - 11.3 k/uL Final    RBC 01/16/2021 3.76* 3.95 - 5.11 m/uL Final    Hemoglobin 01/16/2021 10.6* 11.9 - 15.1 g/dL Final    Hematocrit 01/16/2021 33.5* 36.3 - 47.1 % Final    MCV 01/16/2021 89.1  82.6 - 102.9 fL Final    MCH 01/16/2021 28.2  25.2 - 33.5 pg Final    MCHC 01/16/2021 31.6  28.4 - 34.8 g/dL Final    RDW 01/16/2021 14.6* 11.8 - 14.4 % Final    Platelets 72/97/2126 243  138 - 453 k/uL Final    MPV 01/16/2021 11.6  8.1 - 13.5 fL Final    NRBC Automated 01/16/2021 0.0  0.0 per 100 WBC Final    Differential Type 01/16/2021 NOT REPORTED   Final    WBC Morphology 01/16/2021 NOT REPORTED   Final    RBC Morphology 01/16/2021 ANISOCYTOSIS PRESENT   Final    Platelet Estimate 22/22/2416 NOT REPORTED   Final    Seg Neutrophils 01/16/2021 87* 36 - 65 % Final    Lymphocytes 01/16/2021 9* 24 - 43 % Final    Monocytes 01/16/2021 3  3 - 12 % Final    Eosinophils % 01/16/2021 0* 1 - 4 % Final    Basophils 01/16/2021 0  0 - 2 % Final    Immature Granulocytes 01/16/2021 1* 0 % Final    Segs Absolute 01/16/2021 9.87* 1.50 - 8.10 k/uL Final    Absolute Lymph # 01/16/2021 1.02* 1.10 - 3.70 k/uL Final    Absolute Mono # 01/16/2021 0.38  0.10 - 1.20 k/uL Final    Absolute Eos # 01/16/2021 <0.03  0.00 - 0.44 k/uL Final    Basophils Absolute 01/16/2021 <0.03  0.00 - 0.20 k/uL Final    Absolute Immature Granulocyte 01/16/2021 0.11  0.00 - 0.30 k/uL Final    Glucose 01/16/2021 107* 70 - 99 mg/dL Final    BUN 01/16/2021 4* 6 - 20 mg/dL Final    CREATININE 01/16/2021 0.37* 0.50 - 0.90 mg/dL Final    Bun/Cre Ratio 01/16/2021 NOT REPORTED  9 - 20 Final    Calcium 01/16/2021 7.6* 8.6 - 10.4 mg/dL Final    Sodium 01/16/2021 136  135 - 144 mmol/L Final    Potassium 01/16/2021 3.2* 3.7 - 5.3 mmol/L Final    Chloride 01/16/2021 106  98 - 107 mmol/L Final    CO2 01/16/2021 20  20 - 31 mmol/L Final    Anion Gap 01/16/2021 10  9 - 17 mmol/L Final    Alkaline Phosphatase 01/16/2021 121* 35 - 104 U/L Final    ALT 01/16/2021 7  5 - 33 U/L Final    AST 01/16/2021 12  <32 U/L Final    Total Bilirubin 01/16/2021 0.27* 0.3 - 1.2 mg/dL Final    Total Protein 01/16/2021 6.3* 6.4 - 8.3 g/dL Final    Albumin 01/16/2021 3.2* 3.5 - 5.2 g/dL Final    Albumin/Globulin Ratio 01/16/2021 1.0  1.0 - 2.5 Final    GFR Non- 01/16/2021 >60  >60 mL/min Final    GFR  01/16/2021 >60  >60 mL/min Final    GFR Comment 01/16/2021        Final    Comment: Average GFR for 30-36 years old:   80 mL/min/1.73sq m  Chronic Kidney Disease:   <60 mL/min/1.73sq m  Kidney failure:   <15 mL/min/1.73sq m              eGFR calculated using average adult body mass.  Additional eGFR calculator available at:        [employer]            GFR Staging 01/16/2021 NOT REPORTED   Final   Admission on 01/14/2021, Discharged on 01/15/2021   Component Date Value Ref Range Status    WBC 01/14/2021 6.4  3.5 - 11.3 k/uL Final    RBC 01/14/2021 3.73* 3.95 - 5.11 m/uL Final    Hemoglobin 01/14/2021 10.4* 11.9 - 15.1 g/dL Final    Hematocrit 01/14/2021 32.1* 36.3 - 47.1 % Final    MCV 01/14/2021 86.1  82.6 - 102.9 fL Final    MCH 01/14/2021 27.9  25.2 - 33.5 pg Final    MCHC 01/14/2021 32.4  28.4 - 34.8 g/dL Final    RDW 01/14/2021 14.0  11.8 - 14.4 % Final    Platelets 15/54/7893 235  138 - 453 k/uL Final    MPV 01/14/2021 11.1  8.1 - 13.5 fL Final    NRBC Automated 01/14/2021 0.0  0.0 per 100 WBC Final    Differential Type 01/14/2021 NOT REPORTED   Final    Seg Neutrophils 01/14/2021 58  36 - 65 % Final    Lymphocytes 01/14/2021 26  24 - 43 % Final    Monocytes 01/14/2021 13* 3 - 12 % Final    Eosinophils % 01/14/2021 1  1 - 4 % Final    Basophils 01/14/2021 1  0 - 2 % Final    Immature Granulocytes 01/14/2021 1* 0 % Final    Segs Absolute 01/14/2021 3.72  1.50 - 8.10 k/uL Final    Absolute Lymph # 01/14/2021 1.64  1.10 - 3.70 k/uL Final    Absolute Mono # 01/14/2021 0.83  0.10 - 1.20 chronic treatment by dialysis or transplant            Protime 01/14/2021 12.8  11.5 - 14.2 sec Final    INR 01/14/2021 1.0   Final    Comment:       Non-therapeutic Range:     INR = 0.9-1.2  Therapeutic Range: Moderate Anticoagulant Intensity:     INR = 2.0-3.0   High Anticoagulant Intensity:     INR = 2.5-3.5            PTT 01/14/2021 27.4  23.9 - 33.8 sec Final    Comment:       IV Heparin Therapy Range:      62.0-94.0            Fibrinogen 01/14/2021 337  179 - 518 mg/dL Final    LD 01/14/2021 187  135 - 214 U/L Final    Uric Acid 01/14/2021 3.3  2.4 - 5.7 mg/dL Final    Color, UA 01/14/2021 YELLOW  YELLOW Final    Turbidity UA 01/14/2021 CLEAR  CLEAR Final    Glucose, Ur 01/14/2021 NEGATIVE  NEGATIVE Final    Bilirubin Urine 01/14/2021 NEGATIVE  NEGATIVE Final    Ketones, Urine 01/14/2021 NEGATIVE  NEGATIVE Final    Specific Gravity, UA 01/14/2021 1.015  1.010 - 1.020 Final    Urine Hgb 01/14/2021 NEGATIVE  NEGATIVE Final    pH, UA 01/14/2021 7.5  5.0 - 9.0 Final    Protein, UA 01/14/2021 NEGATIVE  NEGATIVE Final    Urobilinogen, Urine 01/14/2021 Normal  Normal Final    Nitrite, Urine 01/14/2021 NEGATIVE  NEGATIVE Final    Leukocyte Esterase, Urine 01/14/2021 NEGATIVE  NEGATIVE Final    Urinalysis Comments 01/14/2021 NOT REPORTED   Final    Total Protein, Urine 01/14/2021 13  mg/dL Final    No normal range established.     Creatinine, Ur 01/14/2021 72.5  28.0 - 217.0 mg/dL Final    Urine Total Protein Creatinine Rat* 01/14/2021 0.18  0.00 - 0.20 Final    Amphetamine Screen, Ur 01/14/2021 NEGATIVE  NEGATIVE Final    Barbiturate Screen, Ur 01/14/2021 NEGATIVE  NEGATIVE Final    Benzodiazepine Screen, Urine 01/14/2021 NEGATIVE  NEGATIVE Final    Cocaine Metabolite, Urine 01/14/2021 NEGATIVE  NEGATIVE Final    Methadone Screen, Urine 01/14/2021 NEGATIVE  NEGATIVE Final    Opiates, Urine 01/14/2021 NEGATIVE  NEGATIVE Final    Phencyclidine, Urine 01/14/2021 NEGATIVE  NEGATIVE Final    facility for care. Exceptions were reviewed including but not limited to: Decreased fetal movement, vaginal Bleeding or hemorrhage, trauma, readily expectant delivery, or any instance where she feels 911 should be utilized. The patient was counseled on Labor & Delivery. IOL tomorrow at 0600    Assessment:  1Denis Harris is a 27 y.o. female  2.   3. 35w6d    Patient Active Problem List    Diagnosis Date Noted    Pre-existing hypertension affecting pregnancy in third trimester 2021    33 weeks gestation of pregnancy 2021    HRP (high risk pregnancy) 2021    Chronic hypertension during pregnancy 01/15/2021    Hx cocaine use 01/15/2021     Overview Note:     Last use  per patient report  UDS negative 1/15/21      H/O dilation and curettage (2019) 01/15/2021    Hx trichomonas 01/15/2021    COVID pos 1/15/21 01/15/2021    S/p Celestone 1/15 and 1/16/21 01/15/2021    33 weeks gestation of pregnancy     Chronic hypertension complicating or reason for care during pregnancy, third trimester 2021    Abdominal pain 10/28/2020        Diagnosis Orders   1. Chronic hypertension complicating or reason for care during pregnancy, third trimester           Plan:  The patient will return to the office for her next visit in 2 weeks. See antepartum flow sheet.

## 2021-02-03 ENCOUNTER — ANESTHESIA EVENT (OUTPATIENT)
Dept: LABOR AND DELIVERY | Age: 31
DRG: 560 | End: 2021-02-03
Payer: MEDICARE

## 2021-02-03 ENCOUNTER — APPOINTMENT (OUTPATIENT)
Dept: LABOR AND DELIVERY | Age: 31
DRG: 560 | End: 2021-02-03
Payer: MEDICARE

## 2021-02-03 ENCOUNTER — HOSPITAL ENCOUNTER (INPATIENT)
Age: 31
LOS: 1 days | Discharge: HOME | DRG: 560 | End: 2021-02-04
Attending: ADVANCED PRACTICE MIDWIFE | Admitting: ADVANCED PRACTICE MIDWIFE
Payer: MEDICARE

## 2021-02-03 ENCOUNTER — ANESTHESIA (OUTPATIENT)
Dept: LABOR AND DELIVERY | Age: 31
DRG: 560 | End: 2021-02-03
Payer: MEDICARE

## 2021-02-03 DIAGNOSIS — O09.93 HIGH-RISK PREGNANCY IN THIRD TRIMESTER: Primary | ICD-10-CM

## 2021-02-03 PROBLEM — Z34.90 ENCOUNTER FOR INDUCTION OF LABOR: Status: ACTIVE | Noted: 2021-02-03

## 2021-02-03 LAB
-: NORMAL
ABSOLUTE EOS #: 0.07 K/UL (ref 0–0.44)
ABSOLUTE IMMATURE GRANULOCYTE: 0.07 K/UL (ref 0–0.3)
ABSOLUTE LYMPH #: 1.63 K/UL (ref 1.1–3.7)
ABSOLUTE MONO #: 0.5 K/UL (ref 0.1–1.2)
ALBUMIN SERPL-MCNC: 3.5 G/DL (ref 3.5–5.2)
ALBUMIN/GLOBULIN RATIO: 1 (ref 1–2.5)
ALP BLD-CCNC: 141 U/L (ref 35–104)
ALT SERPL-CCNC: <5 U/L (ref 5–33)
AMPHETAMINE SCREEN URINE: NEGATIVE
ANION GAP SERPL CALCULATED.3IONS-SCNC: 9 MMOL/L (ref 9–17)
AST SERPL-CCNC: 12 U/L
BARBITURATE SCREEN URINE: NEGATIVE
BASOPHILS # BLD: 0 % (ref 0–2)
BASOPHILS ABSOLUTE: <0.03 K/UL (ref 0–0.2)
BENZODIAZEPINE SCREEN, URINE: NEGATIVE
BILIRUB SERPL-MCNC: 0.38 MG/DL (ref 0.3–1.2)
BUN BLDV-MCNC: 6 MG/DL (ref 6–20)
BUN/CREAT BLD: 14 (ref 9–20)
BUPRENORPHINE URINE: NEGATIVE
CALCIUM SERPL-MCNC: 8.7 MG/DL (ref 8.6–10.4)
CANNABINOID SCREEN URINE: NEGATIVE
CHLORIDE BLD-SCNC: 102 MMOL/L (ref 98–107)
CO2: 23 MMOL/L (ref 20–31)
COCAINE METABOLITE, URINE: NEGATIVE
CREAT SERPL-MCNC: 0.42 MG/DL (ref 0.5–0.9)
CREATININE URINE: 222.9 MG/DL (ref 28–217)
DIFFERENTIAL TYPE: ABNORMAL
EOSINOPHILS RELATIVE PERCENT: 1 % (ref 1–4)
GFR AFRICAN AMERICAN: >60 ML/MIN
GFR NON-AFRICAN AMERICAN: >60 ML/MIN
GFR SERPL CREATININE-BSD FRML MDRD: ABNORMAL ML/MIN/{1.73_M2}
GFR SERPL CREATININE-BSD FRML MDRD: ABNORMAL ML/MIN/{1.73_M2}
GLUCOSE BLD-MCNC: 78 MG/DL (ref 70–99)
HCT VFR BLD CALC: 37.8 % (ref 36.3–47.1)
HEMOGLOBIN: 12.1 G/DL (ref 11.9–15.1)
IMMATURE GRANULOCYTES: 1 %
LYMPHOCYTES # BLD: 24 % (ref 24–43)
MCH RBC QN AUTO: 28.5 PG (ref 25.2–33.5)
MCHC RBC AUTO-ENTMCNC: 32 G/DL (ref 28.4–34.8)
MCV RBC AUTO: 89.2 FL (ref 82.6–102.9)
MDMA URINE: NORMAL
METHADONE SCREEN, URINE: NEGATIVE
METHAMPHETAMINE, URINE: NEGATIVE
MONOCYTES # BLD: 7 % (ref 3–12)
NRBC AUTOMATED: 0 PER 100 WBC
OPIATES, URINE: NEGATIVE
OXYCODONE SCREEN URINE: NEGATIVE
PDW BLD-RTO: 14.9 % (ref 11.8–14.4)
PHENCYCLIDINE, URINE: NEGATIVE
PLATELET # BLD: 183 K/UL (ref 138–453)
PLATELET ESTIMATE: ABNORMAL
PMV BLD AUTO: 12.7 FL (ref 8.1–13.5)
POTASSIUM SERPL-SCNC: 3.2 MMOL/L (ref 3.7–5.3)
PROPOXYPHENE, URINE: NEGATIVE
RBC # BLD: 4.24 M/UL (ref 3.95–5.11)
RBC # BLD: ABNORMAL 10*6/UL
REASON FOR REJECTION: NORMAL
SEG NEUTROPHILS: 67 % (ref 36–65)
SEGMENTED NEUTROPHILS ABSOLUTE COUNT: 4.66 K/UL (ref 1.5–8.1)
SODIUM BLD-SCNC: 134 MMOL/L (ref 135–144)
TEST INFORMATION: NORMAL
TOTAL PROTEIN, URINE: 30 MG/DL
TOTAL PROTEIN: 6.9 G/DL (ref 6.4–8.3)
TRICYCLIC ANTIDEPRESSANTS, UR: NEGATIVE
URINE TOTAL PROTEIN CREATININE RATIO: 0.13 (ref 0–0.2)
WBC # BLD: 7 K/UL (ref 3.5–11.3)
WBC # BLD: ABNORMAL 10*3/UL
ZZ NTE CLEAN UP: ORDERED TEST: NORMAL
ZZ NTE WITH NAME CLEAN UP: SPECIMEN SOURCE: NORMAL

## 2021-02-03 PROCEDURE — 6360000002 HC RX W HCPCS: Performed by: NURSE ANESTHETIST, CERTIFIED REGISTERED

## 2021-02-03 PROCEDURE — 6370000000 HC RX 637 (ALT 250 FOR IP): Performed by: ADVANCED PRACTICE MIDWIFE

## 2021-02-03 PROCEDURE — 2500000003 HC RX 250 WO HCPCS: Performed by: ADVANCED PRACTICE MIDWIFE

## 2021-02-03 PROCEDURE — 6360000002 HC RX W HCPCS: Performed by: OBSTETRICS & GYNECOLOGY

## 2021-02-03 PROCEDURE — 2580000003 HC RX 258: Performed by: ADVANCED PRACTICE MIDWIFE

## 2021-02-03 PROCEDURE — 36415 COLL VENOUS BLD VENIPUNCTURE: CPT

## 2021-02-03 PROCEDURE — 80306 DRUG TEST PRSMV INSTRMNT: CPT

## 2021-02-03 PROCEDURE — 2500000003 HC RX 250 WO HCPCS: Performed by: NURSE ANESTHETIST, CERTIFIED REGISTERED

## 2021-02-03 PROCEDURE — 10907ZC DRAINAGE OF AMNIOTIC FLUID, THERAPEUTIC FROM PRODUCTS OF CONCEPTION, VIA NATURAL OR ARTIFICIAL OPENING: ICD-10-PCS | Performed by: ADVANCED PRACTICE MIDWIFE

## 2021-02-03 PROCEDURE — 80053 COMPREHEN METABOLIC PANEL: CPT

## 2021-02-03 PROCEDURE — 6360000002 HC RX W HCPCS: Performed by: ADVANCED PRACTICE MIDWIFE

## 2021-02-03 PROCEDURE — 84156 ASSAY OF PROTEIN URINE: CPT

## 2021-02-03 PROCEDURE — 3700000025 EPIDURAL BLOCK: Performed by: NURSE ANESTHETIST, CERTIFIED REGISTERED

## 2021-02-03 PROCEDURE — 82570 ASSAY OF URINE CREATININE: CPT

## 2021-02-03 PROCEDURE — 85025 COMPLETE CBC W/AUTO DIFF WBC: CPT

## 2021-02-03 PROCEDURE — 1220000000 HC SEMI PRIVATE OB R&B

## 2021-02-03 PROCEDURE — 3E033VJ INTRODUCTION OF OTHER HORMONE INTO PERIPHERAL VEIN, PERCUTANEOUS APPROACH: ICD-10-PCS | Performed by: ADVANCED PRACTICE MIDWIFE

## 2021-02-03 RX ORDER — LIDOCAINE HYDROCHLORIDE 20 MG/ML
INJECTION, SOLUTION EPIDURAL; INFILTRATION; INTRACAUDAL; PERINEURAL PRN
Status: DISCONTINUED | OUTPATIENT
Start: 2021-02-03 | End: 2021-02-04 | Stop reason: SDUPTHER

## 2021-02-03 RX ORDER — NALOXONE HYDROCHLORIDE 0.4 MG/ML
0.4 INJECTION, SOLUTION INTRAMUSCULAR; INTRAVENOUS; SUBCUTANEOUS PRN
Status: DISCONTINUED | OUTPATIENT
Start: 2021-02-03 | End: 2021-02-04 | Stop reason: HOSPADM

## 2021-02-03 RX ORDER — NIFEDIPINE 10 MG/1
30 CAPSULE ORAL ONCE
Status: COMPLETED | OUTPATIENT
Start: 2021-02-03 | End: 2021-02-03

## 2021-02-03 RX ORDER — ONDANSETRON 2 MG/ML
4 INJECTION INTRAMUSCULAR; INTRAVENOUS EVERY 6 HOURS PRN
Status: DISCONTINUED | OUTPATIENT
Start: 2021-02-03 | End: 2021-02-04

## 2021-02-03 RX ORDER — SEVOFLURANE 250 ML/250ML
1 LIQUID RESPIRATORY (INHALATION) CONTINUOUS PRN
Status: DISCONTINUED | OUTPATIENT
Start: 2021-02-03 | End: 2021-02-04

## 2021-02-03 RX ORDER — CARBOPROST TROMETHAMINE 250 UG/ML
250 INJECTION, SOLUTION INTRAMUSCULAR PRN
Status: DISCONTINUED | OUTPATIENT
Start: 2021-02-03 | End: 2021-02-04

## 2021-02-03 RX ORDER — METHYLERGONOVINE MALEATE 0.2 MG/ML
200 INJECTION INTRAVENOUS PRN
Status: DISCONTINUED | OUTPATIENT
Start: 2021-02-03 | End: 2021-02-04

## 2021-02-03 RX ORDER — LIDOCAINE HYDROCHLORIDE 10 MG/ML
30 INJECTION, SOLUTION EPIDURAL; INFILTRATION; INTRACAUDAL; PERINEURAL PRN
Status: DISCONTINUED | OUTPATIENT
Start: 2021-02-03 | End: 2021-02-04

## 2021-02-03 RX ORDER — EPHEDRINE SULFATE/0.9% NACL/PF 50 MG/5 ML
5 SYRINGE (ML) INTRAVENOUS EVERY 5 MIN PRN
Status: DISCONTINUED | OUTPATIENT
Start: 2021-02-03 | End: 2021-02-04 | Stop reason: HOSPADM

## 2021-02-03 RX ORDER — SODIUM CHLORIDE, SODIUM LACTATE, POTASSIUM CHLORIDE, CALCIUM CHLORIDE 600; 310; 30; 20 MG/100ML; MG/100ML; MG/100ML; MG/100ML
INJECTION, SOLUTION INTRAVENOUS CONTINUOUS
Status: DISCONTINUED | OUTPATIENT
Start: 2021-02-03 | End: 2021-02-04

## 2021-02-03 RX ORDER — MISOPROSTOL 100 UG/1
900 TABLET ORAL PRN
Status: DISCONTINUED | OUTPATIENT
Start: 2021-02-03 | End: 2021-02-04

## 2021-02-03 RX ORDER — ROPIVACAINE HYDROCHLORIDE 2 MG/ML
INJECTION, SOLUTION EPIDURAL; INFILTRATION; PERINEURAL CONTINUOUS PRN
Status: DISCONTINUED | OUTPATIENT
Start: 2021-02-03 | End: 2021-02-04 | Stop reason: SDUPTHER

## 2021-02-03 RX ORDER — ROPIVACAINE HYDROCHLORIDE 2 MG/ML
12 INJECTION, SOLUTION EPIDURAL; INFILTRATION; PERINEURAL CONTINUOUS
Status: DISCONTINUED | OUTPATIENT
Start: 2021-02-03 | End: 2021-02-04 | Stop reason: HOSPADM

## 2021-02-03 RX ORDER — SODIUM CHLORIDE 0.9 % (FLUSH) 0.9 %
10 SYRINGE (ML) INJECTION PRN
Status: DISCONTINUED | OUTPATIENT
Start: 2021-02-03 | End: 2021-02-04

## 2021-02-03 RX ORDER — LABETALOL HYDROCHLORIDE 5 MG/ML
20 INJECTION, SOLUTION INTRAVENOUS ONCE
Status: COMPLETED | OUTPATIENT
Start: 2021-02-03 | End: 2021-02-03

## 2021-02-03 RX ORDER — ACETAMINOPHEN 325 MG/1
650 TABLET ORAL EVERY 4 HOURS PRN
Status: DISCONTINUED | OUTPATIENT
Start: 2021-02-03 | End: 2021-02-04

## 2021-02-03 RX ORDER — SODIUM CHLORIDE 0.9 % (FLUSH) 0.9 %
10 SYRINGE (ML) INJECTION EVERY 12 HOURS SCHEDULED
Status: DISCONTINUED | OUTPATIENT
Start: 2021-02-03 | End: 2021-02-04

## 2021-02-03 RX ADMIN — ROPIVACAINE HYDROCHLORIDE 12 ML/HR: 2 INJECTION, SOLUTION EPIDURAL; INFILTRATION at 22:28

## 2021-02-03 RX ADMIN — OXYTOCIN 1 MILLI-UNITS/MIN: 10 INJECTION INTRAVENOUS at 06:52

## 2021-02-03 RX ADMIN — SODIUM CHLORIDE, POTASSIUM CHLORIDE, SODIUM LACTATE AND CALCIUM CHLORIDE: 600; 310; 30; 20 INJECTION, SOLUTION INTRAVENOUS at 19:01

## 2021-02-03 RX ADMIN — LIDOCAINE HYDROCHLORIDE 2 ML: 20 INJECTION, SOLUTION EPIDURAL; INFILTRATION; INTRACAUDAL; PERINEURAL at 17:00

## 2021-02-03 RX ADMIN — DEXTROSE MONOHYDRATE: 50 INJECTION, SOLUTION INTRAVENOUS at 06:42

## 2021-02-03 RX ADMIN — DEXTROSE MONOHYDRATE 2.5 MILLION UNITS: 5 INJECTION INTRAVENOUS at 10:19

## 2021-02-03 RX ADMIN — NIFEDIPINE 30 MG: 10 CAPSULE ORAL at 14:31

## 2021-02-03 RX ADMIN — LABETALOL HYDROCHLORIDE 20 MG: 5 INJECTION INTRAVENOUS at 16:36

## 2021-02-03 RX ADMIN — SODIUM CHLORIDE, POTASSIUM CHLORIDE, SODIUM LACTATE AND CALCIUM CHLORIDE: 600; 310; 30; 20 INJECTION, SOLUTION INTRAVENOUS at 13:19

## 2021-02-03 RX ADMIN — ROPIVACAINE HYDROCHLORIDE 12 ML/HR: 2 INJECTION, SOLUTION EPIDURAL; INFILTRATION at 17:08

## 2021-02-03 RX ADMIN — SODIUM CHLORIDE, POTASSIUM CHLORIDE, SODIUM LACTATE AND CALCIUM CHLORIDE: 600; 310; 30; 20 INJECTION, SOLUTION INTRAVENOUS at 06:42

## 2021-02-03 RX ADMIN — LIDOCAINE HYDROCHLORIDE 3 ML: 20 INJECTION, SOLUTION EPIDURAL; INFILTRATION; INTRACAUDAL; PERINEURAL at 16:58

## 2021-02-03 RX ADMIN — DEXTROSE MONOHYDRATE 2.5 MILLION UNITS: 5 INJECTION INTRAVENOUS at 14:32

## 2021-02-03 RX ADMIN — DEXTROSE MONOHYDRATE 2.5 MILLION UNITS: 5 INJECTION INTRAVENOUS at 22:13

## 2021-02-03 RX ADMIN — ONDANSETRON 4 MG: 2 INJECTION INTRAMUSCULAR; INTRAVENOUS at 21:19

## 2021-02-03 RX ADMIN — NIFEDIPINE 30 MG: 10 CAPSULE ORAL at 07:43

## 2021-02-03 RX ADMIN — DEXTROSE MONOHYDRATE 2.5 MILLION UNITS: 5 INJECTION INTRAVENOUS at 18:13

## 2021-02-03 ASSESSMENT — PAIN SCALES - GENERAL: PAINLEVEL_OUTOF10: 0

## 2021-02-03 NOTE — PROGRESS NOTES
Department of Obstetrics and Gynecology   Progress Note      SUBJECTIVE:  Patient began to again have elevated BP's and therefore second dose of Procardia was given - Dr Chelsea Espinal was updated and agreeable at [phone]. Since then patient had decided would like epidural - CRNA was into room and patient was unable to tolerate the touch or pressure with prep therefore procedure was ceased and patient declined. Her BP's have continued to elevated therefore I presented to bedside to discuss/evaluate.      OBJECTIVE:      Vitals:    02/03/21 1551 02/03/21 1604 02/03/21 1613 02/03/21 1622   BP: (!) 163/96 (!) 165/93 (!) 158/91 (!) 154/94   Pulse: 110 99 91 92   Resp: 18 16     Temp:       TempSrc:       SpO2:       Weight:       Height:           Fetal heart rate:       Baseline Heart Rate:  145        Accelerations:  present       Long Term Variability:  moderate       Decelerations:  absent         Contraction frequency: 2-3 minutes    Membranes:  Ruptured clear fluid    Cervix:         Dilation:  Did not recheck                  ASSESSMENT & PLAN:    Discussed with patient uncontrolled HTN and need to consider additional management  Discussed that often times with epidural BP will begin to improve if related to pain and slight anxiety  Discussed IV labetalol but did not respond to this in pregnancy well with BP's and may not give long term effect  Discussed possibility for mag sulfate which if initiated needs transfer to tertiary care and possible c/s to get more aggressive control  Patient given time to discuss and DR Gary Elaine updated - will give IV Labetalol

## 2021-02-03 NOTE — PROGRESS NOTES
Patient requesting epidural.  B FREDERICK Duff updated in unit. Conde Organ for epidural at this time. Will not give IV bolus d/t increased bp's per order from CNM.

## 2021-02-03 NOTE — PROGRESS NOTES
Scott Baig [facility] notified via phone regarding Procardia immediate release ordered and given instead of Procardia extended release. Ok to switch to XL after 1-2 hours if immediate release ineffective.

## 2021-02-03 NOTE — PROGRESS NOTES
Pt jumped off bed when writer was assessing spinous process with thumb. Discussed with pt that she could not move like that during epidural placement. Pt agrees to proceed.   Writer begins localization with 22 gauge needle pt states she no longer wants epidural.

## 2021-02-03 NOTE — PROGRESS NOTES
B Sherin, CNM at bedside and aware of bp 161/92. Retaken per CNM request; 154/98. New order for Procardia 30mg immediate release received.

## 2021-02-03 NOTE — PROGRESS NOTES
B Sherin CNM notified via telephone of pt blood pressures and that she did not take her prescribed procardia this morning. Orders received for 30 mg XL of procardia.

## 2021-02-03 NOTE — PLAN OF CARE
Problem: Anxiety:  Goal: Level of anxiety will decrease  Description: Level of anxiety will decrease  Outcome: Ongoing     Problem: Breathing Pattern - Ineffective:  Goal: Able to breathe comfortably  Description: Able to breathe comfortably  Outcome: Ongoing     Problem: Fluid Volume - Imbalance:  Goal: Absence of imbalanced fluid volume signs and symptoms  Description: Absence of imbalanced fluid volume signs and symptoms  Outcome: Ongoing  Goal: Absence of intrapartum hemorrhage signs and symptoms  Description: Absence of intrapartum hemorrhage signs and symptoms  Outcome: Ongoing     Problem: Infection - Intrapartum Infection:  Goal: Will show no infection signs and symptoms  Description: Will show no infection signs and symptoms  Outcome: Ongoing     Problem: Labor Process - Prolonged:  Goal: Labor progression, first stage, within specified pattern  Description: Labor progression, first stage, within specified pattern  Outcome: Ongoing  Goal: Labor progession, second stage, within specified pattern  Description: Labor progession, second stage, within specified pattern  Outcome: Ongoing  Goal: Uterine contractions within specified parameters  Description: Uterine contractions within specified parameters  Outcome: Ongoing     Problem:  Screening:  Goal: Ability to make informed decisions regarding treatment has improved  Description: Ability to make informed decisions regarding treatment has improved  Outcome: Ongoing     Problem: Tissue Perfusion - Uteroplacental, Altered:  Description: [TRUNCATED] For intrapartum patients with recurrent variable decelerations of the fetal heart rate, consider transcervical amnioinfusion. For patients in labor, avoid prophylactic use of continuous maternal oxygen supplementation to prevent nonreassu . ..   Goal: Absence of abnormal fetal heart rate pattern  Description: Absence of abnormal fetal heart rate pattern  Outcome: Ongoing     Problem: Urinary Retention:  Goal: Experiences of bladder distention will decrease  Description: Experiences of bladder distention will decrease  Outcome: Ongoing  Goal: Urinary elimination within specified parameters  Description: Urinary elimination within specified parameters  Outcome: Ongoing

## 2021-02-03 NOTE — PROGRESS NOTES
B Sherin, REESEM updated via phone regarding fetal tachycardia of 180s-210s x15 minutes with occasional audible variables to 80s after mom returned to restroom. Also updated regarding maternal temp of 97.9, LR bolus given, maternal reposition x2, and Pitocin paused x5 minutes during tachycardic episode. CNM updated currently have returned to normal baseline with moderate variability and accelerations present. CNM states she has looked at the fetal monitoring strip and no new orders received.

## 2021-02-03 NOTE — PROGRESS NOTES
Dr. Arjun Jean-Baptiste notified via phone per Rohan Diaz RN regarding fetal tachycardia and LR bolus being given.

## 2021-02-03 NOTE — ANESTHESIA PROCEDURE NOTES
Epidural Block    Patient location during procedure: OB  Start time: 2/3/2021 4:44 PM  End time: 2/3/2021 5:08 PM  Reason for block: labor epidural  Staffing  Performed: resident/CRNA   Resident/CRNA: TK Mancilla CRNA  Preanesthetic Checklist  Completed: patient identified, IV checked, site marked, risks and benefits discussed, surgical consent, monitors and equipment checked, pre-op evaluation, timeout performed, anesthesia consent given, oxygen available and patient being monitored  Epidural  Patient position: sitting  Prep: ChloraPrep and site prepped and draped  Patient monitoring: continuous pulse ox and frequent blood pressure checks  Approach: midline  Location: lumbar (1-5)  Injection technique: UBALDO air  Provider prep: mask and sterile gloves  Needle  Needle type: Tuohy   Needle gauge: 17 G  Needle length: 3.5 in  Needle insertion depth: 7 cm  Catheter type: side hole  Catheter size: 19 G  Catheter at skin depth: 13 cm  Test dose: negative (5 ml lidocaine 1.5% +epi 1:200K)  Kit: Perifix FX BBraun  Lot number: [id number]  Expiration date: 1/1/2022  Assessment  Sensory level: T8  Hemodynamics: stable  Attempts: 1 (redirection x2 via same insertion site)  Additional Notes  1644 patient sitting, timeout, then skin prep and kit prep  1648 drape and 3 ml lidocaince 1% SQ infiltration'  1656 initial attempt at midline only tapping bone, needle withdrawn to SQ and move to right 1 cm due to right lateral curvature of spine. then achieved epidural space easily. Catheter threaded to 13 cm at skin  1657 test dose  1658 catheter taped in place while incrementally bolused with 2% lidocaine, as charted  1703 to low fowjorje with TOÑO  1708 epidural infusion initiated as charted and patient instructed on used of PCEA. Patient expresses understanding.

## 2021-02-03 NOTE — PROGRESS NOTES
Xavier Stubbs CNM updated at desk regarding Pitocin currently at 21mu/min. Ok to continue to increase to 24mu/min as ordered.

## 2021-02-03 NOTE — PROGRESS NOTES
Richy Espinoza CNM notified via phone regarding Procardia immediate release given instead of Procardia extended release. REESEM made aware pharmacy was notified and ok to switch to extended release after 1-2 hours if original dose given ineffective. No new orders received.

## 2021-02-03 NOTE — PROGRESS NOTES
Amanda Belle, CRNA to bedside. MARA Duff CNM staying at bedside for epidural placement. CRNA updated by CNM regarding Labetalol being given now for bps of 160s/90s.

## 2021-02-03 NOTE — H&P
Department of Obstetrics and Gynecology   Obstetrics History and Physical  Sarai Duff C.N.M  H&P Admission Inpatient  Note        CHIEF COMPLAINT:  Induction of labor secondary to pregnancy with CHTN that has been difficult to control     HISTORY OF PRESENT ILLNESS:      The patient is a 27 y.o. female at 44w0d. OB History        3    Para   1    Term   1            AB   1    Living   1       SAB   1    TAB        Ectopic        Molar        Multiple        Live Births   1            Patient presents with a chief complaint as above and is being admitted for induction    Estimated Due Date: Estimated Date of Delivery: 3/3/21    PRENATAL CARE:    Complicated by: chronic hypertension which was relatively controlled in pregnancy - however many visits patient would \"forget\" to take medications prior to arrival at office, did monitor BP's at home. At 33 weeks presented for evaluation of headache and was found to have severe range BP's and COVID positive. Was started on magnesium sulfate and transferred to tertiary care facility for further evaluation and care. Pre-Eclampsia was ruled out, medications changed to Procardia 30mg XL daily which was most recent increased to BID. Did not take medication this morning prior to arrival.     PAST OB HISTORY:  OB History        3    Para   1    Term   1            AB   1    Living   1       SAB   1    TAB        Ectopic        Molar        Multiple        Live Births   1                Past Medical History:        Diagnosis Date    Anemia     Cocaine abuse ([facility] [address]     last use 2019    Hypertension     Mental disorder     anxiety, depression    Trichomonal vulvovaginitis      Past Surgical History:        Procedure Laterality Date    DILATION AND CURETTAGE OF UTERUS  2019     Allergies:  Patient has no known allergies.     Social History:    Social History     Socioeconomic History    Marital status: Single     Spouse name: Not on file    Number of children: Not on file    Years of education: Not on file    Highest education level: Not on file   Occupational History    Not on file   Social Needs    Financial resource strain: Not on file    Food insecurity     Worry: Not on file     Inability: Not on file    Transportation needs     Medical: Not on file     Non-medical: Not on file   Tobacco Use    Smoking status: Never Smoker    Smokeless tobacco: Never Used   Substance and Sexual Activity    Alcohol use: Not Currently    Drug use: Never    Sexual activity: Yes   Lifestyle    Physical activity     Days per week: Not on file     Minutes per session: Not on file    Stress: Not on file   Relationships    Social connections     Talks on phone: Not on file     Gets together: Not on file     Attends [employer] service: Not on file     Active member of club or organization: Not on file     Attends meetings of clubs or organizations: Not on file     Relationship status: Not on file    Intimate partner violence     Fear of current or ex partner: Not on file     Emotionally abused: Not on file     Physically abused: Not on file     Forced sexual activity: Not on file   Other Topics Concern    Not on file   Social History Narrative    Not on file     Family History:       Problem Relation Age of Onset    Diabetes Brother     Diabetes Maternal Uncle     Diabetes Maternal Grandmother     High Blood Pressure Maternal Grandmother      Medications Prior to Admission:  Medications Prior to Admission: NIFEdipine (PROCARDIA XL) 30 MG extended release tablet, Take 1 tablet by mouth daily  Prenatal Vit-Fe Fum-FA-Omega (PNV PRENATAL PLUS MULTIVIT+DHA) 27-1 & 312 MG MISC, TAKE 1 TAB & 1 CAPSULE BY MOUTH ONE TIME A DAY  ferrous sulfate (IRON 325) 325 (65 Fe) MG tablet, Take 1 tablet by mouth daily (with breakfast)  Prenatal Vit-Fe Fumarate-FA (PRENATAL PO), Take by mouth  Blood Pressure Monitoring (OMRON 3 SERIES BP MONITOR) YASH, USE TO CHECK BLOOD PRESSURE ONE TIME A DAY  Blood Pressure Monitoring (BLOOD PRESSURE CUFF) MISC, 1 Units by Does not apply route daily    REVIEW OF SYSTEMS:    CONSTITUTIONAL:  negative  RESPIRATORY:  negative  CARDIOVASCULAR:  negative  GASTROINTESTINAL:  negative  ALLERGIC/IMMUNOLOGIC:  negative  NEUROLOGICAL:  negative  BEHAVIOR/PSYCH:  negative    PHYSICAL EXAM:  Vitals:    02/03/21 1057 02/03/21 1139 02/03/21 1226 02/03/21 1321   BP: 135/81 134/89 136/86 (!) 143/88   Pulse: 85 81 86 97   Resp: 16 16 16 16   Temp:  98.2 °F (36.8 °C)     TempSrc:  Oral     SpO2:       Weight:       Height:         General appearance:  awake, alert, cooperative, no apparent distress, and appears stated age  Neurologic:  Awake, alert, oriented to name, place and time. Lungs:  No increased work of breathing, good air exchange  Abdomen:  Soft, non tender, gravid, consistent with her gestational age, EFW on 1/26/2021 was 50%  Fetal heart rate:  Reassuring. Pelvis:  Adequate pelvis  Cervix: 3 cm 60% soft -2  Contraction frequency:  2-3 minutes, moderate to palpation    Membranes:  AROM was discussed with patient and her family, they did consent - it was performed without difficulty and noted return of clear odorless amniotic fluid - moderate amount.      Labs:   CBC:   Lab Results   Component Value Date    WBC 7.0 02/03/2021    RBC 4.24 02/03/2021    HGB 12.1 02/03/2021    HCT 37.8 02/03/2021    MCV 89.2 02/03/2021    MCH 28.5 02/03/2021    MCHC 32.0 02/03/2021    RDW 14.9 02/03/2021     02/03/2021    MPV 12.7 02/03/2021     CMP:    Lab Results   Component Value Date     02/03/2021    K 3.2 02/03/2021     02/03/2021    CO2 23 02/03/2021    BUN 6 02/03/2021    CREATININE 0.42 02/03/2021    GFRAA >60 02/03/2021    LABGLOM >60 02/03/2021    GLUCOSE 78 02/03/2021    PROT 6.9 02/03/2021    LABALBU 3.5 02/03/2021    CALCIUM 8.7 02/03/2021    BILITOT 0.38 02/03/2021    ALKPHOS 141 02/03/2021    AST 12 02/03/2021    ALT <5 02/03/2021 ASSESSMENT AND PLAN:    Estimated length of stay: 2 midnights or less    Active Problems:    Encounter for induction of labor, CHTN with poor control. S/P Celestone injections x2  Plan: IOL with Pitocin and AROM      Labor: Admit, anticipate normal delivery, routine labor orders  Fetus: Reassuring, Cat 1  GBS: Yes - treatment consists of PCN G q4h  Other: IV antibiotic therapy, pitocin    Dr Mark Hanks updated on patient status and plan of care - she is agreeable.        Manny HERMAN,C.N.M,2/3/2021 2:01 PM

## 2021-02-03 NOTE — PROGRESS NOTES
To bedside with MARA Duff CNM. Discussion regarding increased bp's and necessity for epidural placement to assist in lowering them. Patient agrees to try again with epidural placement.

## 2021-02-03 NOTE — PROGRESS NOTES
B FREDERICK Duff updated via phone regarding no discomfort per patient and Pitocin currently at 13mu/min. Fetal monitoring strip reviewed by CNM at office. No new orders received.

## 2021-02-03 NOTE — ANESTHESIA PRE PROCEDURE
 ondansetron (ZOFRAN) injection 4 mg  4 mg Intravenous Q6H PRN Sarai C Sherin, APRN - CNM        oxytocin (PITOCIN) 30 Units in sodium chloride 0.9 % 500 mL infusion  1 idris-units/min Intravenous Continuous Sarai Ramey Aneesh, APRN - CNM 23 mL/hr at 02/03/21 1422 23 idris-units/min at 02/03/21 1422    sodium chloride flush 0.9 % injection 10 mL  10 mL Intravenous 2 times per day Letty Louisemodesta, APRN - CNM        sodium chloride flush 0.9 % injection 10 mL  10 mL Intravenous PRN Sarai C Sherin, APRN - CNM        benzocaine-menthol (DERMOPLAST) 20-0.5 % spray   Topical PRN Sarai C Sherin, APRN - CNM        carboprost (HEMABATE) injection 250 mcg  250 mcg Intramuscular PRN Sarai C Sherin, APRN - CNM        methylergonovine (METHERGINE) injection 200 mcg  200 mcg Intramuscular PRN Sarai C Sherin, APRN - CNM        miSOPROStol (CYTOTEC) tablet 900 mcg  900 mcg Rectal PRN Sarai C Sherin, APRN - CNM        oxytocin (PITOCIN) 10 unit bolus from the bag  999 mL/hr Intravenous PRN Sarai C Sherin, APRN - CNM        And    oxytocin (PITOCIN) 30 Units in sodium chloride 0.9 % 500 mL infusion  166 idris-units/min Intravenous PRN Sarai C Sherin, APRN - CNM        witch hazel-glycerin (TUCKS) pad   Topical PRN Sarai C Sherin, APRN - CNM        lidocaine PF 1 % injection 30 mL  30 mL Other PRN Sarai C Sherin, APRN - CNM        labetalol (NORMODYNE;TRANDATE) injection 20 mg  20 mg Intravenous Once Sarai C Sherin, APRN - CNM           Allergies:  No Known Allergies    Problem List:    Patient Active Problem List   Diagnosis Code    Abdominal pain R10.9    Chronic hypertension complicating or reason for care during pregnancy, third trimester O10.913    Chronic hypertension during pregnancy O10.919    33 weeks gestation of pregnancy Z3A.33    Hx cocaine use F14.10    H/O dilation and curettage (2019) Z98.890    Hx trichomonas A59.9    COVID pos 1/15/21 U07.1  S/p Celestone 1/15 and 1/16/21 O47.9    HRP (high risk pregnancy) O09.90    Pre-existing hypertension affecting pregnancy in third trimester O10.913    33 weeks gestation of pregnancy Z3A.33    Encounter for induction of labor Z34.90       Past Medical History:        Diagnosis Date    Anemia     Cocaine abuse ([facility] [address]     last use 2019    Hypertension     Mental disorder     anxiety, depression    Trichomonal vulvovaginitis        Past Surgical History:        Procedure Laterality Date    DILATION AND CURETTAGE OF UTERUS  2019       Social History:    Social History     Tobacco Use    Smoking status: Never Smoker    Smokeless tobacco: Never Used   Substance Use Topics    Alcohol use: Not Currently                                Counseling given: Not Answered      Vital Signs (Current):   Vitals:    02/03/21 1551 02/03/21 1604 02/03/21 1613 02/03/21 1622   BP: (!) 163/96 (!) 165/93 (!) 158/91 (!) 154/94   Pulse: 110 99 91 92   Resp: 18 16 16 16   Temp:       TempSrc:       SpO2:       Weight:       Height:                                                  BP Readings from Last 3 Encounters:   02/03/21 (!) 154/94   02/02/21 132/68   01/28/21 (!) 144/68       NPO Status: Time of last liquid consumption: 0600                        Time of last solid consumption: 2000                        Date of last liquid consumption: 02/03/21                        Date of last solid food consumption: 02/02/21    BMI:   Wt Readings from Last 3 Encounters:   02/03/21 213 lb (96.6 kg)   02/02/21 213 lb 12.8 oz (97 kg)   01/28/21 212 lb 11.9 oz (96.5 kg)     Body mass index is 32.39 kg/m².     CBC:   Lab Results   Component Value Date    WBC 7.0 02/03/2021    RBC 4.24 02/03/2021    HGB 12.1 02/03/2021    HCT 37.8 02/03/2021    MCV 89.2 02/03/2021    RDW 14.9 02/03/2021     02/03/2021       CMP:   Lab Results   Component Value Date     02/03/2021    K 3.2 02/03/2021     02/03/2021    CO2 23 02/03/2021 BUN 6 02/03/2021    CREATININE 0.42 02/03/2021    GFRAA >60 02/03/2021    LABGLOM >60 02/03/2021    GLUCOSE 78 02/03/2021    PROT 6.9 02/03/2021    CALCIUM 8.7 02/03/2021    BILITOT 0.38 02/03/2021    ALKPHOS 141 02/03/2021    AST 12 02/03/2021    ALT <5 02/03/2021       POC Tests: No results for input(s): POCGLU, POCNA, POCK, POCCL, POCBUN, POCHEMO, POCHCT in the last 72 hours. Coags:   Lab Results   Component Value Date    PROTIME 12.8 01/14/2021    INR 1.0 01/14/2021    APTT 27.4 01/14/2021       HCG (If Applicable): No results found for: PREGTESTUR, PREGSERUM, HCG, HCGQUANT     ABGs: No results found for: PHART, PO2ART, QWZ8YKH, QYB3ERW, BEART, R4DCCTPE     Type & Screen (If Applicable):  No results found for: LABABO, LABRH    Drug/Infectious Status (If Applicable):  No results found for: HIV, HEPCAB    COVID-19 Screening (If Applicable):   Lab Results   Component Value Date    COVID19 DETECTED 01/15/2021         Anesthesia Evaluation   no history of anesthetic complications:   Airway: Mallampati: III  TM distance: >3 FB   Neck ROM: full  Mouth opening: > = 3 FB Dental: normal exam         Pulmonary:Negative Pulmonary ROS and normal exam                               Cardiovascular:    (+) hypertension (PIH):,                   Neuro/Psych:   Negative Neuro/Psych ROS              GI/Hepatic/Renal: Neg GI/Hepatic/Renal ROS            Endo/Other: Negative Endo/Other ROS                    Abdominal:           Vascular: negative vascular ROS. Anesthesia Plan      epidural     ASA 3             Anesthetic plan and risks discussed with patient.                       TK Barclay - CRNA   2/3/2021

## 2021-02-03 NOTE — FLOWSHEET NOTE
Patient presents to [facility] for scheduled IOL. Patient ambulates to room 205. Orientation of room performed.

## 2021-02-04 VITALS
SYSTOLIC BLOOD PRESSURE: 136 MMHG | RESPIRATION RATE: 16 BRPM | DIASTOLIC BLOOD PRESSURE: 89 MMHG | HEIGHT: 68 IN | OXYGEN SATURATION: 99 % | BODY MASS INDEX: 32.28 KG/M2 | WEIGHT: 213 LBS | TEMPERATURE: 98.9 F | HEART RATE: 104 BPM

## 2021-02-04 PROCEDURE — 7200000001 HC VAGINAL DELIVERY

## 2021-02-04 PROCEDURE — 6370000000 HC RX 637 (ALT 250 FOR IP): Performed by: ADVANCED PRACTICE MIDWIFE

## 2021-02-04 PROCEDURE — 59409 OBSTETRICAL CARE: CPT | Performed by: ADVANCED PRACTICE MIDWIFE

## 2021-02-04 PROCEDURE — 6360000002 HC RX W HCPCS: Performed by: ADVANCED PRACTICE MIDWIFE

## 2021-02-04 PROCEDURE — 6360000002 HC RX W HCPCS: Performed by: OBSTETRICS & GYNECOLOGY

## 2021-02-04 PROCEDURE — 2580000003 HC RX 258: Performed by: ADVANCED PRACTICE MIDWIFE

## 2021-02-04 PROCEDURE — 0UQMXZZ REPAIR VULVA, EXTERNAL APPROACH: ICD-10-PCS | Performed by: ADVANCED PRACTICE MIDWIFE

## 2021-02-04 PROCEDURE — 2500000003 HC RX 250 WO HCPCS: Performed by: ADVANCED PRACTICE MIDWIFE

## 2021-02-04 RX ORDER — METHYLERGONOVINE MALEATE 0.2 MG/ML
200 INJECTION INTRAVENOUS
Status: DISCONTINUED | OUTPATIENT
Start: 2021-02-04 | End: 2021-02-04 | Stop reason: HOSPADM

## 2021-02-04 RX ORDER — DEXTROSE MONOHYDRATE 50 MG/ML
INJECTION, SOLUTION INTRAVENOUS
Status: DISCONTINUED
Start: 2021-02-04 | End: 2021-02-04 | Stop reason: HOSPADM

## 2021-02-04 RX ORDER — IBUPROFEN 800 MG/1
800 TABLET ORAL EVERY 8 HOURS
Status: DISCONTINUED | OUTPATIENT
Start: 2021-02-04 | End: 2021-02-04 | Stop reason: HOSPADM

## 2021-02-04 RX ORDER — PENICILLIN G POTASSIUM 5000000 [IU]/1
INJECTION, POWDER, FOR SOLUTION INTRAMUSCULAR; INTRAVENOUS
Status: DISCONTINUED
Start: 2021-02-04 | End: 2021-02-04 | Stop reason: HOSPADM

## 2021-02-04 RX ORDER — SODIUM CHLORIDE 0.9 % (FLUSH) 0.9 %
10 SYRINGE (ML) INJECTION EVERY 12 HOURS SCHEDULED
Status: DISCONTINUED | OUTPATIENT
Start: 2021-02-04 | End: 2021-02-04 | Stop reason: HOSPADM

## 2021-02-04 RX ORDER — DEXTROSE MONOHYDRATE 50 MG/ML
INJECTION, SOLUTION INTRAVENOUS
Status: DISPENSED
Start: 2021-02-04 | End: 2021-02-04

## 2021-02-04 RX ORDER — ACETAMINOPHEN 325 MG/1
650 TABLET ORAL EVERY 4 HOURS PRN
Status: DISCONTINUED | OUTPATIENT
Start: 2021-02-04 | End: 2021-02-04 | Stop reason: HOSPADM

## 2021-02-04 RX ORDER — DOCUSATE SODIUM 100 MG/1
100 CAPSULE, LIQUID FILLED ORAL 2 TIMES DAILY PRN
Status: DISCONTINUED | OUTPATIENT
Start: 2021-02-04 | End: 2021-02-04 | Stop reason: HOSPADM

## 2021-02-04 RX ORDER — IBUPROFEN 800 MG/1
800 TABLET ORAL EVERY 8 HOURS
Qty: 120 TABLET | Refills: 3 | Status: SHIPPED | OUTPATIENT
Start: 2021-02-04 | End: 2022-04-21 | Stop reason: ALTCHOICE

## 2021-02-04 RX ORDER — LABETALOL HYDROCHLORIDE 5 MG/ML
20 INJECTION, SOLUTION INTRAVENOUS ONCE
Status: COMPLETED | OUTPATIENT
Start: 2021-02-04 | End: 2021-02-04

## 2021-02-04 RX ORDER — MODIFIED LANOLIN
OINTMENT (GRAM) TOPICAL PRN
Status: DISCONTINUED | OUTPATIENT
Start: 2021-02-04 | End: 2021-02-04 | Stop reason: HOSPADM

## 2021-02-04 RX ORDER — LABETALOL HYDROCHLORIDE 5 MG/ML
INJECTION, SOLUTION INTRAVENOUS
Status: DISCONTINUED
Start: 2021-02-04 | End: 2021-02-04 | Stop reason: WASHOUT

## 2021-02-04 RX ORDER — NIFEDIPINE 30 MG/1
30 TABLET, FILM COATED, EXTENDED RELEASE ORAL 2 TIMES DAILY
Status: DISCONTINUED | OUTPATIENT
Start: 2021-02-04 | End: 2021-02-04 | Stop reason: HOSPADM

## 2021-02-04 RX ORDER — PENICILLIN G POTASSIUM 5000000 [IU]/1
INJECTION, POWDER, FOR SOLUTION INTRAMUSCULAR; INTRAVENOUS
Status: DISPENSED
Start: 2021-02-04 | End: 2021-02-04

## 2021-02-04 RX ORDER — SODIUM CHLORIDE 0.9 % (FLUSH) 0.9 %
10 SYRINGE (ML) INJECTION PRN
Status: DISCONTINUED | OUTPATIENT
Start: 2021-02-04 | End: 2021-02-04 | Stop reason: HOSPADM

## 2021-02-04 RX ADMIN — SODIUM CHLORIDE, POTASSIUM CHLORIDE, SODIUM LACTATE AND CALCIUM CHLORIDE: 600; 310; 30; 20 INJECTION, SOLUTION INTRAVENOUS at 01:36

## 2021-02-04 RX ADMIN — BENZOCAINE AND LEVOMENTHOL: 200; 5 SPRAY TOPICAL at 09:20

## 2021-02-04 RX ADMIN — Medication 999 MILLI-UNITS/MIN: at 07:43

## 2021-02-04 RX ADMIN — ROPIVACAINE HYDROCHLORIDE 12 ML/HR: 2 INJECTION, SOLUTION EPIDURAL; INFILTRATION at 05:53

## 2021-02-04 RX ADMIN — NIFEDIPINE 30 MG: 30 TABLET, EXTENDED RELEASE ORAL at 08:57

## 2021-02-04 RX ADMIN — Medication 40 EACH: at 09:20

## 2021-02-04 RX ADMIN — IBUPROFEN 800 MG: 800 TABLET ORAL at 08:57

## 2021-02-04 RX ADMIN — DEXTROSE MONOHYDRATE 2.5 MILLION UNITS: 5 INJECTION INTRAVENOUS at 06:45

## 2021-02-04 RX ADMIN — DEXTROSE MONOHYDRATE 2.5 MILLION UNITS: 5 INJECTION INTRAVENOUS at 03:08

## 2021-02-04 RX ADMIN — Medication 166 ML/HR: at 08:03

## 2021-02-04 RX ADMIN — LABETALOL HYDROCHLORIDE 20 MG: 5 INJECTION INTRAVENOUS at 07:56

## 2021-02-04 RX ADMIN — OXYTOCIN 12 MILLI-UNITS/MIN: 10 INJECTION INTRAVENOUS at 05:51

## 2021-02-04 ASSESSMENT — PAIN SCALES - GENERAL
PAINLEVEL_OUTOF10: 4
PAINLEVEL_OUTOF10: 0

## 2021-02-04 ASSESSMENT — PAIN DESCRIPTION - DESCRIPTORS: DESCRIPTORS: PRESSURE

## 2021-02-04 NOTE — PROGRESS NOTES
Department of Obstetrics and Gynecology   Progress Note      SUBJECTIVE:  Patient currently repositioned to semi fowlers, comfortable with epidural.  Denies pain. BP's have been improved/stable since epidural and IV labetalol were given. OBJECTIVE:      Vitals:    21 1753 21 1807 21 1838 21 1909   BP: 132/81 122/79 134/79 (!) 145/82   Pulse: 102 86 94 92   Resp:       Temp:       TempSrc:       SpO2:       Weight:       Height:           Fetal heart rate:       Baseline Heart Rate:  140       Accelerations:  present       Long Term Variability:  moderate       Decelerations:  absent         Contraction frequency: 2-4 minutes    Membranes:  Ruptured clear fluid    Cervix:         Dilation:  4 cm         Effacement:  75%         Station:  -2         Position:  anterior             ASSESSMENT & PLAN:    Discussed  IUPC placement with patient - she did consent. With initial insertion attempt toward maternal right side - resistance met. Then attempted toward maternal left and again resistance met. Able to place without resistance anteriorly     Continue frequent position changes, pitocin.   Anticipate

## 2021-02-04 NOTE — FLOWSHEET NOTE
Tyrel Chahal CNM called and informed that patient would like to be discharged so she can go to [place] to be with . Vee Davalos states she can go home and to have her continue with her blood pressure medications and check blood pressures.

## 2021-02-04 NOTE — PROGRESS NOTES
Lactation education:    [] Latch/ good latch vs shallow latch/ steps to obtaining deep latch    [] How to know if infant is eating enough/ feedings per 24 hours, wet/dirty diapers    [] Feeding/satiety cues      Lactation education resources given:     [x]  How to Breastfeed your baby - [address] publication      [x]  Information on feeding cues     [x]  Support group handout    [x]  Breastpump cleaning guidelines - [facility]     [x]  Breastfeeding & Safe Sleep handout - [address] publication    [x]  Calling All Dads! Handout - [address] publication    []  Breast and Nipple Care - [facility]     []  [address]    []  [place]    []  Going Back to Work - [facility]    []  Preventing Engorgement - [facility]    Supplies given:    [x]  Brush, soap and basin for breastpump cleaning    [x]  [employer] pump set up with explanation    []  Insurance pump provided through [employer] to patient, patient verbalizes understanding.         Signed:  Akila Pedersen RN, BSN, IBCLC

## 2021-02-04 NOTE — FLOWSHEET NOTE
Patient off unit in stable condition. Departure Mode: with significant other.     Mobility at Departure: ambulatory    Discharged to: private residence    Time of Discharge: 1800

## 2021-02-04 NOTE — ANESTHESIA POSTPROCEDURE EVALUATION
Department of Anesthesiology  Postprocedure Note    Patient: Noemi Mack  MRN: [id number]  YOB: 1990  Date of evaluation: 2/4/2021  Time:  1:38 PM     Procedure Summary     Date: 02/03/21 Room / Location:     Anesthesia Start: 1644 Anesthesia Stop: 02/04/21 0735    Procedure: Labor Analgesia Diagnosis:     Scheduled Providers:  Responsible Provider: TK [place] CRNA    Anesthesia Type: epidural ASA Status: 3          Anesthesia Type: epidural    Ilir Phase I:      Ilir Phase II:      Last vitals: Reviewed and per EMR flowsheets. Anesthesia Post Evaluation    Patient location during evaluation: floor  Patient participation: complete - patient participated  Level of consciousness: awake and alert  Pain score: 3  Airway patency: patent  Nausea & Vomiting: no nausea and no vomiting  Complications: no  Cardiovascular status: blood pressure returned to baseline and hemodynamically stable  Respiratory status: acceptable  Hydration status: euvolemic  Comments: Pt reports baseline sensation, neurologically intact, complaints of \"sore back\".

## 2021-02-04 NOTE — L&D DELIVERY NOTE
Mother's Information    Labor Events     labor?: No  Rupture type: Artificial=AROM, Intact  Fluid color: Bloody Show  Fluid odor: None     Mother Delivery Information    Episiotomy: None  Lacerations: None  # of Repair Packets: 1  Surgical or Additional Est. Blood Loss (mL): 0 (View Only): Edit in Flowsheets   Combined Est. Blood Loss (mL): 0        Joey Moreno [[id number]]    Labor Events     labor?: No   steroids?: Full Course  Cervical ripening date/time:     Antibiotics received during labor?: Yes  Rupture date/time: 2/3/21 13:57:00   Rupture type: Artificial=AROM, Intact  Fluid color: Bloody Show  Fluid odor: None  Induction: Oxytocin, AROM  Indications for induction: Hypertension  Labor complications: Dysfunctional Labor          Labor Event Times    Labor onset date/time: 21 010 EST   Dilation complete date/time:  21 0704 EST   Start pushing:    Decision time (emergent ):        Anesthesia    Method: Epidural     Assisted Delivery Details    Forceps attempted?: No  Vacuum extractor attempted?: No     Document Additional Attempt       Document Additional Attempt             Shoulder Dystocia    Shoulder dystocia present?: No  Add Second Maneuver  Add Third Maneuver  Add Fourth Maneuver  Add Fifth Maneuver  Add Sixth Maneuver  Add Seventh Maneuver  Add Eighth Maneuver  Add Ninth Maneuver     [place] Presentation    Presentation: Vertex  Position: Right  _: Occiput  _: Posterior     [place] Information    Head delivery date/time: 2021 07:35:00   Changing the 's delivery date/time could affect patient care.:    Delivery date/time:  21 0735   Delivery type: Vaginal, Spontaneous    Details:        Delivery Providers    Delivering clinician: TK Anne CNM   Provider Role    JENNI Rincon RN       Cord    Complications: None  Delayed cord clamping?: Yes  Cord clamped date/time: 2021 0737  Cord blood disposition: Lab  Gases sent?: No  Stem cell collection (by provider): No     Placenta    Date/time: 2021 07:42:20  Removal: Spontaneous  Appearance: Intact  Disposition: Discarded     Delivery Resuscitation    Method: Stimulation     Apgars    Living status: Living  Apgars   1 Minute:  5 Minute:  10 Minute 15 Minute 20 Minute   Skin Color: 1  1       Heart Rate: 2  2       Reflex Irritability: 2  2       Muscle Tone: 2  2       Respiratory Effort: 2  2       Total: 9  9                  Skin to Skin    Skin to skin initiation date/time: 21 07:36:00   Skin to skin with:  Mother  Skin to skin end date/time:        [place] Measurements       Delivery Information    Episiotomy: None  Perineal lacerations: None    Labial laceration: left Repaired: Yes   Surgical or additional est. blood loss (mL): 0 (View Only): Edit in Flowsheets   Combined est. blood loss (mL): 0     Other Procedures    Procedures: None

## 2021-02-04 NOTE — PROGRESS NOTES
Department of Obstetrics and Gynecology   Progress Note      SUBJECTIVE:  Patient currently regarding pain with epidural in place. Desires position change and cervical exam.     OBJECTIVE:      Vitals:    02/03/21 2309 02/03/21 2314 02/03/21 2319 02/03/21 2324   BP:       Pulse:       Resp:       Temp:       TempSrc:       SpO2: 95% 96% 97% 98%   Weight:       Height:           Fetal heart rate:       Baseline Heart Rate:  135       Accelerations:  present       Long Term Variability:  moderate       Decelerations:  absent         Contraction frequency: 2-3 minutes, inadequate MVU as <200 currently    Membranes:  Ruptured clear fluid    Cervix:         Dilation:  5 cm         Effacement:  80%         Station:  -1         Position:  anterior             ASSESSMENT & PLAN:    Continue with frequent position changes  Pitocin per protocol  Monitor infant - currently category 1 tracing  Will continue to monitor and hopeful for vaginal delivery. Discussed that unless has fetal distress or lack of progress with adequate ctx then it is okay to continue to labor - she is agreeable.

## 2021-02-05 ENCOUNTER — HOSPITAL ENCOUNTER (OUTPATIENT)
Age: 31
Discharge: HOME | End: 2021-02-05
Payer: MEDICARE

## 2021-02-05 DIAGNOSIS — O09.93 HIGH-RISK PREGNANCY IN THIRD TRIMESTER: ICD-10-CM

## 2021-02-05 LAB — HIV AG/AB: NONREACTIVE

## 2021-02-05 PROCEDURE — 87389 HIV-1 AG W/HIV-1&-2 AB AG IA: CPT

## 2021-02-05 PROCEDURE — 36415 COLL VENOUS BLD VENIPUNCTURE: CPT

## 2021-02-05 NOTE — DISCHARGE SUMMARY
Discharge Summary    Date: 2021  Patient Name: Brandin Gamez YOB: 1990 Age: 27 y.o. Admit Date: 2/3/2021  Discharge Date: 2021  Discharge Condition: Good    Admission Diagnosis  Encounter for induction of labor (Z34.90)     Discharge Diagnosis  Active Problems: Encounter for induction of labor Vaginal deliveryResolved Problems:  * No resolved hospital problems. [facility] [facility] Stay  Narrative of Hospital Course:   female presented for IOL at 39 weeks for chronic hypertension that was poorly controlled     Consultants:  None    Surgeries/procedures Performed:  IOL with      Treatments:    Analgesia and Antibiotics        Discharge Plan/Disposition:  Home    Hospital/Incidental Findings Requiring Follow Up:    Patient Instructions:    Diet: Regular Diet    Activity:Activity as Tolerated and No Sex for  For number of days (if applicable): 42      Other Instructions: Monitor blood pressures at home call if any symptoms of elevated bp or pre-eclampsia    Provider Follow-Up:   No follow-ups on file.      Significant Diagnostic Studies:    Recent Labs:  Admission on 2021, Discharged on 2021WBC                                           Date: 2021Value: 7.0         Ref range: 3.5 - 11.3 k/uL    Status: FinalRBC                                           Date: 2021Value: 4.24        Ref range: 3.95 - 5.11 m/uL   Status: FinalHemoglobin                                    Date: 2021Value: 12.1        Ref range: 11.9 - 15.1 g/dL   Status: FinalHematocrit                                    Date: 2021Value: 37.8        Ref range: 36.3 - 47.1 %      Status: FinalMCV                                           Date: 2021Value: 89.2        Ref range: 82.6 - 102.9 fL    Status: [address]                                           Date: 2021Value: 28.5        Ref range: 25.2 - 33.5 pg     Status:  [address]                                          Date: 02/03/2021Value: 32.0        Ref range: 28.4 - 34.8 g/dL   Status: FinalRDW                                           Date: 02/03/2021Value: 14.9*       Ref range: 11.8 - 14.4 %      Status: FinalPlatelets                                     Date: 02/03/2021Value: 183         Ref range: 138 - 453 k/uL     Status: FinalMPV                                           Date: 02/03/2021Value: 12.7        Ref range: 8.1 - 13.5 fL      Status: FinalNRBC Automated                                Date: 02/03/2021Value: 0.0         Ref range: 0.0 per 100 WBC    Status: FinalDifferential Type                             Date: 02/03/2021Value: NOT REPORTED                     Status: FinalSeg Neutrophils                               Date: 02/03/2021Value: 67*         Ref range: 36 - 65 %          Status: FinalLymphocytes                                   Date: 02/03/2021Value: 24          Ref range: 24 - 43 %          Status: FinalMonocytes                                     Date: 02/03/2021Value: 7           Ref range: 3 - 12 %           Status: FinalEosinophils %                                 Date: 02/03/2021Value: 1           Ref range: 1 - 4 %            Status: FinalBasophils                                     Date: 02/03/2021Value: 0           Ref range: 0 - 2 %            Status: FinalImmature Granulocytes                         Date: 02/03/2021Value: 1*          Ref range: 0 %                Status: FinalSegs Absolute                                 Date: 02/03/2021Value: 4.66        Ref range: 1.50 - 8.10 k/uL   Status: FinalAbsolute Lymph #                              Date: 02/03/2021Value: 1.63        Ref range: 1.10 - 3.70 k/uL   Status: FinalAbsolute Mono #                               Date: 02/03/2021Value: 0.50        Ref range: 0.10 - 1.20 k/uL   Status: FinalAbsolute Eos #                                Date: 02/03/2021Value: 0.07        Ref range: 0.00 - 0.44 k/uL   Status: FinalBasophils Absolute Date: 02/03/2021Value: <0.03       Ref range: 0.00 - 0.20 k/uL   Status: FinalAbsolute Immature Granulocyte                 Date: 02/03/2021Value: 0.07        Ref range: 0.00 - 0.30 k/uL   Status: [address] [facility] Morphology                                Date: 02/03/2021Value: NOT REPORTED                     Status: FinalRBC Morphology                                Date: 02/03/2021Value: NOT REPORTED                     Status: FinalPlatelet Estimate                             Date: 02/03/2021Value: NOT REPORTED                     Status: FinalAmphetamine Screen, Ur                        Date: 02/03/2021Value: NEGATIVE    Ref range: NEGATIVE           Status: FinalBarbiturate Screen, Ur                        Date: 02/03/2021Value: NEGATIVE    Ref range: NEGATIVE           Status: FinalBenzodiazepine Screen, Urine                  Date: 02/03/2021Value: NEGATIVE    Ref range: NEGATIVE           Status: FinalCocaine Metabolite, Urine                     Date: 02/03/2021Value: NEGATIVE    Ref range: NEGATIVE           Status: FinalMethadone Screen, Urine                       Date: 02/03/2021Value: NEGATIVE    Ref range: NEGATIVE           Status: FinalOpiates, Urine                                Date: 02/03/2021Value: NEGATIVE    Ref range: NEGATIVE           Status: FinalPhencyclidine, Urine                          Date: 02/03/2021Value: NEGATIVE    Ref range: NEGATIVE           Status: FinalPropoxyphene, Urine                           Date: 02/03/2021Value: NEGATIVE    Ref range: NEGATIVE           Status: FinalCannabinoid Scrn, Ur                          Date: 02/03/2021Value: NEGATIVE    Ref range: NEGATIVE           Status: FinalOxycodone Screen, Ur                          Date: 02/03/2021Value: NEGATIVE    Ref range: NEGATIVE           Status: FinalMethamphetamine, Urine                        Date: 02/03/2021Value: NEGATIVE    Ref range: NEGATIVE           Status: FinalTricyclic Antidepressants, Urine              Date: 02/03/2021Value: NEGATIVE    Ref range: NEGATIVE           Status: Final              Comment: Drug screen results are to be used for medical purposes only. All positive results are unconfirmed. Testing for employment or legal uses should be sent to a reference laboratory for confirmation. MDMA, Urine                                   Date: 02/03/2021Value: NOT REPORTED                   Ref range: NEGATIVE           Status: FinalBuprenorphine Urine                           Date: 02/03/2021Value: NEGATIVE    Ref range: NEGATIVE           Status: FinalTest Information                              Date: 02/03/2021Value: NOT REPORTED                     Status: FinalTotal Protein, Urine                          Date: 02/03/2021Value: 30          Ref range: mg/dL              Status: Final              Comment: No normal range established. Creatinine, Ur                                Date: 02/03/2021Value: 222.9*      Ref range: 28.0 - 217.0 mg/*  Status: FinalUrine Total Protein Creatinine Rat*           Date: 02/03/2021Value: 0.13        Ref range: 0.00 - 0.20        Status: FinalGlucose                                       Date: 02/03/2021Value: 78          Ref range: 70 - 99 mg/dL      Status: FinalBUN                                           Date: 02/03/2021Value: 6           Ref range: 6 - 20 mg/dL       Status: FinalCREATININE                                    Date: 02/03/2021Value: 0.42*       Ref range: 0.50 - 0.90 mg/dL  Status: FinalBun/Cre Ratio                                 Date: 02/03/2021Value: 14          Ref range: 9 - 20             Status: FinalCalcium                                       Date: 02/03/2021Value: 8.7         Ref range: 8.6 - 10.4 mg/dL   Status: FinalSodium                                        Date: 02/03/2021Value: 134*        Ref range: 135 - 144 mmol/L   Status: FinalPotassium                                     Date: 02/03/2021Value: 3.2*        Ref range: 3.7 - 5.3 mmol/L   Status: FinalChloride                                      Date: 02/03/2021Value: 102         Ref range: 98 - 107 mmol/L    Status: FinalCO2                                           Date: 02/03/2021Value: 23          Ref range: 20 - 31 mmol/L     Status: FinalAnion Gap                                     Date: 02/03/2021Value: 9           Ref range: 9 - 17 mmol/L      Status: FinalAlkaline Phosphatase                          Date: 02/03/2021Value: 141*        Ref range: 35 - 104 U/L       Status: FinalALT                                           Date: 02/03/2021Value: <5*         Ref range: 5 - 33 U/L         Status: FinalAST                                           Date: 02/03/2021Value: 12          Ref range: <32 U/L            Status: FinalTotal Bilirubin                               Date: 02/03/2021Value: 0.38        Ref range: 0.3 - 1.2 mg/dL    Status: FinalTotal Protein                                 Date: 02/03/2021Value: 6.9         Ref range: 6.4 - 8.3 g/dL     Status: FinalAlbumin                                       Date: 02/03/2021Value: 3.5         Ref range: 3.5 - 5.2 g/dL     Status: FinalAlbumin/Globulin Ratio                        Date: 02/03/2021Value: 1.0         Ref range: 1.0 - 2.5          Status: FinalGFR Non-                      Date: 02/03/2021Value: >60         Ref range: >60 mL/min         Status: FinalGFR                           Date: 02/03/2021Value: >60         Ref range: >60 mL/min         Status: FinalGFR Comment                                   Date: 02/03/2021Value:               Status: Final              Comment: Average GFR for 30-36 years old: 107 mL/min/1.73sq mChronic Kidney Disease: <60 mL/min/1.73sq mKidney failure: <15 mL/min/1.73sq m        eGFR calculated using average adult body mass.  Additional eGFR calculator available at: [email or web address]    GFR Staging                                   Date: 02/03/2021Value:               Status: Final              Comment: Stage 1: Some kidney damage normal GFRStage 2: Mild kidney damage GFR 60-89 Stage 3: Moderate kidney damage GFR 30-59Stage 4: Severe kidney damage GFR 15-29Stage 5: Severe kidney damage GFR <15ESRD - chronic treatment by dialysis or transplant    Specimen Source                               Date: 02/03/2021Value: BLOOD         Status: FinalOrdered Test                                  Date: 02/03/2021Value: CP            Status: [facility] for Rejection                          Date: 02/03/2021Value: HEMOLYZED     Status: Final-                                             Date: 02/03/2021Value: NOT REPORTED                     Status: Final------------    Radiology last 7 days:  No results found.      [unfilled]    Discharge Medications    Discharge Medication List as of 2/4/2021  3:43 PMSTART taking these medicationsibuprofen (ADVIL;MOTRIN) 800 MG tabletTake 1 tablet by mouth every 8 hours, Disp-120 tablet, R-3Normal    Discharge Medication List as of 2/4/2021  3:43 PM    Discharge Medication List as of 2/4/2021  3:43 PMCONTINUE these medications which have NOT CHANGEDNIFEdipine (PROCARDIA XL) 30 MG extended release tabletTake 1 tablet by mouth daily, Disp-30 tablet, R-2PrintPrenatal Vit-Fe Fum-FA-Omega (PNV PRENATAL PLUS MULTIVIT+DHA) 27-1 & 312 MG MISCTAKE 1 TAB & 1 CAPSULE BY MOUTH ONE TIME A DAYHistorical Med    Discharge Medication List as of 2/4/2021  3:43 PMSTOP taking these medicationsBlood Pressure Monitoring (OMRON 3 SERIES BP MONITOR) DEVIComments:Reason for Stopping:Blood Pressure Monitoring (BLOOD PRESSURE CUFF) MISCComments:Reason for Stopping:ferrous sulfate (IRON 325) 325 (65 Fe) MG tabletComments:Reason for Stopping:Prenatal Vit-Fe Fumarate-FA (PRENATAL PO)Comments:Reason for Stopping:    Time Spent on Discharge:1E] minutes were

## 2021-02-18 ENCOUNTER — PROCEDURE VISIT (OUTPATIENT)
Dept: OBGYN CLINIC | Age: 31
End: 2021-02-18

## 2021-02-18 VITALS — SYSTOLIC BLOOD PRESSURE: 138 MMHG | DIASTOLIC BLOOD PRESSURE: 96 MMHG | WEIGHT: 196.8 LBS | BODY MASS INDEX: 29.92 KG/M2

## 2021-02-18 DIAGNOSIS — O13.9 GESTATIONAL HYPERTENSION, ANTEPARTUM: Primary | ICD-10-CM

## 2021-02-18 PROCEDURE — 99024 POSTOP FOLLOW-UP VISIT: CPT | Performed by: ADVANCED PRACTICE MIDWIFE

## 2021-02-18 NOTE — PROGRESS NOTES
Here for 2 wk PP BP check. Has not been taking Procardia since discharge from hospital. States she will pick script up today. Denies h/a, visual disturbances, swelling. BP today 140/100. Repeat 138/96. B Sherin aware and pt to  her BP medication and take BID and return in 1 wk for BP recheck. Pt states understanding. Reviewed s/s of increased BP with pt and she will call with any development of symptoms.

## 2021-02-22 ENCOUNTER — OFFICE VISIT (OUTPATIENT)
Dept: OBGYN CLINIC | Age: 31
End: 2021-02-22
Payer: MEDICARE

## 2021-02-22 VITALS
BODY MASS INDEX: 29.8 KG/M2 | DIASTOLIC BLOOD PRESSURE: 86 MMHG | SYSTOLIC BLOOD PRESSURE: 130 MMHG | HEIGHT: 68 IN | WEIGHT: 196.6 LBS

## 2021-02-22 DIAGNOSIS — N61.0 MASTITIS, ACUTE: Primary | ICD-10-CM

## 2021-02-22 PROCEDURE — G8417 CALC BMI ABV UP PARAM F/U: HCPCS | Performed by: ADVANCED PRACTICE MIDWIFE

## 2021-02-22 PROCEDURE — 1036F TOBACCO NON-USER: CPT | Performed by: ADVANCED PRACTICE MIDWIFE

## 2021-02-22 PROCEDURE — G8484 FLU IMMUNIZE NO ADMIN: HCPCS | Performed by: ADVANCED PRACTICE MIDWIFE

## 2021-02-22 PROCEDURE — G8427 DOCREV CUR MEDS BY ELIG CLIN: HCPCS | Performed by: ADVANCED PRACTICE MIDWIFE

## 2021-02-22 PROCEDURE — 1111F DSCHRG MED/CURRENT MED MERGE: CPT | Performed by: ADVANCED PRACTICE MIDWIFE

## 2021-02-22 PROCEDURE — 99213 OFFICE O/P EST LOW 20 MIN: CPT | Performed by: ADVANCED PRACTICE MIDWIFE

## 2021-02-22 ASSESSMENT — ENCOUNTER SYMPTOMS
RESPIRATORY NEGATIVE: 1
GASTROINTESTINAL NEGATIVE: 1

## 2021-02-23 NOTE — PROGRESS NOTES
PROBLEM VISIT     Date of service: 2021    Brenda Anthony  Is a 27 y.o.  female    PT's PCP is: No primary care provider on file. : 1990                                          Review of Systems   Constitutional: Positive for fatigue. Negative for fever. Respiratory: Negative. Cardiovascular: Negative. Gastrointestinal: Negative. Genitourinary: Negative. Skin:        Breast changes - warmth and redness         No LMP recorded. OB History    Para Term  AB Living   3 2 1 1 1 2   SAB TAB Ectopic Molar Multiple Live Births   1       0 2      # Outcome Date GA Lbr Eamon/2nd Weight Sex Delivery Anes PTL Lv   3  21 36w1d 05:55 / 00:31 6 lb 3.7 oz (2.826 kg) F Vag-Spont EPI N [facility]      Complications: Dysfunctional Labor   2 SAB            1 Term 12   7 lb 11 oz (3.487 kg) F Vag-Spont   [facility]        Social History     Tobacco Use   Smoking Status Never Smoker   Smokeless Tobacco Never Used        Social History     Substance and Sexual Activity   Alcohol Use Not Currently       Allergies: Patient has no known allergies. Current Outpatient Medications:     dicloxacillin (DYNAPEN) 500 MG capsule, Take 1 capsule by mouth 4 times daily for 10 days, Disp: 40 capsule, Rfl: 0    ibuprofen (ADVIL;MOTRIN) 800 MG tablet, Take 1 tablet by mouth every 8 hours, Disp: 120 tablet, Rfl: 3    NIFEdipine (PROCARDIA XL) 30 MG extended release tablet, Take 1 tablet by mouth daily, Disp: 30 tablet, Rfl: 2    Social History     Substance and Sexual Activity   Sexual Activity Yes       Chief Complaint   Patient presents with    Breast Problem     Pt c/o hard, swollen that is getting worse each day. Left side is worst then right. Physical Exam  Constitutional:       Appearance: Normal appearance. HENT:      Head: Normocephalic. Eyes:      Pupils: Pupils are equal, round, and reactive to light. Neck:      Musculoskeletal: Normal range of motion. Pulmonary:      Effort: Pulmonary effort is normal.   Chest:      Breasts:         Right: Nipple discharge, skin change and tenderness present. Left: Nipple discharge, skin change and tenderness present. Comments: Bilateral lactation from both breasts. No cracks to nipples or other skin breakdown. Musculoskeletal: Normal range of motion. Neurological:      Mental Status: She is alert and oriented to person, place, and time. Skin:     General: Skin is warm and dry. Vitals signs and nursing note reviewed. Vital Signs  Blood pressure 130/86, height 5' 8\" (1.727 m), weight 196 lb 9.6 oz (89.2 kg), unknown if currently breastfeeding. HPI Reports over the last 1+ weeks has noted increased tenderness, fullness to breasts bilaterally. She reports started on the left breast and has spread to the right. Does pump and direct feed infant. Admits that has not pumped in the last week due to pain. Has now developed redness, warmth to multiple areas. Denies fever, fatigue present. Still has milk production present. Would like to continue to breastfeed. Has tried warm compresses, motrin, dangle feeds, hand expression, changing infants position. Has not yet met with lactation but has a message to lactation at [facility] for assistance                              Assessment and Plan          Diagnosis Orders   1.  Mastitis, acute Discussed with patient that I suspect widespread plugged milk ducts have now led to development of mastitis. Discussed possible ineffective milk transfer, poor latch, improper pump fit, infection via nipple. Discussed importance of correcting any latch/pump issues (continue with plan to meet with lactation) in addition to treat for infection. Discussed that is high risk for developing breast abscess. Discussed start abx, should note improvement in the next 24-36 hours, continue motrin OTC for inflammation, frequent emptying of breasts. Discussed if s/s worsen or develops fever then call office - may need inpatient abx course. Discussed that needs f/u later this week - if worsening or no improvement then breast imaging. She is agreeable. I am having Kiet Guzman start on dicloxacillin. I am also having her maintain her NIFEdipine and ibuprofen. Return in about 3 days (around 2/25/2021), or With me breast exam.    She was also counseled on her preventative health maintenance recommendations and follow-up. There are no Patient Instructions on file for this visit.     Alex HERMAN,2/22/2021 8:29 PM                   Electronically signed by TK Mackey CNM

## 2021-02-24 ENCOUNTER — HOSPITAL ENCOUNTER (OUTPATIENT)
Dept: ULTRASOUND IMAGING | Age: 31
Discharge: HOME | End: 2021-02-26
Payer: MEDICARE

## 2021-02-24 ENCOUNTER — TELEPHONE (OUTPATIENT)
Dept: OBGYN CLINIC | Age: 31
End: 2021-02-24

## 2021-02-24 DIAGNOSIS — N61.0 MASTITIS: Primary | ICD-10-CM

## 2021-02-24 DIAGNOSIS — N61.0 MASTITIS: ICD-10-CM

## 2021-02-24 PROCEDURE — 76641 ULTRASOUND BREAST COMPLETE: CPT

## 2021-02-24 NOTE — TELEPHONE ENCOUNTER
Spoke with pt to see how she is feeling and she stated that she is still feeling sore and tender and today it started to ooze pus. Per DEWEY Duff pt needs USN ASAP. Pt stated she would like to go to [facility] for USN b/c she lives in [facility]. Order faxed to [facility].

## 2021-02-25 ENCOUNTER — HOSPITAL ENCOUNTER (OUTPATIENT)
Age: 31
Setting detail: SPECIMEN
Discharge: HOME | End: 2021-02-25
Payer: MEDICARE

## 2021-02-25 ENCOUNTER — OFFICE VISIT (OUTPATIENT)
Dept: OBGYN CLINIC | Age: 31
End: 2021-02-25

## 2021-02-25 VITALS
SYSTOLIC BLOOD PRESSURE: 128 MMHG | DIASTOLIC BLOOD PRESSURE: 84 MMHG | WEIGHT: 192.8 LBS | BODY MASS INDEX: 29.22 KG/M2 | HEIGHT: 68 IN

## 2021-02-25 DIAGNOSIS — L02.91 PHLEGMON: ICD-10-CM

## 2021-02-25 DIAGNOSIS — O92.79 OTHER DISORDERS OF LACTATION: ICD-10-CM

## 2021-02-25 DIAGNOSIS — L02.91 PHLEGMON: Primary | ICD-10-CM

## 2021-02-25 PROCEDURE — 99024 POSTOP FOLLOW-UP VISIT: CPT | Performed by: ADVANCED PRACTICE MIDWIFE

## 2021-02-25 RX ORDER — NIFEDIPINE 10 MG/1
10 CAPSULE ORAL 3 TIMES DAILY
COMMUNITY
End: 2022-07-18

## 2021-02-25 RX ORDER — SULFAMETHOXAZOLE AND TRIMETHOPRIM 800; 160 MG/1; MG/1
1 TABLET ORAL 2 TIMES DAILY
Qty: 20 TABLET | Refills: 0 | Status: SHIPPED | OUTPATIENT
Start: 2021-02-25 | End: 2021-03-07

## 2021-02-25 RX ORDER — NIFEDIPINE 30 MG/1
TABLET, FILM COATED, EXTENDED RELEASE ORAL
COMMUNITY
Start: 2021-02-19 | End: 2021-02-25

## 2021-02-25 NOTE — PROGRESS NOTES
PROBLEM VISIT     Date of service: 2021    Ajay Padron  Is a 27 y.o.  female    PT's PCP is: No primary care provider on file. : 1990                                          Review of Systems   Constitutional: Negative. Negative for fever. Skin: Positive for wound (left breast wound and continued mastitis s/s). Neurological: Negative. Psychiatric/Behavioral: Negative. No LMP recorded. OB History    Para Term  AB Living   3 2 1 1 1 2   SAB TAB Ectopic Molar Multiple Live Births   1       0 2      # Outcome Date GA Lbr Eamon/2nd Weight Sex Delivery Anes PTL Lv   3  21 36w1d 05:55 / 00:31 6 lb 3.7 oz (2.826 kg) F Vag-Spont EPI N [facility]      Complications: Dysfunctional Labor   2 SAB            1 Term 12   7 lb 11 oz (3.487 kg) F Vag-Spont   [facility]        Social History     Tobacco Use   Smoking Status Never Smoker   Smokeless Tobacco Never Used        Social History     Substance and Sexual Activity   Alcohol Use Not Currently       Allergies: Patient has no known allergies. Current Outpatient Medications:     NIFEdipine (PROCARDIA) 10 MG capsule, Take 10 mg by mouth 3 times daily, Disp: , Rfl:     sulfamethoxazole-trimethoprim (BACTRIM DS) 800-160 MG per tablet, Take 1 tablet by mouth 2 times daily for 10 days, Disp: 20 tablet, Rfl: 0    dicloxacillin (DYNAPEN) 500 MG capsule, Take 1 capsule by mouth 4 times daily for 10 days, Disp: 40 capsule, Rfl: 0    ibuprofen (ADVIL;MOTRIN) 800 MG tablet, Take 1 tablet by mouth every 8 hours, Disp: 120 tablet, Rfl: 3    Social History     Substance and Sexual Activity   Sexual Activity Yes       Chief Complaint   Patient presents with    Breast Problem     Pt still taking Procardia twice a day and taking antibotic. Pt states breast are feeling better, denies any oozing. Physical Exam  Constitutional:       Appearance: Normal appearance. She is normal weight.    Eyes:      Pupils: Pupils are equal, round, and reactive to light. Neck:      Musculoskeletal: Normal range of motion. Pulmonary:      Effort: Pulmonary effort is normal.   Chest:          Comments: Left breast - prior check this week was noted to have more diffuse redness that was more widespread on the breast - most has now resolved aside from the 6-9 o'clock region. Right breast - did have widespread redness and inflammation earlier this week which is greatly improved    Musculoskeletal: Normal range of motion. Neurological:      Mental Status: She is alert and oriented to person, place, and time. Psychiatric:         Behavior: Behavior normal.   Vitals signs and nursing note reviewed. Vital Signs  Blood pressure 128/84, height 5' 8\" (1.727 m), weight 192 lb 12.8 oz (87.5 kg), unknown if currently breastfeeding. HPI Recheck of mastitis s/s. Has not yet met with lactation consultants. Does continue to pump/hand express/and has attempted to direct feed again. Continues to produce milk. Afebrile. Taking Dicloxacillin. Yesterday when my nurse called to check in on patient she reported open sore to left breast which was draining therefore stat breast USN was ordered to be done at [facility]. Reports area is larger today than yesterday. Results reviewed today:    USN of left breast from [facility] 2/24/2021:\  Impression: \"Findings compatible with Celena Thony ower Phlegmon. No organized abscess formation. Prominent ducts consistent with recent nursing status. Advise repeat ultrasound approximately 1 month after completion of antibiotic therapy\". Assessment and Plan          Diagnosis Orders   1. Phlegmon  Culture, Wound   2.  Other disorders of lactation  Culture, Wound     Discussed concerns with patient - this is not responded appropriately to Dicloxacillin and that from what Dr Mona Mena and I researched the breast changes are often related to a staph infection and therefore will change her to Bactrim - recommend she pump/dump. Discussed due to nature of infection, continued progress of infectious process - recommend surgery consult. Patient prefers to see someone in [facility] - called to [address] [facility] - talked with nurse about patient and she discussed with provider. They would like to see patient today at 1600. Patient given their address and phone number - discussed with her that nurse from their office will be calling to do a pre-registration intake for her. Discussed that will send in new script for abx and wound culture obtained now that has drainage despite current abx course. Okay to stop dicloxacillin. Spent >30 minutes evaluating patient, reviewing imaging, collaborating with Dr Lala Cerrato and then consulted with surgeons office for referral.       I am having Florina Randolph start on sulfamethoxazole-trimethoprim. I am also having her maintain her ibuprofen, dicloxacillin, and NIFEdipine. Return Seeing surgery today at 4pm.  Just keep PPV. She was also counseled on her preventative health maintenance recommendations and follow-up. There are no Patient Instructions on file for this visit.     Sandro HERMAN,2/25/2021 12:17 PM                   Electronically signed by TK Owen CNM

## 2021-02-27 LAB
CULTURE: ABNORMAL
DIRECT EXAM: ABNORMAL
DIRECT EXAM: ABNORMAL
Lab: ABNORMAL
SPECIMEN DESCRIPTION: ABNORMAL

## 2022-04-21 ENCOUNTER — OFFICE VISIT (OUTPATIENT)
Dept: OBGYN CLINIC | Age: 32
End: 2022-04-21
Payer: MEDICARE

## 2022-04-21 VITALS
DIASTOLIC BLOOD PRESSURE: 102 MMHG | SYSTOLIC BLOOD PRESSURE: 140 MMHG | HEIGHT: 68 IN | BODY MASS INDEX: 30.16 KG/M2 | WEIGHT: 199 LBS

## 2022-04-21 DIAGNOSIS — I10 PRIMARY HYPERTENSION: ICD-10-CM

## 2022-04-21 DIAGNOSIS — N91.2 AMENORRHEA: Primary | ICD-10-CM

## 2022-04-21 PROCEDURE — 99214 OFFICE O/P EST MOD 30 MIN: CPT | Performed by: ADVANCED PRACTICE MIDWIFE

## 2022-04-21 PROCEDURE — G8417 CALC BMI ABV UP PARAM F/U: HCPCS | Performed by: ADVANCED PRACTICE MIDWIFE

## 2022-04-21 PROCEDURE — 1036F TOBACCO NON-USER: CPT | Performed by: ADVANCED PRACTICE MIDWIFE

## 2022-04-21 PROCEDURE — G8427 DOCREV CUR MEDS BY ELIG CLIN: HCPCS | Performed by: ADVANCED PRACTICE MIDWIFE

## 2022-04-21 RX ORDER — LABETALOL 100 MG/1
TABLET, FILM COATED ORAL
COMMUNITY
Start: 2022-04-21 | End: 2022-04-28

## 2022-04-21 ASSESSMENT — ENCOUNTER SYMPTOMS
RESPIRATORY NEGATIVE: 1
ABDOMINAL PAIN: 1

## 2022-04-21 NOTE — PROGRESS NOTES
PROBLEM VISIT     Date of service: 2022    Aimee Rodriguez  Is a 28 y.o.  female    PT's PCP is: No primary care provider on file. : 1990                                          Review of Systems   Constitutional: Negative. HENT: Negative. Respiratory: Negative. Cardiovascular: Negative. Gastrointestinal: Positive for abdominal pain (Left > Right). Genitourinary: Positive for menstrual problem (amenorrhea, +HPT) and pelvic pain. Neurological: Negative. Psychiatric/Behavioral: Negative. Patient's last menstrual period was 2022 (exact date). OB History    Para Term  AB Living   3 2 1 1 1 2   SAB IAB Ectopic Molar Multiple Live Births   1       0 2      # Outcome Date GA Lbr Eamon/2nd Weight Sex Delivery Anes PTL Lv   3  21 36w1d 05:55 / 00:31 6 lb 3.7 oz (2.826 kg) F Vag-Spont EPI N [facility]      Complications: Dysfunctional Labor   2 SAB            1 Term 12   7 lb 11 oz (3.487 kg) F Vag-Spont   [facility]        Social History     Tobacco Use   Smoking Status Never Smoker   Smokeless Tobacco Never Used        Social History     Substance and Sexual Activity   Alcohol Use Not Currently       Allergies: No known allergies      Current Outpatient Medications:     labetalol (NORMODYNE) 100 MG tablet, , Disp: , Rfl:     NIFEdipine (PROCARDIA) 10 MG capsule, Take 10 mg by mouth 3 times daily (Patient not taking: Reported on 2022), Disp: , Rfl:     Social History     Substance and Sexual Activity   Sexual Activity Yes       Chief Complaint   Patient presents with    Blood Pressure Check     Pt was at ER and had BP and would like checked today.  Pelvic Pain     Pt c/o mild pain on left side of pelvis. Physical Exam  Constitutional:       Appearance: Normal appearance. HENT:      Head: Normocephalic. Eyes:      Pupils: Pupils are equal, round, and reactive to light.    Pulmonary:      Effort: Pulmonary effort is normal. Musculoskeletal:         General: Normal range of motion. Cervical back: Normal range of motion. Neurological:      Mental Status: She is alert and oriented to person, place, and time. Skin:     General: Skin is warm and dry. Psychiatric:         Behavior: Behavior normal.   Vitals and nursing note reviewed. Vital Signs  Blood pressure (!) 140/108, height 5' 8\" (1.727 m), weight 199 lb (90.3 kg), last menstrual period 03/19/2022, unknown if currently breastfeeding. HPI Here for ER f/u. Went to [facility] ER early this morning due to abd pain. Reports started to have pelvic/abd discomfort - onset 4/19/22 but increased over the last 24 hours while at work. Reports initially pain was just \"cramping mild\" but then progressed to \"sharp stabbing\". Denies bleeding/spotting/abnormal vaginal discharge. Planned pregnancy. Continues to breastfeed. Certain of LMP 0/61/45 and had multiple + HPT from 4/12-4/19. ER did give Potassium tablets x 4. Was prescribed Labetalol 100mg PO BID but has not picked up rx as of yet. Was not taking Procardia prior to pregnancy either - reports \"my BP at home have been good\" and referred to 140/90's. Results reviewed today:    ER notes from [facility] did leave AMA  HCG level 839.5  BP - severe range HTN  USN - no IUP, possible corpus luteal cyst - cannot rule out ectopic                          Assessment and Plan          Diagnosis Orders   1. Amenorrhea  HCG, Quantitative, Pregnancy   2. Primary hypertension       Discussed  Rx for labetalol and take a dose as soon as home, continue with BID dosing. Discussed return in 1 week for BP check and visit  Discussed HCG level at [facility] 4/23/22 - order given to patient. Discussed repeat imaging in 3 weeks     Reviewed s/s of concern to present to ER for. Discussed will repeat CMP with New OB labs         I have discontinued Preethi Estrada's ibuprofen.  I am also having her maintain her NIFEdipine and labetalol. Return in about 1 week (around 4/28/2022), or 1 week BP check with me, 3 weeks USN and f/u with me, for Pelvic USN, Gyn f/u. She was also counseled on her preventative health maintenance recommendations and follow-up. There are no Patient Instructions on file for this visit.     TK Dobson CNM,4/21/2022 2:35 PM                   Electronically signed by TK Dobson CNM

## 2022-04-25 ENCOUNTER — HOSPITAL ENCOUNTER (OUTPATIENT)
Age: 32
Setting detail: SPECIMEN
Discharge: HOME | End: 2022-04-25

## 2022-04-25 DIAGNOSIS — I10 PRIMARY HYPERTENSION: ICD-10-CM

## 2022-04-25 DIAGNOSIS — N91.2 AMENORRHEA: ICD-10-CM

## 2022-04-25 LAB — HCG QUANTITATIVE: 4587 MIU/ML

## 2022-04-28 ENCOUNTER — OFFICE VISIT (OUTPATIENT)
Dept: OBGYN CLINIC | Age: 32
End: 2022-04-28
Payer: MEDICARE

## 2022-04-28 VITALS
HEIGHT: 68 IN | WEIGHT: 202.8 LBS | DIASTOLIC BLOOD PRESSURE: 100 MMHG | SYSTOLIC BLOOD PRESSURE: 138 MMHG | BODY MASS INDEX: 30.74 KG/M2

## 2022-04-28 DIAGNOSIS — I10 PRIMARY HYPERTENSION: Primary | ICD-10-CM

## 2022-04-28 PROCEDURE — 99213 OFFICE O/P EST LOW 20 MIN: CPT | Performed by: ADVANCED PRACTICE MIDWIFE

## 2022-04-28 PROCEDURE — G8427 DOCREV CUR MEDS BY ELIG CLIN: HCPCS | Performed by: ADVANCED PRACTICE MIDWIFE

## 2022-04-28 PROCEDURE — 1036F TOBACCO NON-USER: CPT | Performed by: ADVANCED PRACTICE MIDWIFE

## 2022-04-28 PROCEDURE — G8417 CALC BMI ABV UP PARAM F/U: HCPCS | Performed by: ADVANCED PRACTICE MIDWIFE

## 2022-04-28 RX ORDER — PNV NO.95/FERROUS FUM/FOLIC AC 28MG-0.8MG
1 TABLET ORAL DAILY
Qty: 30 TABLET | Refills: 12 | Status: SHIPPED | OUTPATIENT
Start: 2022-04-28

## 2022-04-28 RX ORDER — LABETALOL 100 MG/1
100 TABLET, FILM COATED ORAL 3 TIMES DAILY
Qty: 90 TABLET | Refills: 0 | Status: SHIPPED | OUTPATIENT
Start: 2022-04-28 | End: 2022-05-05

## 2022-04-28 ASSESSMENT — ENCOUNTER SYMPTOMS: GASTROINTESTINAL NEGATIVE: 1

## 2022-04-28 NOTE — PROGRESS NOTES
PROBLEM VISIT     Date of service: 2022    Laine Milton  Is a 28 y.o.  female    PT's PCP is: No primary care provider on file. : 1990                                          Review of Systems   Constitutional: Negative. HENT: Negative. Eyes: Negative for visual disturbance. Gastrointestinal: Negative. Genitourinary: Positive for menstrual problem (amenorrhea). Neurological: Positive for headaches (occ). Negative for dizziness. No LMP recorded. OB History    Para Term  AB Living   3 2 1 1 1 2   SAB IAB Ectopic Molar Multiple Live Births   1       0 2      # Outcome Date GA Lbr Eamon/2nd Weight Sex Delivery Anes PTL Lv   3  21 36w1d 05:55 / 00:31 6 lb 3.7 oz (2.826 kg) F Vag-Spont EPI N [facility]      Complications: Dysfunctional Labor   2 SAB            1 Term 12   7 lb 11 oz (3.487 kg) F Vag-Spont   [facility]        Social History     Tobacco Use   Smoking Status Never Smoker   Smokeless Tobacco Never Used        Social History     Substance and Sexual Activity   Alcohol Use Not Currently       Allergies: No known allergies      Current Outpatient Medications:     labetalol (NORMODYNE) 100 MG tablet, Take 1 tablet by mouth in the morning, at noon, and at bedtime, Disp: 90 tablet, Rfl: 0    Prenatal Vit-Fe Fumarate-FA (PRENATAL VITAMIN) 27-0.8 MG TABS, Take 1 tablet by mouth daily, Disp: 30 tablet, Rfl: 12    NIFEdipine (PROCARDIA) 10 MG capsule, Take 10 mg by mouth 3 times daily (Patient not taking: Reported on 2022), Disp: , Rfl:     Social History     Substance and Sexual Activity   Sexual Activity Yes       Chief Complaint   Patient presents with    Blood Pressure Check     Pt states BP is higher right after taking Labetolol. Physical Exam  Constitutional:       Appearance: Normal appearance. She is normal weight. HENT:      Head: Normocephalic. Eyes:      Pupils: Pupils are equal, round, and reactive to light. Pulmonary:      Effort: Pulmonary effort is normal.   Musculoskeletal:         General: Normal range of motion. Cervical back: Normal range of motion. Neurological:      Mental Status: She is alert and oriented to person, place, and time. Skin:     General: Skin is warm and dry. Psychiatric:         Behavior: Behavior normal.   Vitals and nursing note reviewed. Vital Signs  Blood pressure (!) 138/100, height 5' 8\" (1.727 m), weight 202 lb 12.8 oz (92 kg), unknown if currently breastfeeding. HPI Here for BP check - has not been on medications. Reports that once picked up rx from pharm she realized it was only a 5 day supply and ran out - reports after taking medication BP would increase further per home readings. Has been off medicine for a few days. Denies vision changes, occ headache. Assessment and Plan          Diagnosis Orders   1. Primary hypertension         Stressed importance of calling office if no rx. Discussed importance of adequate BP control. Will rx Labetalol 100mg TID and repeat in 1 week. Patient agreeable and will call if 140/90 at home or higher      I am having Anu De Leon start on labetalol and Prenatal Vitamin. I am also having her maintain her NIFEdipine. Return in about 1 week (around 5/5/2022), or BP check. She was also counseled on her preventative health maintenance recommendations and follow-up. There are no Patient Instructions on file for this visit.     [address] [facility] 2:22 PM                 Electronically signed by TK Roberts CNM

## 2022-05-05 ENCOUNTER — OFFICE VISIT (OUTPATIENT)
Dept: OBGYN CLINIC | Age: 32
End: 2022-05-05
Payer: MEDICARE

## 2022-05-05 VITALS
SYSTOLIC BLOOD PRESSURE: 140 MMHG | WEIGHT: 205.8 LBS | BODY MASS INDEX: 31.19 KG/M2 | HEIGHT: 68 IN | DIASTOLIC BLOOD PRESSURE: 96 MMHG

## 2022-05-05 DIAGNOSIS — I10 PRIMARY HYPERTENSION: Primary | ICD-10-CM

## 2022-05-05 PROCEDURE — G8427 DOCREV CUR MEDS BY ELIG CLIN: HCPCS | Performed by: ADVANCED PRACTICE MIDWIFE

## 2022-05-05 PROCEDURE — G8417 CALC BMI ABV UP PARAM F/U: HCPCS | Performed by: ADVANCED PRACTICE MIDWIFE

## 2022-05-05 PROCEDURE — 99214 OFFICE O/P EST MOD 30 MIN: CPT | Performed by: ADVANCED PRACTICE MIDWIFE

## 2022-05-05 PROCEDURE — 1036F TOBACCO NON-USER: CPT | Performed by: ADVANCED PRACTICE MIDWIFE

## 2022-05-05 RX ORDER — LABETALOL 200 MG/1
200 TABLET, FILM COATED ORAL 3 TIMES DAILY
Qty: 90 TABLET | Refills: 0 | Status: SHIPPED | OUTPATIENT
Start: 2022-05-05 | End: 2022-05-12

## 2022-05-05 ASSESSMENT — ENCOUNTER SYMPTOMS
RESPIRATORY NEGATIVE: 1
ABDOMINAL PAIN: 0

## 2022-05-05 NOTE — PROGRESS NOTES
PROBLEM VISIT     Date of service: 2022    Liberty Abreu  Is a 28 y.o.  female    PT's PCP is: No primary care provider on file. : 1990                                          Review of Systems   Constitutional: Negative. HENT: Negative. Respiratory: Negative. Cardiovascular: Negative. Gastrointestinal: Negative for abdominal pain. Genitourinary: Positive for menstrual problem (known pregnancy), pelvic pain (\"occassional twinges\") and vaginal bleeding (light spotting since end of week prior). Musculoskeletal: Negative. Neurological: Positive for dizziness. Negative for headaches. No LMP recorded. OB History    Para Term  AB Living   3 2 1 1 1 2   SAB IAB Ectopic Molar Multiple Live Births   1       0 2      # Outcome Date GA Lbr Eamon/2nd Weight Sex Delivery Anes PTL Lv   3  21 36w1d 05:55 / 00:31 6 lb 3.7 oz (2.826 kg) F Vag-Spont EPI N [facility]      Complications: Dysfunctional Labor   2 2019           1 Term 12   7 lb 11 oz (3.487 kg) F Vag-Spont   [facility]        Social History     Tobacco Use   Smoking Status Never Smoker   Smokeless Tobacco Never Used        Social History     Substance and Sexual Activity   Alcohol Use Not Currently       Allergies: No known allergies      Current Outpatient Medications:     labetalol (NORMODYNE) 100 MG tablet, Take 1 tablet by mouth in the morning, at noon, and at bedtime, Disp: 90 tablet, Rfl: 0    Prenatal Vit-Fe Fumarate-FA (PRENATAL VITAMIN) 27-0.8 MG TABS, Take 1 tablet by mouth daily, Disp: 30 tablet, Rfl: 12    NIFEdipine (PROCARDIA) 10 MG capsule, Take 10 mg by mouth 3 times daily , Disp: , Rfl:     Social History     Substance and Sexual Activity   Sexual Activity Yes       Chief Complaint   Patient presents with    Blood Pressure Check     Pt is checking BP's and getting 143/97. Pt c/o feelling dizzy today. Physical Exam  Constitutional:       Appearance: Normal appearance.  She is normal weight. HENT:      Head: Normocephalic. Eyes:      Pupils: Pupils are equal, round, and reactive to light. Pulmonary:      Effort: Pulmonary effort is normal.   Musculoskeletal:         General: Normal range of motion. Cervical back: Normal range of motion. Neurological:      Mental Status: She is alert and oriented to person, place, and time. Skin:     General: Skin is warm and dry. Psychiatric:         Behavior: Behavior normal.   Vitals and nursing note reviewed. Vital Signs  Blood pressure (!) 140/96, height 5' 8\" (1.727 m), weight 205 lb 12.8 oz (93.4 kg), unknown if currently breastfeeding. HPI Here for 1 week BP check. Reports that has been taking Labetalol 100mg PO TID and BP \"been good\". However, with recall she repots having 143/97 at home - unable to recall any further. Reports bleeding since end of last week - light red spotting - was evaluated at [facility] ER for this and elevated BP there also again. Denies HA, vision changes. Reports little dizzy today - diet recall, high sugar intake and no protein intake today. Results reviewed today:    Reviewed ER records via care everywhere - USN was done - will need repeat in our office due to the early gestation of pregnancy. HCG level was 48755                              Assessment and Plan          Diagnosis Orders   1. Primary hypertension       Consulted Dr Phyllis Milton after review of ER records - will increase labetalol to 200mg PO TID and repeat again in 1 week. Discussed to call if 140/90 or higher at home - she verbalized understanding. Repeat USN next week        I am having Anu De Leon start on labetalol. I am also having her maintain her NIFEdipine and Prenatal Vitamin. No follow-ups on file. She was also counseled on her preventative health maintenance recommendations and follow-up. There are no Patient Instructions on file for this visit.     TK Roberts - FREDERICK,5/5/2022 2:52 PM                   Electronically signed by Julieanne Sicard, APRN - CNM

## 2022-05-12 ENCOUNTER — OFFICE VISIT (OUTPATIENT)
Dept: OBGYN CLINIC | Age: 32
End: 2022-05-12
Payer: MEDICARE

## 2022-05-12 VITALS — DIASTOLIC BLOOD PRESSURE: 92 MMHG | BODY MASS INDEX: 30.47 KG/M2 | WEIGHT: 200.4 LBS | SYSTOLIC BLOOD PRESSURE: 132 MMHG

## 2022-05-12 DIAGNOSIS — I10 PRIMARY HYPERTENSION: ICD-10-CM

## 2022-05-12 DIAGNOSIS — N91.1 AMENORRHEA, SECONDARY: Primary | ICD-10-CM

## 2022-05-12 PROCEDURE — 1036F TOBACCO NON-USER: CPT | Performed by: ADVANCED PRACTICE MIDWIFE

## 2022-05-12 PROCEDURE — 99213 OFFICE O/P EST LOW 20 MIN: CPT | Performed by: ADVANCED PRACTICE MIDWIFE

## 2022-05-12 PROCEDURE — G8427 DOCREV CUR MEDS BY ELIG CLIN: HCPCS | Performed by: ADVANCED PRACTICE MIDWIFE

## 2022-05-12 PROCEDURE — G8417 CALC BMI ABV UP PARAM F/U: HCPCS | Performed by: ADVANCED PRACTICE MIDWIFE

## 2022-05-12 RX ORDER — LABETALOL 100 MG/1
300 TABLET, FILM COATED ORAL 3 TIMES DAILY
Qty: 30 TABLET | Refills: 1 | Status: SHIPPED | OUTPATIENT
Start: 2022-05-12 | End: 2022-05-19 | Stop reason: SDUPTHER

## 2022-05-12 ASSESSMENT — ENCOUNTER SYMPTOMS
RESPIRATORY NEGATIVE: 1
GASTROINTESTINAL NEGATIVE: 1

## 2022-05-18 ENCOUNTER — TELEPHONE (OUTPATIENT)
Dept: OBGYN CLINIC | Age: 32
End: 2022-05-18

## 2022-05-18 NOTE — TELEPHONE ENCOUNTER
Pratibha Henry from [facility] called regarding the Labetalol script. As it is written it is only for a 10 day supply. Did you want it for 10 days or for longer. Please advise. [facility]'s call back number is [phone].

## 2022-05-19 RX ORDER — LABETALOL 100 MG/1
TABLET, FILM COATED ORAL
Qty: 270 TABLET | Refills: 1 | Status: SHIPPED | OUTPATIENT
Start: 2022-05-19 | End: 2022-08-08 | Stop reason: SDUPTHER

## 2022-05-25 ENCOUNTER — INITIAL PRENATAL (OUTPATIENT)
Dept: OBGYN CLINIC | Age: 32
End: 2022-05-25
Payer: MEDICARE

## 2022-05-25 ENCOUNTER — HOSPITAL ENCOUNTER (OUTPATIENT)
Age: 32
Setting detail: SPECIMEN
Discharge: HOME | End: 2022-05-25

## 2022-05-25 VITALS
WEIGHT: 197 LBS | SYSTOLIC BLOOD PRESSURE: 128 MMHG | HEIGHT: 68 IN | BODY MASS INDEX: 29.86 KG/M2 | DIASTOLIC BLOOD PRESSURE: 88 MMHG

## 2022-05-25 DIAGNOSIS — O99.210 OBESITY IN PREGNANCY: ICD-10-CM

## 2022-05-25 DIAGNOSIS — Z34.81 ENCOUNTER FOR SUPERVISION OF OTHER NORMAL PREGNANCY IN FIRST TRIMESTER: Primary | ICD-10-CM

## 2022-05-25 DIAGNOSIS — O10.919 CHRONIC HYPERTENSION AFFECTING PREGNANCY: ICD-10-CM

## 2022-05-25 DIAGNOSIS — Z34.81 ENCOUNTER FOR SUPERVISION OF OTHER NORMAL PREGNANCY IN FIRST TRIMESTER: ICD-10-CM

## 2022-05-25 LAB
-: ABNORMAL
ABO/RH: NORMAL
ABSOLUTE EOS #: 0.07 K/UL (ref 0–0.44)
ABSOLUTE IMMATURE GRANULOCYTE: <0.03 K/UL (ref 0–0.3)
ABSOLUTE LYMPH #: 1.44 K/UL (ref 1.1–3.7)
ABSOLUTE MONO #: 0.33 K/UL (ref 0.1–1.2)
ALBUMIN SERPL-MCNC: 4.1 G/DL (ref 3.5–5.2)
ALBUMIN/GLOBULIN RATIO: 1.5 (ref 1–2.5)
ALP BLD-CCNC: 74 U/L (ref 35–104)
ALT SERPL-CCNC: 6 U/L (ref 5–33)
AMPHETAMINE SCREEN URINE: NEGATIVE
ANION GAP SERPL CALCULATED.3IONS-SCNC: 13 MMOL/L (ref 9–17)
ANTIBODY SCREEN: NEGATIVE
AST SERPL-CCNC: 10 U/L
BACTERIA: ABNORMAL
BARBITURATE SCREEN URINE: NEGATIVE
BASOPHILS # BLD: 1 % (ref 0–2)
BASOPHILS ABSOLUTE: 0.03 K/UL (ref 0–0.2)
BENZODIAZEPINE SCREEN, URINE: NEGATIVE
BILIRUB SERPL-MCNC: 0.48 MG/DL (ref 0.3–1.2)
BILIRUBIN URINE: NEGATIVE
BUN BLDV-MCNC: 6 MG/DL (ref 6–20)
CALCIUM SERPL-MCNC: 8.7 MG/DL (ref 8.6–10.4)
CANNABINOID SCREEN URINE: NEGATIVE
CASTS UA: ABNORMAL /LPF (ref 0–8)
CHLORIDE BLD-SCNC: 100 MMOL/L (ref 98–107)
CO2: 23 MMOL/L (ref 20–31)
COCAINE METABOLITE, URINE: NEGATIVE
COLOR: YELLOW
CREAT SERPL-MCNC: 0.47 MG/DL (ref 0.5–0.9)
CREATININE URINE: 177.1 MG/DL (ref 28–217)
EOSINOPHILS RELATIVE PERCENT: 2 % (ref 1–4)
EPITHELIAL CELLS UA: ABNORMAL /HPF (ref 0–5)
GFR AFRICAN AMERICAN: >60 ML/MIN
GFR NON-AFRICAN AMERICAN: >60 ML/MIN
GFR SERPL CREATININE-BSD FRML MDRD: ABNORMAL ML/MIN/{1.73_M2}
GLUCOSE BLD-MCNC: 74 MG/DL (ref 70–99)
GLUCOSE URINE: NEGATIVE
HCT VFR BLD CALC: 36 % (ref 36.3–47.1)
HEMOGLOBIN: 12.2 G/DL (ref 11.9–15.1)
HEPATITIS B SURFACE ANTIGEN: NONREACTIVE
HEPATITIS C ANTIBODY: NONREACTIVE
HIV AG/AB: NONREACTIVE
IMMATURE GRANULOCYTES: 0 %
KETONES, URINE: NEGATIVE
LEUKOCYTE ESTERASE, URINE: ABNORMAL
LYMPHOCYTES # BLD: 33 % (ref 24–43)
MCH RBC QN AUTO: 28.4 PG (ref 25.2–33.5)
MCHC RBC AUTO-ENTMCNC: 33.9 G/DL (ref 28.4–34.8)
MCV RBC AUTO: 83.7 FL (ref 82.6–102.9)
METHADONE SCREEN, URINE: NEGATIVE
MONOCYTES # BLD: 8 % (ref 3–12)
NITRITE, URINE: NEGATIVE
NRBC AUTOMATED: 0 PER 100 WBC
OPIATES, URINE: NEGATIVE
OXYCODONE SCREEN URINE: NEGATIVE
PDW BLD-RTO: 13.9 % (ref 11.8–14.4)
PH UA: 7.5 (ref 5–8)
PHENCYCLIDINE, URINE: NEGATIVE
PLATELET # BLD: 288 K/UL (ref 138–453)
PMV BLD AUTO: 11.4 FL (ref 8.1–13.5)
POTASSIUM SERPL-SCNC: 3.3 MMOL/L (ref 3.7–5.3)
PROTEIN UA: ABNORMAL
RBC # BLD: 4.3 M/UL (ref 3.95–5.11)
RBC UA: ABNORMAL /HPF (ref 0–4)
RUBV IGG SER QL: 13.9 IU/ML
SEG NEUTROPHILS: 56 % (ref 36–65)
SEGMENTED NEUTROPHILS ABSOLUTE COUNT: 2.46 K/UL (ref 1.5–8.1)
SODIUM BLD-SCNC: 136 MMOL/L (ref 135–144)
SPECIFIC GRAVITY UA: 1.02 (ref 1–1.03)
T. PALLIDUM, IGG: NONREACTIVE
TEST INFORMATION: NORMAL
TOTAL PROTEIN, URINE: 37 MG/DL
TOTAL PROTEIN: 6.8 G/DL (ref 6.4–8.3)
TURBIDITY: CLEAR
URIC ACID: 3.1 MG/DL (ref 2.4–5.7)
URINE HGB: NEGATIVE
URINE TOTAL PROTEIN CREATININE RATIO: 0.21 (ref 0–0.2)
UROBILINOGEN, URINE: NORMAL
WBC # BLD: 4.3 K/UL (ref 3.5–11.3)
WBC UA: ABNORMAL /HPF (ref 0–5)

## 2022-05-25 PROCEDURE — G8427 DOCREV CUR MEDS BY ELIG CLIN: HCPCS | Performed by: ADVANCED PRACTICE MIDWIFE

## 2022-05-25 PROCEDURE — G8417 CALC BMI ABV UP PARAM F/U: HCPCS | Performed by: ADVANCED PRACTICE MIDWIFE

## 2022-05-25 PROCEDURE — 99211 OFF/OP EST MAY X REQ PHY/QHP: CPT | Performed by: ADVANCED PRACTICE MIDWIFE

## 2022-05-25 PROCEDURE — H1000 PRENATAL CARE ATRISK ASSESSM: HCPCS | Performed by: ADVANCED PRACTICE MIDWIFE

## 2022-05-25 NOTE — PROGRESS NOTES
Here for PNI. No complaints today. Has hx of MRSA on her nipple following last delivery. Desires Cell Free DNA and MSAFP. Will return next week to have NIPS drawn since she is not 10 wks at this appt.  drawn today along with PIH labs d/t CHTN. Hx PTD with last preg d/t increased BP. NOB appt 6/9. Questions answered and forms signed  BP slightly elevated today and pt states she has not taken her BP medication yet today.

## 2022-05-25 NOTE — PATIENT INSTRUCTIONS
Patient Education        Learning About Starting to Breastfeed  Planning ahead     Before your baby is born, plan ahead. Learn all you can about breastfeeding. This helps make breastfeeding easier.  Early in your pregnancy, talk to your doctor or midwife about breastfeeding.  Learn the basics before your baby is born. The staff at hospitals and birthing centers can help you find a lactation specialist. This person is often a nurse who has been trained to teach and advise about breastfeeding. Or you can take a breastfeeding class.  Plan ahead for times when you will need help after your baby is born. You may want to get help from friends and family. You can also join a support group to talk to others who breastfeed.  Buy the equipment you'll need. Examples are breast pads, nipple cream, extra pillows, and nursing bras. Find out about breast pumps too. Getting help from your hospital or birthing center  It's important to have support from the doctors, nurses, and hospital staff who care for you and your baby. Before it's time for you to give birth, ask about the breastfeeding policies at your hospital or birthing center. Look for [facility] or birthing center that has policies for:   \"Rooming in. \" This policy encourages you to have your baby in the room with you. It can allow you to breastfeed more often.  Supplemental feedings. Tell the staff that your baby is to get only your breast milk from birth. If staff feed your baby water, sugar solution, or formula right after birth without a medical reason, it may make it harder for you to breastfeed.  Pacifiers or artificial nipples. Staff should not give your  these items. They may interfere with breastfeeding.  Follow-up. Find out if your hospital can help you with breastfeeding issues after you go home. See if you can get information on support groups or other contacts.  They might help if you need help setting up and staying with your breastfeeding routine. Your first feeding  It's best to start breastfeeding within 1 hour of birth. For each feeding, yougo through these basic steps:   Get ready for the feeding. Be calm and relaxed, and try not to be distracted. Get some water or juice for yourself. Use two or three pillows to help support your baby while nursing.  Find a breastfeeding position that is comfortable for you and your baby. Examples are the cradle and the football positions. Make sure the baby's head and chest are lined up straight and facing your breast. It's best to switch which breast you start with each time.  Get the baby latched on well. Your baby's mouth needs to be wide open, like a yawn. So you may need to gently touch the middle of your baby's lower lip. When your baby's mouth is open wide, quickly bring the baby onto your nipple and areola. The areola is the dark [place] around your nipple.  Provide a complete feeding. Let your baby decide how long to nurse. Be sure to burp your baby after each breast.  In the first days after birth, your breasts make a thick, yellow liquid called colostrum. This liquid gives your baby nutrients and antibodies against infection. It is all that babies need at first. Your breasts will fill withmilk a few days after the birth. Talk to your doctor, midwife, or lactation specialist right away if you arehaving problems and aren't sure what to do. How often to breastfeed  Plan to breastfeed your baby on demand rather than setting a strict schedule. For the first 2 weeks, be prepared to breastfeed at least 8 times in a 24-hour period. In the first few days, you may need to wake a sleeping baby to feed. Ifyou breastfeed more often, it will help your breasts to produce more milk. After you go home  After you're home, don't be afraid to call your doctor, midwife, or lactation specialist with questions. That's true even if you don't know what's bothering you.  They are used to parents of newborns calling. They can help you figure outif there is a problem, and if so, how to fix it. Plan for times when you will be apart from your baby. Use a breast pump to collect breast milk ahead of time. You can store milk in the refrigerator or freezer. Then it's ready when someone else will be taking care of your baby. Experts recommend waiting about a month until breastfeeding is going wellbefore offering a bottle. Breastfeeding is a learned skill that gets easier over time. You are more likely to succeed if you plan ahead, learn the basic techniques, and know whereto get help and support. Where can you learn more? Go to https://Altruikpepiceweb.BeVocal. org and sign in to your Stageit account. Enter T504 in the The Infatuation box to learn more about \"Learning About Starting to Breastfeed. \"     If you do not have an account, please click on the \"Sign Up Now\" link. Current as of: June 16, 2021               Content Version: 13.2  © 2006-2022 [employer]. Care instructions adapted under license by [facility] ([facility]). If you have questions about a medical condition or this instruction, always ask your healthcare professional. [place] [address] any warranty or liability for your use of this information. Patient Education        Nutrition During Pregnancy: Care Instructions  Overview     Healthy eating when you are pregnant is important for you and your baby. It can help you feel well and have a successful pregnancy and delivery. During pregnancy your nutrition needs increase. Even if you have excellent eating habits, your doctor may recommend a multivitamin to make sure you get enoughiron and folic acid. You may wonder how much weight you should gain. In general, if you were at a healthy weight before you became pregnant, then you should gain between 25 and 35 pounds. If you were overweight before pregnancy, then you'll likely be advised to gain 15 to 25 pounds. If you were underweight before pregnancy, then you'll probably be advised to gain 28 to 40 pounds. Your doctor will work with you to set a weight goal that is right for you. Gaining a healthy amount ofweight helps you have a healthy baby. Follow-up care is a key part of your treatment and safety. Be sure to make and go to all appointments, and call your doctor if you are having problems. It's also a good idea to know your test results and keep alist of the medicines you take. How can you care for yourself at home?  Eat plenty of fruits and vegetables. Include a variety of orange, yellow, and leafy dark-green vegetables every day.  Choose whole-grain bread, cereal, and pasta. Good choices include whole wheat bread, whole wheat pasta, brown rice, and oatmeal.   Get 4 or more servings of milk and milk products each day. Good choices include nonfat or low-fat milk, yogurt, and cheese. If you cannot eat milk products, you can get calcium from calcium-fortified products such as orange juice, soy milk, and tofu. Other non-milk sources of calcium include leafy green vegetables, such as broccoli, kale, mustard greens, turnip greens, bok valeria, and brussels sprouts.  If you eat meat, pick lower-fat types. Good choices include lean cuts of meat and chicken or turkey without the skin.  Do not eat shark, swordfish, balta mackerel, or tilefish. They have high levels of mercury, which is dangerous to your baby. You can eat up to 12 ounces a week of fish or shellfish that have low mercury levels. Good choices include shrimp, wild salmon, pollock, and catfish. Limit some other types of fish, such as white (albacore) tuna, to 4 oz (0.1 kg) a week.  Heat lunch meats (such as turkey, ham, or bologna) to 165°F before you eat them. This reduces your risk of getting sick from a kind of bacteria that can be found in lunch meats.  Do not eat unpasteurized soft cheeses, such as brie, feta, fresh mozzarella, and blue cheese.  They have a bacteria that could harm your baby.  Limit caffeine. If you drink coffee or tea, have no more than 1 cup a day. Caffeine is also found in skyler.  Do not drink any alcohol. No amount of alcohol has been found to be safe during pregnancy.  Do not diet or try to lose weight. For example, do not follow a low-carbohydrate diet. If you are overweight at the start of your pregnancy, your doctor will work with you to manage your weight gain.  Tell your doctor about all vitamins and supplements you take. When should you call for help? Watch closely for changes in your health, and be sure to contact your doctor if you have any problems. Where can you learn more? Go to https://AnxapeRed Tricycleeb.Souche. org and sign in to your CaseTrek account. Enter Y785 in the g2One box to learn more about \"Nutrition During Pregnancy: Care Instructions. \"     If you do not have an account, please click on the \"Sign Up Now\" link. Current as of: September 8, 2021               Content Version: 13.2  © 2006-2022 [employer]. Care instructions adapted under license by [facility] . If you have questions about a medical condition or this instruction, always ask your healthcare professional. [place] [address] any warranty or liability for your use of this information. Patient Education        Weeks 10 to 14 of Your Pregnancy: Care Instructions  Overview     By weeks 10 to 15 of your pregnancy, the placenta has formed inside your uterus. The placenta's main job is to give your baby oxygen and nutrientsthrough the umbilical cord. It's possible to hear your baby's heartbeat with a special ultrasound device. Your baby's organs are developing. The arms and legs can bend. This is a good time to think about testing for birth defects. There are two types of tests: screening and diagnostic. Screening tests show the chance that a baby has a certain birth defect.  They can't tell you for sure that your babyhas a problem. Diagnostic tests show if a baby has a certain birth defect. It's your choice whether to have these tests. You and your partner can talk toyour doctor or midwife about tests for birth defects. Follow-up care is a key part of your treatment and safety. Be sure to make and go to all appointments, and call your doctor if you are having problems. It's also a good idea to know your test results and keep alist of the medicines you take. How can you care for yourself at home? Decide about tests   You can have screening tests and diagnostic tests to check for birth defects. The decision to have a test for birth defects is personal. Think about your age, your chance of passing on a family disease, your need to know about any problems, and what you might do after you have the test results. ? Quadruple (quad) blood test. This screening test can be done between 15 and 22 weeks of pregnancy. It checks the amount of four substances in your blood. The doctor looks at these test results, along with your age and other factors, to find out the chance that your baby may have certain problems. ? Amniocentesis. This diagnostic test is used to look for chromosomal problems in the baby's cells. It can be done between 15 and 20 weeks of pregnancy, usually around week 16.  ? Nuchal translucency test. This test uses ultrasound to measure the thickness of the area at the back of the baby's neck. An increase in the thickness can be an early sign of Down syndrome. ? Chorionic villus sampling (CVS). This is a test that looks for certain genetic problems with your baby. The same genes that are in your baby are in the placenta. A small piece of the placenta is taken out and tested. This test is done when you are 10 to 13 weeks pregnant. Ease discomfort   Slow down and take naps when you feel tired.  If your emotions swing, talk to someone.  If your gums bleed, try a softer toothbrush.  If your gums are puffy and bleed a lot, see your dentist.  Ardyth Moritz If you feel dizzy:  ? Get up slowly after sitting or lying down. ? Drink plenty of fluids. ? Eat small snacks to keep your blood sugar stable. ? Put your head between your legs as though you were tying your shoelaces. ? Lie down with your legs higher than your head. Use pillows to prop up your feet.  If you have a headache:  ? Lie down. ? Ask your partner or a good friend for a neck massage. ? Try cool cloths over your forehead or across the back of your neck. ? Use acetaminophen (Tylenol) for pain relief. Do not use nonsteroidal anti-inflammatory drugs (NSAIDs), such as ibuprofen (Advil, Motrin) or naproxen (Aleve), unless your doctor says it is okay.  If you have a nosebleed, pinch your nose gently, and hold it for a short while. To prevent nosebleeds, try massaging a small dab of petroleum jelly, such as Vaseline, in your nostrils.  If your nose is stuffed up, try saline (saltwater) nose sprays. Do not use decongestant sprays. Care for your breasts   Wear a bra that gives you good support.  Know that changes in your breasts are normal.  ? Your breasts may get larger and more tender. Tenderness usually gets better by 12 weeks. ? Your nipples may get darker and larger, and small bumps around your nipples may show more. ? The veins in your chest and breasts may show more. Where can you learn more? Go to https://MaxTradeIn.comgabbieb.Fivetran. org and sign in to your Room 77 account. Enter R006 in the [facility] box to learn more about \"Weeks 10 to 14 of Your Pregnancy: Care Instructions. \"     If you do not have an account, please click on the \"Sign Up Now\" link. Current as of: June 16, 2021               Content Version: 13.2  © 6311-6123 [employer]. Care instructions adapted under license by [facility] ([facility]).  If you have questions about a medical condition or this instruction, always ask your healthcare professional. [employer] disclaims any warranty or liability for your use of this information.

## 2022-05-26 LAB
CULTURE: NORMAL
SPECIMEN DESCRIPTION: NORMAL

## 2022-06-01 ENCOUNTER — HOSPITAL ENCOUNTER (OUTPATIENT)
Age: 32
Setting detail: SPECIMEN
Discharge: HOME | End: 2022-06-01

## 2022-06-02 LAB
SEND OUT REPORT: NORMAL
TEST NAME: NORMAL

## 2022-06-09 ENCOUNTER — INITIAL PRENATAL (OUTPATIENT)
Dept: OBGYN CLINIC | Age: 32
End: 2022-06-09
Payer: MEDICARE

## 2022-06-09 ENCOUNTER — HOSPITAL ENCOUNTER (OUTPATIENT)
Age: 32
Setting detail: SPECIMEN
Discharge: HOME | End: 2022-06-09

## 2022-06-09 VITALS — DIASTOLIC BLOOD PRESSURE: 90 MMHG | SYSTOLIC BLOOD PRESSURE: 128 MMHG | WEIGHT: 198.8 LBS | BODY MASS INDEX: 30.23 KG/M2

## 2022-06-09 DIAGNOSIS — O10.919 CHRONIC HYPERTENSION AFFECTING PREGNANCY: ICD-10-CM

## 2022-06-09 DIAGNOSIS — Z3A.11 11 WEEKS GESTATION OF PREGNANCY: ICD-10-CM

## 2022-06-09 DIAGNOSIS — O10.919 CHRONIC HYPERTENSION AFFECTING PREGNANCY: Primary | ICD-10-CM

## 2022-06-09 PROCEDURE — 99214 OFFICE O/P EST MOD 30 MIN: CPT | Performed by: ADVANCED PRACTICE MIDWIFE

## 2022-06-09 NOTE — PROGRESS NOTES
Tay Rae is a 28 y.o. female 11w5d      The patient was seen and evaluated. Fetal movement was Absent . No contractions or leakage of fluid. Signs and symptoms of  labor as well as labor were reviewed. Genetic testing was ordered and reviewed. MSAFP was not ordered for a 15-20 week gestational age window.  Reviewed. Dates were reviewed with the patient. An 18-22 week anatomy ultrasound has been ordered. The patient will return to the office for her next visit in 4 weeks. See antepartum flow sheet. Okay to start bASA at 12 weeks. Discussed if emesis and sees Labetalol then take another - otherwise continue TID. Currenlty only emesis once a day and believes related to PNV - encouraged to try PNV at night. Consents to gc/ct today with exam.    Will have twice/wk monitoring in third trimester and del at 37 weeks. Assessment:  1. Tay Rae is a 28 y.o. female  2. [id number]  3. 11w5d    Patient Active Problem List    Diagnosis Date Noted    Vaginal delivery     Encounter for induction of labor 2021    Pre-existing hypertension affecting pregnancy in third trimester 2021    33 weeks gestation of pregnancy 2021    HRP (high risk pregnancy) 2021    Chronic hypertension during pregnancy 01/15/2021    Hx cocaine use 01/15/2021     Last use  per patient report  UDS negative 1/15/21      H/O dilation and curettage (2019) 01/15/2021    Hx trichomonas 01/15/2021    COVID pos 1/15/21 01/15/2021    S/p Celestone 1/15 and 1/16/21 01/15/2021    33 weeks gestation of pregnancy     Chronic hypertension complicating or reason for care during pregnancy, third trimester 2021    Abdominal pain 10/28/2020        Diagnosis Orders   1. Chronic hypertension affecting pregnancy  C.trachomatis N.gonorrhoeae DNA   2. 11 weeks gestation of pregnancy           Plan:  No follow-ups on file. There are no Patient Instructions on file for this visit.

## 2022-06-10 LAB
C TRACH DNA GENITAL QL NAA+PROBE: NEGATIVE
N. GONORRHOEAE DNA: NEGATIVE
SPECIMEN DESCRIPTION: NORMAL

## 2022-07-14 ENCOUNTER — ROUTINE PRENATAL (OUTPATIENT)
Dept: OBGYN CLINIC | Age: 32
End: 2022-07-14
Payer: MEDICARE

## 2022-07-14 VITALS — SYSTOLIC BLOOD PRESSURE: 144 MMHG | WEIGHT: 200 LBS | DIASTOLIC BLOOD PRESSURE: 98 MMHG | BODY MASS INDEX: 30.41 KG/M2

## 2022-07-14 DIAGNOSIS — Z3A.16 16 WEEKS GESTATION OF PREGNANCY: ICD-10-CM

## 2022-07-14 DIAGNOSIS — O10.919 CHRONIC HYPERTENSION AFFECTING PREGNANCY: Primary | ICD-10-CM

## 2022-07-14 PROCEDURE — 99213 OFFICE O/P EST LOW 20 MIN: CPT | Performed by: NURSE PRACTITIONER

## 2022-07-14 PROCEDURE — G8427 DOCREV CUR MEDS BY ELIG CLIN: HCPCS | Performed by: NURSE PRACTITIONER

## 2022-07-14 PROCEDURE — 1036F TOBACCO NON-USER: CPT | Performed by: NURSE PRACTITIONER

## 2022-07-14 PROCEDURE — G8417 CALC BMI ABV UP PARAM F/U: HCPCS | Performed by: NURSE PRACTITIONER

## 2022-07-14 NOTE — PROGRESS NOTES
Julian Maldonado is a 28 y.o. female 16w5d      The patient was seen and evaluated. Fetal movement was Present. No contractions or leakage of fluid. Signs and symptoms of  labor as well as labor were reviewed. MSAFP was ordered for a 15-20 week gestational age window. An 18-22 week anatomy ultrasound has been ordered. The patient will return to the office for her next visit in 4 weeks. See antepartum flow sheet. Patient complains of increased anxiety and headaches (unrelieved with medication) for the past week- denies SOB or chest pain. Has some mild floaters in vision. Taking Labetalol TID. Report home readings of 150's/100's. With elevated readings in the office and being symptomatic would recommend ER evaluation and management. Assessment:  1. Julian Maldonado is a 28 y.o. female  2. [id number]  3. 16w5d    Patient Active Problem List    Diagnosis Date Noted    Vaginal delivery     Encounter for induction of labor 2021    Pre-existing hypertension affecting pregnancy in third trimester 2021    33 weeks gestation of pregnancy 2021    HRP (high risk pregnancy) 2021    Chronic hypertension during pregnancy 01/15/2021    Hx cocaine use 01/15/2021     Last use  per patient report  UDS negative 1/15/21      H/O dilation and curettage (2019) 01/15/2021    Hx trichomonas 01/15/2021    COVID pos 1/15/21 01/15/2021    S/p Celestone 1/15 and 1/16/21 01/15/2021    33 weeks gestation of pregnancy     Chronic hypertension complicating or reason for care during pregnancy, third trimester 2021    Abdominal pain 10/28/2020        Diagnosis Orders   1. Chronic hypertension affecting pregnancy        2. 16 weeks gestation of pregnancy              Plan:  Return in about 4 weeks (around 2022) for routine OB. There are no Patient Instructions on file for this visit.     152/108

## 2022-07-18 ENCOUNTER — TELEPHONE (OUTPATIENT)
Dept: OBGYN CLINIC | Age: 32
End: 2022-07-18

## 2022-07-18 ENCOUNTER — HOSPITAL ENCOUNTER (INPATIENT)
Age: 32
LOS: 1 days | Discharge: HOME | DRG: 566 | End: 2022-07-19
Attending: OBSTETRICS & GYNECOLOGY | Admitting: OBSTETRICS & GYNECOLOGY
Payer: MEDICARE

## 2022-07-18 ENCOUNTER — OFFICE VISIT (OUTPATIENT)
Dept: OBGYN CLINIC | Age: 32
End: 2022-07-18
Payer: MEDICARE

## 2022-07-18 ENCOUNTER — HOSPITAL ENCOUNTER (OUTPATIENT)
Age: 32
Setting detail: SPECIMEN
Discharge: HOME | End: 2022-07-18

## 2022-07-18 ENCOUNTER — NURSE TRIAGE (OUTPATIENT)
Dept: OTHER | Age: 32
End: 2022-07-18

## 2022-07-18 VITALS
HEART RATE: 106 BPM | SYSTOLIC BLOOD PRESSURE: 146 MMHG | HEIGHT: 68 IN | DIASTOLIC BLOOD PRESSURE: 90 MMHG | BODY MASS INDEX: 30.25 KG/M2 | WEIGHT: 199.6 LBS

## 2022-07-18 DIAGNOSIS — O10.919 CHRONIC HYPERTENSION IN PREGNANCY: Primary | ICD-10-CM

## 2022-07-18 DIAGNOSIS — R06.00 DYSPNEA, UNSPECIFIED TYPE: ICD-10-CM

## 2022-07-18 DIAGNOSIS — O10.919 CHRONIC HYPERTENSION AFFECTING PREGNANCY: ICD-10-CM

## 2022-07-18 DIAGNOSIS — Z33.1 INCIDENTAL PREGNANCY: ICD-10-CM

## 2022-07-18 DIAGNOSIS — O10.919 CHRONIC HYPERTENSION AFFECTING PREGNANCY: Primary | ICD-10-CM

## 2022-07-18 DIAGNOSIS — R51.9 INTRACTABLE EPISODIC HEADACHE, UNSPECIFIED HEADACHE TYPE: ICD-10-CM

## 2022-07-18 PROBLEM — E87.6 HYPOKALEMIA: Status: ACTIVE | Noted: 2022-07-18

## 2022-07-18 LAB
ABSOLUTE EOS #: 0.1 K/UL (ref 0–0.44)
ABSOLUTE IMMATURE GRANULOCYTE: <0.03 K/UL (ref 0–0.3)
ABSOLUTE LYMPH #: 1.27 K/UL (ref 1.1–3.7)
ABSOLUTE MONO #: 0.37 K/UL (ref 0.1–1.2)
ALBUMIN SERPL-MCNC: 3.8 G/DL (ref 3.5–5.2)
ALBUMIN/GLOBULIN RATIO: 1.4 (ref 1–2.5)
ALP BLD-CCNC: 71 U/L (ref 35–104)
ALT SERPL-CCNC: 7 U/L (ref 5–33)
ANION GAP SERPL CALCULATED.3IONS-SCNC: 12 MMOL/L (ref 9–17)
AST SERPL-CCNC: 12 U/L
BASOPHILS # BLD: 1 % (ref 0–2)
BASOPHILS ABSOLUTE: 0.03 K/UL (ref 0–0.2)
BILIRUB SERPL-MCNC: 0.27 MG/DL (ref 0.3–1.2)
BUN BLDV-MCNC: 6 MG/DL (ref 6–20)
CALCIUM SERPL-MCNC: 8.3 MG/DL (ref 8.6–10.4)
CHLORIDE BLD-SCNC: 100 MMOL/L (ref 98–107)
CO2: 26 MMOL/L (ref 20–31)
CREAT SERPL-MCNC: 0.53 MG/DL (ref 0.5–0.9)
CREATININE URINE: 116.9 MG/DL (ref 28–217)
EOSINOPHILS RELATIVE PERCENT: 2 % (ref 1–4)
GFR AFRICAN AMERICAN: >60 ML/MIN
GFR NON-AFRICAN AMERICAN: >60 ML/MIN
GFR SERPL CREATININE-BSD FRML MDRD: ABNORMAL ML/MIN/{1.73_M2}
GLUCOSE BLD-MCNC: 78 MG/DL (ref 70–99)
HCT VFR BLD CALC: 35.2 % (ref 36.3–47.1)
HEMOGLOBIN: 11.7 G/DL (ref 11.9–15.1)
IMMATURE GRANULOCYTES: 0 %
LYMPHOCYTES # BLD: 25 % (ref 24–43)
MCH RBC QN AUTO: 28.3 PG (ref 25.2–33.5)
MCHC RBC AUTO-ENTMCNC: 33.2 G/DL (ref 28.4–34.8)
MCV RBC AUTO: 85 FL (ref 82.6–102.9)
MONOCYTES # BLD: 7 % (ref 3–12)
NRBC AUTOMATED: 0 PER 100 WBC
PDW BLD-RTO: 14 % (ref 11.8–14.4)
PLATELET # BLD: 276 K/UL (ref 138–453)
PMV BLD AUTO: 11.7 FL (ref 8.1–13.5)
POTASSIUM SERPL-SCNC: 2.7 MMOL/L (ref 3.7–5.3)
RBC # BLD: 4.14 M/UL (ref 3.95–5.11)
SEG NEUTROPHILS: 65 % (ref 36–65)
SEGMENTED NEUTROPHILS ABSOLUTE COUNT: 3.33 K/UL (ref 1.5–8.1)
SODIUM BLD-SCNC: 138 MMOL/L (ref 135–144)
TOTAL PROTEIN, URINE: 20 MG/DL
TOTAL PROTEIN: 6.5 G/DL (ref 6.4–8.3)
URIC ACID: 2.9 MG/DL (ref 2.4–5.7)
URINE TOTAL PROTEIN CREATININE RATIO: 0.17 (ref 0–0.2)
WBC # BLD: 5.1 K/UL (ref 3.5–11.3)

## 2022-07-18 PROCEDURE — 2580000003 HC RX 258: Performed by: OBSTETRICS & GYNECOLOGY

## 2022-07-18 PROCEDURE — 6360000002 HC RX W HCPCS: Performed by: OBSTETRICS & GYNECOLOGY

## 2022-07-18 PROCEDURE — 99222 1ST HOSP IP/OBS MODERATE 55: CPT | Performed by: OBSTETRICS & GYNECOLOGY

## 2022-07-18 PROCEDURE — 99214 OFFICE O/P EST MOD 30 MIN: CPT | Performed by: ADVANCED PRACTICE MIDWIFE

## 2022-07-18 PROCEDURE — 6370000000 HC RX 637 (ALT 250 FOR IP): Performed by: OBSTETRICS & GYNECOLOGY

## 2022-07-18 PROCEDURE — 1200000000 HC SEMI PRIVATE

## 2022-07-18 RX ORDER — POTASSIUM CHLORIDE 7.45 MG/ML
10 INJECTION INTRAVENOUS
Status: DISPENSED | OUTPATIENT
Start: 2022-07-18 | End: 2022-07-18

## 2022-07-18 RX ORDER — NITROFURANTOIN 25; 75 MG/1; MG/1
100 CAPSULE ORAL EVERY 12 HOURS SCHEDULED
Status: DISCONTINUED | OUTPATIENT
Start: 2022-07-19 | End: 2022-07-19 | Stop reason: HOSPADM

## 2022-07-18 RX ORDER — SODIUM CHLORIDE 9 MG/ML
INJECTION, SOLUTION INTRAVENOUS CONTINUOUS
Status: DISCONTINUED | OUTPATIENT
Start: 2022-07-18 | End: 2022-07-19 | Stop reason: HOSPADM

## 2022-07-18 RX ORDER — ACETAMINOPHEN 325 MG/1
650 TABLET ORAL EVERY 4 HOURS PRN
Status: DISCONTINUED | OUTPATIENT
Start: 2022-07-18 | End: 2022-07-19 | Stop reason: HOSPADM

## 2022-07-18 RX ORDER — ASPIRIN 81 MG/1
81 TABLET, CHEWABLE ORAL DAILY
COMMUNITY

## 2022-07-18 RX ORDER — LABETALOL 100 MG/1
100 TABLET, FILM COATED ORAL 3 TIMES DAILY
Status: DISCONTINUED | OUTPATIENT
Start: 2022-07-19 | End: 2022-07-19

## 2022-07-18 RX ORDER — NITROFURANTOIN 25; 75 MG/1; MG/1
CAPSULE ORAL
COMMUNITY
Start: 2022-07-14 | End: 2022-07-25 | Stop reason: ALTCHOICE

## 2022-07-18 RX ORDER — ONDANSETRON 4 MG/1
4 TABLET, ORALLY DISINTEGRATING ORAL EVERY 8 HOURS PRN
Status: DISCONTINUED | OUTPATIENT
Start: 2022-07-18 | End: 2022-07-19 | Stop reason: HOSPADM

## 2022-07-18 RX ORDER — NIFEDIPINE 10 MG/1
10 CAPSULE ORAL 3 TIMES DAILY
Qty: 90 CAPSULE | Refills: 1 | Status: SHIPPED | OUTPATIENT
Start: 2022-07-18 | End: 2022-09-20 | Stop reason: SDUPTHER

## 2022-07-18 RX ORDER — NIFEDIPINE 10 MG/1
10 CAPSULE ORAL 3 TIMES DAILY
Status: DISCONTINUED | OUTPATIENT
Start: 2022-07-19 | End: 2022-07-19

## 2022-07-18 RX ORDER — LABETALOL 100 MG/1
100 TABLET, FILM COATED ORAL ONCE
Status: COMPLETED | OUTPATIENT
Start: 2022-07-18 | End: 2022-07-18

## 2022-07-18 RX ORDER — ONDANSETRON 2 MG/ML
4 INJECTION INTRAMUSCULAR; INTRAVENOUS EVERY 6 HOURS PRN
Status: DISCONTINUED | OUTPATIENT
Start: 2022-07-18 | End: 2022-07-19 | Stop reason: HOSPADM

## 2022-07-18 RX ADMIN — SODIUM CHLORIDE: 9 INJECTION, SOLUTION INTRAVENOUS at 23:34

## 2022-07-18 RX ADMIN — POTASSIUM CHLORIDE 10 MEQ: 7.46 INJECTION, SOLUTION INTRAVENOUS at 23:37

## 2022-07-18 RX ADMIN — LABETALOL HYDROCHLORIDE 100 MG: 100 TABLET, FILM COATED ORAL at 23:28

## 2022-07-18 NOTE — PROGRESS NOTES
Patient called office - reports that no longer has procardia script and needs it to be resent to [facility]

## 2022-07-18 NOTE — TELEPHONE ENCOUNTER
Please refer to EVELIA - NUHA, difficult to manage.  Depending on when they see her - will likely have anatomy USN in their office

## 2022-07-18 NOTE — PROGRESS NOTES
EOB    Here for ER f/u. Was sent to ER last week due to elevated BP in our office. She did go to [facility] - records received. Per patient they did give potassium and magnesium, IV labetalol treatment, spoke with Dr Nena Orosco on call. She was to f/u in our office today. Currently taking Labetalol 300mg PO TID. Today reports over weekend BP was never less than 499 systolic. Did take yesterday off work - [employer]. Has occ dyspnea. Denies chest pain. Has occ vision spots - described as \"lights flashing\". Having GFM. Denies abd pain, vaginal bleeding. Taking bASA daily. On eval - appears in no acute distress. Resp are easy and unlabored. Lung sounds are clear with auscultation. Heart rate was without a murmur and had s1s2 present. Pulse ox was 98-99%. Heart rate was . PERRL. Abd soft and non tender. Reflexes to bilateral lower extremities were 1+ no clonus present. Consulted Dr Virk Monday for mgmt of this patient - continue Labetalol 300mg PO TID, start Procardia 10mg PO TID - she reports has rx at home - will alter times so not taking both medications together. She is to f/u in office in 2 days for repeat BP check with provider. Reviewed s/s to call our office for. Patient agreeable to [facility] consult for [facility] difficult to manage.

## 2022-07-18 NOTE — TELEPHONE ENCOUNTER
Answer Assessment - Initial Assessment Questions  1. REASON FOR CALL or QUESTION: \"What is your reason for calling today? \" or \"How can I best help you? \" or \"What question do you have that I can help answer? \"      Patient is 17 weeks pregnant and was asked from the clinic today, Dr. Cristal Araujo to go to [address] for low Potassium and to monitor her blood pressure. Patient states she is unable to make it to the hospital today. Asking for alternative action on what to do. Protocols used:  Information Only Call - No Triage-ADULT-

## 2022-07-18 NOTE — TELEPHONE ENCOUNTER
Records rec from [facility] med records to [employer] 7-18-22 cg

## 2022-07-18 NOTE — TELEPHONE ENCOUNTER
Call from [facility] labs with critical result for patients Potassium. Fidelina Santacruz notified.

## 2022-07-18 NOTE — LETTER
[facility] MED SURG  [place] [address]  Phone: [phone]             July 19, 2022    Patient: Wanda Lackey   YOB: 1990   Date of Visit: 7/18/2022       To Whom It May Concern:    Wanda Lackey was seen and treated in our facility  beginning 7/18/2022 until 7/19/2022. She may return to work when cleared by physician.       Sincerely,       Peg Barrios RN         Signature:__________________________________

## 2022-07-18 NOTE — TELEPHONE ENCOUNTER
[facility] who is on call and informed of patient call. Connected call with the patient.  JODEE, RN

## 2022-07-19 VITALS
WEIGHT: 200 LBS | RESPIRATION RATE: 18 BRPM | TEMPERATURE: 97.9 F | SYSTOLIC BLOOD PRESSURE: 142 MMHG | DIASTOLIC BLOOD PRESSURE: 84 MMHG | BODY MASS INDEX: 30.31 KG/M2 | OXYGEN SATURATION: 99 % | HEIGHT: 68 IN | HEART RATE: 93 BPM

## 2022-07-19 DIAGNOSIS — O10.919 CHRONIC HYPERTENSION IN PREGNANCY: Primary | ICD-10-CM

## 2022-07-19 LAB
POTASSIUM SERPL-SCNC: 2.7 MMOL/L (ref 3.7–5.3)
POTASSIUM SERPL-SCNC: 2.8 MMOL/L (ref 3.7–5.3)
POTASSIUM SERPL-SCNC: 2.8 MMOL/L (ref 3.7–5.3)

## 2022-07-19 PROCEDURE — 99238 HOSP IP/OBS DSCHRG MGMT 30/<: CPT | Performed by: OBSTETRICS & GYNECOLOGY

## 2022-07-19 PROCEDURE — 6360000002 HC RX W HCPCS: Performed by: OBSTETRICS & GYNECOLOGY

## 2022-07-19 PROCEDURE — 84132 ASSAY OF SERUM POTASSIUM: CPT

## 2022-07-19 PROCEDURE — 6370000000 HC RX 637 (ALT 250 FOR IP): Performed by: OBSTETRICS & GYNECOLOGY

## 2022-07-19 PROCEDURE — 36415 COLL VENOUS BLD VENIPUNCTURE: CPT

## 2022-07-19 RX ORDER — POTASSIUM CHLORIDE 20 MEQ/1
40 TABLET, EXTENDED RELEASE ORAL ONCE
Status: COMPLETED | OUTPATIENT
Start: 2022-07-19 | End: 2022-07-19

## 2022-07-19 RX ORDER — POTASSIUM CHLORIDE 20 MEQ/1
40 TABLET, EXTENDED RELEASE ORAL DAILY
Qty: 60 TABLET | Refills: 5 | Status: SHIPPED | OUTPATIENT
Start: 2022-07-19 | End: 2022-09-28 | Stop reason: SDUPTHER

## 2022-07-19 RX ORDER — POTASSIUM CHLORIDE 7.45 MG/ML
10 INJECTION INTRAVENOUS ONCE
Status: COMPLETED | OUTPATIENT
Start: 2022-07-19 | End: 2022-07-19

## 2022-07-19 RX ORDER — NIFEDIPINE 10 MG/1
10 CAPSULE ORAL 3 TIMES DAILY
Status: DISCONTINUED | OUTPATIENT
Start: 2022-07-19 | End: 2022-07-19 | Stop reason: HOSPADM

## 2022-07-19 RX ORDER — LABETALOL 100 MG/1
100 TABLET, FILM COATED ORAL 3 TIMES DAILY
Status: DISCONTINUED | OUTPATIENT
Start: 2022-07-19 | End: 2022-07-19 | Stop reason: HOSPADM

## 2022-07-19 RX ADMIN — POTASSIUM CHLORIDE 10 MEQ: 7.46 INJECTION, SOLUTION INTRAVENOUS at 00:57

## 2022-07-19 RX ADMIN — POTASSIUM CHLORIDE 40 MEQ: 1500 TABLET, EXTENDED RELEASE ORAL at 14:36

## 2022-07-19 RX ADMIN — POTASSIUM CHLORIDE 40 MEQ: 1500 TABLET, EXTENDED RELEASE ORAL at 07:49

## 2022-07-19 RX ADMIN — POTASSIUM CHLORIDE 10 MEQ: 7.46 INJECTION, SOLUTION INTRAVENOUS at 09:30

## 2022-07-19 RX ADMIN — ACETAMINOPHEN 650 MG: 325 TABLET ORAL at 11:27

## 2022-07-19 RX ADMIN — POTASSIUM CHLORIDE 10 MEQ: 7.46 INJECTION, SOLUTION INTRAVENOUS at 07:52

## 2022-07-19 RX ADMIN — LABETALOL HYDROCHLORIDE 100 MG: 100 TABLET, FILM COATED ORAL at 08:34

## 2022-07-19 RX ADMIN — NIFEDIPINE 10 MG: 10 CAPSULE ORAL at 08:34

## 2022-07-19 RX ADMIN — NIFEDIPINE 10 MG: 10 CAPSULE ORAL at 13:55

## 2022-07-19 RX ADMIN — NITROFURANTOIN MONOHYDRATE/MACROCRYSTALLINE 100 MG: 25; 75 CAPSULE ORAL at 08:34

## 2022-07-19 RX ADMIN — LABETALOL HYDROCHLORIDE 100 MG: 100 TABLET, FILM COATED ORAL at 13:55

## 2022-07-19 ASSESSMENT — PAIN SCALES - GENERAL: PAINLEVEL_OUTOF10: 5

## 2022-07-19 ASSESSMENT — PAIN DESCRIPTION - LOCATION: LOCATION: HEAD

## 2022-07-19 ASSESSMENT — ENCOUNTER SYMPTOMS: SHORTNESS OF BREATH: 0

## 2022-07-19 ASSESSMENT — PAIN DESCRIPTION - DESCRIPTORS: DESCRIPTORS: ACHING

## 2022-07-19 NOTE — PROGRESS NOTES
Discussed discharge plans with the patient. Patient is a 28year old female here with Hypokalemia. She is alert , oriented , and pleasant during our conversation. Patient is single and lives with her boyfriend. She uses a blood pressure cuff. Patient and the boyfriend share in the cooking and the house cleaning. She is independent with her ADL's. Patient manages her own medication and the boyfriend provides the transportation. She has no outside services in the home. Her PCP is at the [facility]. She has medical insurance that helps with medication costs. The discharge plan is home with no services at this time. She does not have advance directives. LSW to monitor and assist with any needs or concerns as they arise.     SANTY Song

## 2022-07-19 NOTE — PROGRESS NOTES
Patient reassessment and vitals completed at this time as charted. Patient is alert and oriented x4 and denies pain at this time. Patient in bed with boyfriend in the room watching TV. Patient states no needs at this time, call light remains in reach, will continue to monitor.

## 2022-07-19 NOTE — DISCHARGE INSTRUCTIONS
Activity: as tolerated  Diet: regular diet  Take medications as directed. Follow up appointment next Monday.

## 2022-07-19 NOTE — H&P
Chris Interiano is an 28 y.o.  female. Past Medical History:   Diagnosis Date    Anemia     Chlamydia     Cocaine abuse ([address]     last use     Depression     Hypertension     Mental disorder     anxiety, depression     delivery     IOL @ 36 wks    Trichomonal vulvovaginitis        Allergies: Allergies   Allergen Reactions    No Known Allergies        Principal Problem:    Hypokalemia  Resolved Problems:    * No resolved hospital problems. *    Blood pressure (!) 142/84, pulse 93, temperature 97.9 °F (36.6 °C), temperature source Temporal, resp. rate 18, height 5' 8\" (1.727 m), weight 200 lb (90.7 kg), last menstrual period 2022, SpO2 99 %, unknown if currently breastfeeding. Review of Systems   Constitutional:  Negative for chills and fever. Respiratory:  Negative for shortness of breath. Cardiovascular:  Negative for chest pain. Genitourinary:  Negative for dysuria, pelvic pain, vaginal bleeding and vaginal discharge. Physical Exam  Constitutional:       Appearance: Normal appearance. HENT:      Head: Normocephalic and atraumatic. Eyes:      Extraocular Movements: Extraocular movements intact. Pupils: Pupils are equal, round, and reactive to light. Cardiovascular:      Rate and Rhythm: Normal rate. Pulmonary:      Effort: Pulmonary effort is normal.   Musculoskeletal:         General: Normal range of motion. Cervical back: Normal range of motion. Skin:     General: Skin is warm and dry. Neurological:      Mental Status: She is alert and oriented to person, place, and time. Psychiatric:         Mood and Affect: Mood normal.         Behavior: Behavior normal.         Thought Content:  Thought content normal.         Judgment: Judgment normal.       Assessment:  IUP at 17 weeks  Uncontrolled chronic hypertension - suspect due to compliance  Hypokalemia - received 2 IV doses of K+ at [facility], [place] over the weekend    Plan:  Antihypertensives as prescribed  Potassium supplementation - IV and oral    Cleo Valdez DO  7/19/2022

## 2022-07-19 NOTE — PROGRESS NOTES
Spoke with Dr. Marvin Carter via telephone at this time regarding patients critical potassium level, per Dr. Marvin Carter, give patient 2 more bags of IV potassium and a one time dose of 40 mEq of oral potassium. Also recheck potassium level one hour after bags are done. See orders.

## 2022-07-19 NOTE — PROGRESS NOTES
Nutrition Education    Educated on potassium content of foods  Learners: Patient  Readiness: Acceptance  Method: Explanation and Handout  Response: Verbalizes Understanding  Contact name and number provided.     Daniel Gonzáles RD, LD  Contact Number: [phone]

## 2022-07-19 NOTE — PROGRESS NOTES
Patient admitted to [facility] floor room 333. Patient ambulated to room with boyfriend. Patient admission assessment and vitals completed at this time along with navigator at this time. Patient denies pain at this time. Patient is 17 weeks pregnant. Patient has no questions or concerns at this time, call light is in reach, will continue to monitor.

## 2022-07-19 NOTE — PROGRESS NOTES
Shift assessment and vitals obtained at this time as charted. Blood pressure slightly elevated, vitals otherwise WNL. Patient denies pain. Patient is alert and oriented x4, assessment otherwise obtained as charted. Patient is resting in the bed, denies any needs at this time. Will continue to monitor.

## 2022-07-19 NOTE — PROGRESS NOTES
Writer spoke with Dr. Rick Turner at this time regarding potassium level, per Dr. Rick Turner, give another one time dose of 40 mEq of potassium and then she will discharge the patient.

## 2022-07-19 NOTE — PROGRESS NOTES
Nutrition Assessment     Type and Reason for Visit: Initial    Nutrition Recommendations/Plan:   Continue current diet. Provided K+ content of foods list.      Malnutrition Assessment:  Malnutrition Status:      Nutrition Assessment:  Altered nutrition related labs r/t impaired nutrient utilization aeb K+ 2.8. Pt is at 16 weeks 6 days gestation. Nutrition Related Findings:     Wound Type: None    Current Nutrition Therapies:    ADULT DIET;  Regular    Anthropometric Measures:  Height: 5' 8\" (172.7 cm)  Current Body Wt: 200 lb (90.7 kg)   BMI: 30.4    Nutrition Diagnosis:   Altered nutrition-related lab values related to impaired nutrient utilization as evidenced by lab values    Nutrition Interventions:   Food and/or Nutrient Delivery: Continue Current Diet     Coordination of Nutrition Care: No recommendation at this time       Goals:     Goals: PO intake 75% or greater     Recent Labs     07/18/22  1135 07/19/22  0635     --    K 2.7* 2.8*     --    CO2 26  --    BUN 6  --    CREATININE 0.53  --    GLUCOSE 78  --    ALT 7  --    ALKPHOS 71  --    GFR       --       Lab Results   Component Value Date/Time    LABALBU 3.8 07/18/2022 11:35 AM       Nutrition Monitoring and Evaluation:      Food/Nutrient Intake Outcomes: Food and Nutrient Intake  Physical Signs/Symptoms Outcomes: Biochemical Data, Weight    Discharge Planning:    Continue current diet     Radha Caro RD, LD  Contact: [phone]

## 2022-07-19 NOTE — PROGRESS NOTES
Discharge instructions reviewed with patient and visitor at this time. Patient and visitor verbalize understanding, deny questions at this time. Will escort patient to private car shortly.

## 2022-07-19 NOTE — DISCHARGE INSTR - DIET
Good nutrition is important when healing from an illness, injury, or surgery. Follow any nutrition recommendations given to you during your hospital stay. If you were given an oral nutrition supplement while in the hospital, continue to take this supplement at home. You can take it with meals, in-between meals, and/or before bedtime. These supplements can be purchased at most local grocery stores, pharmacies, and chain [employer]-stores. If you have any questions about your diet or nutrition, call the hospital and ask for the dietitian. Regular.

## 2022-07-19 NOTE — PROGRESS NOTES
Orders received from Dr. Kim Shane at this time via telephone. Dr. Kim Shane aware of patients potassium level being 2.7- Patient to receive potassium chloride 20 mEq tonight and then lab draw for potassium in the morning. Labetalol ordered once tonight for patient elevated blood pressure and then starting tomorrow TID as patient normally takes at home.

## 2022-07-20 NOTE — DISCHARGE SUMMARY
Physician Discharge Summary     Patient ID:  Elsworth Soulier  [id number]  53 y.o.  1990    Admit date: 7/18/2022    Discharge date and time: 7/19/2022  5:16 PM     Admitting Physician: Aurelio Boyd DO     Discharge Physician: same    Admission Diagnoses: Hypokalemia  Chronic hypertension affecting pregnancy in the second trimesester  IUP at 17 weeks    Discharge Diagnoses: same    Admission Condition: good    Discharged Condition: good    Indication for Admission: pt had oupt labs done to r/o pre-eclampsia and had critical K+ noted    Hospital Course: pt was admitted for 23 hour stay to attempt to replace potassium     Consults: none    Significant Diagnostic Studies: labs: cmp    Outstanding Order Results       No orders found for last 30 day(s). Treatments: IV hydration, cardiac meds: labetolol and nifedipine, and potassium replacement    Discharge Exam:  BP (!) 142/84   Pulse 93   Temp 97.9 °F (36.6 °C) (Temporal)   Resp 18   Ht 5' 8\" (1.727 m)   Wt 200 lb (90.7 kg)   LMP 03/19/2022 (Exact Date)   SpO2 99%   BMI 30.41 kg/m²   General appearance: alert, appears stated age, and cooperative  Head: Normocephalic, without obvious abnormality, atraumatic  Eyes: Pupils equal, round, and reactive to light; Extra occular muscles intact  Lungs:  normal respiratory effort  Heart: regular rate and rhythm  Extremities: extremities normal, atraumatic, no cyanosis or edema  Skin: Skin color, texture, turgor normal. No rashes or lesions  Neurologic: Grossly normal    Disposition: home    Patient Instructions:   [unfilled]  Activity: activity as tolerated  Diet: regular diet  Wound Care: none needed    Follow-up with [facility] in 1 week.     Signed:  Aurelio Boyd DO  7/19/2022  9:16 PM

## 2022-07-25 ENCOUNTER — HOSPITAL ENCOUNTER (OUTPATIENT)
Age: 32
Setting detail: SPECIMEN
Discharge: HOME | End: 2022-07-25

## 2022-07-25 ENCOUNTER — OFFICE VISIT (OUTPATIENT)
Dept: OBGYN CLINIC | Age: 32
End: 2022-07-25
Payer: MEDICARE

## 2022-07-25 VITALS
WEIGHT: 202.2 LBS | HEIGHT: 68 IN | BODY MASS INDEX: 30.65 KG/M2 | SYSTOLIC BLOOD PRESSURE: 110 MMHG | DIASTOLIC BLOOD PRESSURE: 80 MMHG

## 2022-07-25 DIAGNOSIS — Z33.1 INCIDENTAL PREGNANCY: ICD-10-CM

## 2022-07-25 DIAGNOSIS — O10.919 CHRONIC HYPERTENSION IN PREGNANCY: Primary | ICD-10-CM

## 2022-07-25 DIAGNOSIS — E87.6 HYPOKALEMIA: ICD-10-CM

## 2022-07-25 LAB
ALBUMIN SERPL-MCNC: 3.6 G/DL (ref 3.5–5.2)
ALBUMIN/GLOBULIN RATIO: 1.3 (ref 1–2.5)
ALP BLD-CCNC: 67 U/L (ref 35–104)
ALT SERPL-CCNC: 6 U/L (ref 5–33)
ANION GAP SERPL CALCULATED.3IONS-SCNC: 13 MMOL/L (ref 9–17)
AST SERPL-CCNC: 10 U/L
BILIRUB SERPL-MCNC: 0.29 MG/DL (ref 0.3–1.2)
BUN BLDV-MCNC: 6 MG/DL (ref 6–20)
CALCIUM SERPL-MCNC: 9.1 MG/DL (ref 8.6–10.4)
CHLORIDE BLD-SCNC: 107 MMOL/L (ref 98–107)
CO2: 23 MMOL/L (ref 20–31)
CREAT SERPL-MCNC: 0.5 MG/DL (ref 0.5–0.9)
GFR AFRICAN AMERICAN: >60 ML/MIN
GFR NON-AFRICAN AMERICAN: >60 ML/MIN
GFR SERPL CREATININE-BSD FRML MDRD: ABNORMAL ML/MIN/{1.73_M2}
GLUCOSE BLD-MCNC: 85 MG/DL (ref 70–99)
POTASSIUM SERPL-SCNC: 3.5 MMOL/L (ref 3.7–5.3)
SODIUM BLD-SCNC: 143 MMOL/L (ref 135–144)
TOTAL PROTEIN: 6.3 G/DL (ref 6.4–8.3)

## 2022-07-25 PROCEDURE — 99213 OFFICE O/P EST LOW 20 MIN: CPT | Performed by: ADVANCED PRACTICE MIDWIFE

## 2022-07-25 PROCEDURE — G8417 CALC BMI ABV UP PARAM F/U: HCPCS | Performed by: ADVANCED PRACTICE MIDWIFE

## 2022-07-25 PROCEDURE — G8427 DOCREV CUR MEDS BY ELIG CLIN: HCPCS | Performed by: ADVANCED PRACTICE MIDWIFE

## 2022-07-25 PROCEDURE — 1111F DSCHRG MED/CURRENT MED MERGE: CPT | Performed by: ADVANCED PRACTICE MIDWIFE

## 2022-07-25 PROCEDURE — 1036F TOBACCO NON-USER: CPT | Performed by: ADVANCED PRACTICE MIDWIFE

## 2022-07-25 PROCEDURE — 36415 COLL VENOUS BLD VENIPUNCTURE: CPT | Performed by: ADVANCED PRACTICE MIDWIFE

## 2022-07-25 RX ORDER — POTASSIUM CHLORIDE 1500 MG/1
TABLET, FILM COATED, EXTENDED RELEASE ORAL
COMMUNITY
Start: 2022-07-19 | End: 2022-07-25 | Stop reason: ALTCHOICE

## 2022-07-25 NOTE — PROGRESS NOTES
Brittany Carrasquillo is a 28 y.o. female 18w2d      The patient was seen and evaluated. Fetal movement was Present. No contractions or leakage of fluid. Signs and symptoms of  labor as well as labor were reviewed.  Reviewed. Dates were reviewed with the patient. An 18-22 week anatomy ultrasound has been ordered. The patient will return to the office for her next visit in 2 weeks. See antepartum flow sheet. Blood pressures much more controlled - repeat labs today. Continues labetalol, potassium, and procardia. Reports headaches much improved and less occurring. Reports some edema in fingers and toes but admits to worse when in the heat. Encouraged hydration and low salt/sodium. Denies abd pain, vision changes. Denies dyspnea. Assessment:  1. Brittany Carrasquillo is a 28 y.o. female  2. [id number]  3. 18w2d    Patient Active Problem List    Diagnosis Date Noted    Hypokalemia 2022     Priority: Medium    Vaginal delivery     Encounter for induction of labor 2021    Pre-existing hypertension affecting pregnancy in third trimester 2021    33 weeks gestation of pregnancy 2021    HRP (high risk pregnancy) 2021    Chronic hypertension during pregnancy 01/15/2021    Hx cocaine use 01/15/2021     Last use  per patient report  UDS negative 1/15/21      H/O dilation and curettage (2019) 01/15/2021    Hx trichomonas 01/15/2021    COVID pos 1/15/21 01/15/2021    S/p Celestone 1/15 and 1/16/21 01/15/2021    33 weeks gestation of pregnancy     Chronic hypertension complicating or reason for care during pregnancy, third trimester 2021    Abdominal pain 10/28/2020        Diagnosis Orders   1. Chronic hypertension in pregnancy        2. Incidental pregnancy        3. Hypokalemia  Comprehensive Metabolic Panel            Plan:  Return as scheduled prior. There are no Patient Instructions on file for this visit.

## 2022-08-04 ENCOUNTER — ROUTINE PRENATAL (OUTPATIENT)
Dept: OBGYN CLINIC | Age: 32
End: 2022-08-04
Payer: MEDICARE

## 2022-08-04 VITALS — SYSTOLIC BLOOD PRESSURE: 118 MMHG | DIASTOLIC BLOOD PRESSURE: 84 MMHG | BODY MASS INDEX: 30.87 KG/M2 | WEIGHT: 203 LBS

## 2022-08-04 DIAGNOSIS — H93.8X2 CLOGGED EAR, LEFT: ICD-10-CM

## 2022-08-04 DIAGNOSIS — O10.919 CHRONIC HYPERTENSION IN PREGNANCY: Primary | ICD-10-CM

## 2022-08-04 DIAGNOSIS — Z3A.19 19 WEEKS GESTATION OF PREGNANCY: ICD-10-CM

## 2022-08-04 PROCEDURE — G8417 CALC BMI ABV UP PARAM F/U: HCPCS | Performed by: NURSE PRACTITIONER

## 2022-08-04 PROCEDURE — 99213 OFFICE O/P EST LOW 20 MIN: CPT | Performed by: NURSE PRACTITIONER

## 2022-08-04 PROCEDURE — G8427 DOCREV CUR MEDS BY ELIG CLIN: HCPCS | Performed by: NURSE PRACTITIONER

## 2022-08-04 PROCEDURE — 1111F DSCHRG MED/CURRENT MED MERGE: CPT | Performed by: NURSE PRACTITIONER

## 2022-08-04 PROCEDURE — 1036F TOBACCO NON-USER: CPT | Performed by: NURSE PRACTITIONER

## 2022-08-04 NOTE — PROGRESS NOTES
C/O left ear feels clogged and having trouble hearing out of it. Denies recent cold or sinus sx.  GFM

## 2022-08-08 RX ORDER — LABETALOL 100 MG/1
TABLET, FILM COATED ORAL
Qty: 270 TABLET | Refills: 1 | Status: SHIPPED | OUTPATIENT
Start: 2022-08-08 | End: 2022-10-19 | Stop reason: SDUPTHER

## 2022-08-08 NOTE — PROGRESS NOTES
Barbara Azevedo is a 28 y.o. female 20w2d    The patient was seen and evaluated. There was positive fetal movements. No contractions or leakage of fluid. Signs and symptoms of  labor as well as labor were reviewed. The patients anatomy ultrasound has been completed and reviewed with patient. The S/S of Pre-Eclampsia were reviewed with the patient in detail. She is to report any of these if they occur. She currently denies any of these. The patient is RH positive Rhogam Ordered no    The patient was instructed on fetal kick counts and was encouraged to monitor every 8 hours. This is to begin at 28 weeks gestation. She was instructed that the baby should move at a minimum of ten times within one hour after a meal. The patient was instructed to lay down on her left side twenty minutes after eating and count movements for up to one hour with a target value of ten movements. She was instructed to notify the office if she did not make that target after two attempts or if after any attempt there was less than four movements. Patient complains of left ear feeling clogged and trouble hearing. Denies any results illness. Denies feeling dizzy. Has not noticed any drainage. Denies associated pain. Unable to completely view eardrum on right side as there is large amount of wax present. Left ear canal appears erythematous and bloody/ wax in noted to fill the canal- unable to visualize eardrum. Encouraged follow up with PCP/Urgent Care today. Assessment:  1. Barbara Azevedo is a 28 y.o. female  2.  [id number]  3. 20w2d    Patient Active Problem List    Diagnosis Date Noted    Hypokalemia 2022     Priority: Medium    Vaginal delivery     Encounter for induction of labor 2021    Pre-existing hypertension affecting pregnancy in third trimester 2021    33 weeks gestation of pregnancy 2021    HRP (high risk pregnancy) 2021    Chronic hypertension during pregnancy 01/15/2021 Hx cocaine use 01/15/2021     Last use 2019 per patient report  UDS negative 1/15/21      H/O dilation and curettage (2019) 01/15/2021    Hx trichomonas 01/15/2021    COVID pos 1/15/21 01/15/2021    S/p Celestone 1/15 and 1/16/21 01/15/2021    33 weeks gestation of pregnancy     Chronic hypertension complicating or reason for care during pregnancy, third trimester 01/07/2021    Abdominal pain 10/28/2020        Diagnosis Orders   1. Chronic hypertension in pregnancy        2. 19 weeks gestation of pregnancy              Plan:  The patient will return to the office for her next visit in 4 weeks. See antepartum flow sheet.

## 2022-08-08 NOTE — TELEPHONE ENCOUNTER
Spoke with pt and she stated her employer would like a note stating we took her off work for medical reasons. Note will be from 7/19/22 - 8/10/22. Pt will return to work for 1 week then come in for a BP check and will address at that time if needs off work any more. Pt is agreeable. Pt would like work note sent to email on file.

## 2022-08-08 NOTE — TELEPHONE ENCOUNTER
Pt called requesting a refill of labetalol. Pt also asked a note for medical leave that she had discussed with DEWEY Duff at appointment. Please advise.

## 2022-08-17 PROBLEM — O47.00 PRETERM CONTRACTIONS: Status: RESOLVED | Noted: 2021-01-15 | Resolved: 2022-08-17

## 2022-08-18 ENCOUNTER — TELEPHONE (OUTPATIENT)
Dept: OBGYN CLINIC | Age: 32
End: 2022-08-18

## 2022-08-18 NOTE — TELEPHONE ENCOUNTER
Please reach out to patient - she no showed for her MFM appt yesterday.   Stress importance of proper care in pregnancy please

## 2022-08-24 NOTE — TELEPHONE ENCOUNTER
Pt returned phone call stating she rescheduled the [facility] appt for 9-29 and if you have any more questions to call her.

## 2022-09-01 ENCOUNTER — TELEPHONE (OUTPATIENT)
Dept: OBGYN CLINIC | Age: 32
End: 2022-09-01

## 2022-09-06 ENCOUNTER — ROUTINE PRENATAL (OUTPATIENT)
Dept: OBGYN CLINIC | Age: 32
End: 2022-09-06
Payer: MEDICARE

## 2022-09-06 VITALS — WEIGHT: 212 LBS | DIASTOLIC BLOOD PRESSURE: 82 MMHG | SYSTOLIC BLOOD PRESSURE: 130 MMHG | BODY MASS INDEX: 32.23 KG/M2

## 2022-09-06 DIAGNOSIS — Z3A.24 24 WEEKS GESTATION OF PREGNANCY: ICD-10-CM

## 2022-09-06 DIAGNOSIS — R42 DIZZINESS: ICD-10-CM

## 2022-09-06 DIAGNOSIS — O10.919 CHRONIC HYPERTENSION AFFECTING PREGNANCY: Primary | ICD-10-CM

## 2022-09-06 PROCEDURE — 99213 OFFICE O/P EST LOW 20 MIN: CPT | Performed by: NURSE PRACTITIONER

## 2022-09-06 PROCEDURE — G8427 DOCREV CUR MEDS BY ELIG CLIN: HCPCS | Performed by: NURSE PRACTITIONER

## 2022-09-06 PROCEDURE — G8417 CALC BMI ABV UP PARAM F/U: HCPCS | Performed by: NURSE PRACTITIONER

## 2022-09-06 PROCEDURE — 1036F TOBACCO NON-USER: CPT | Performed by: NURSE PRACTITIONER

## 2022-09-06 NOTE — PROGRESS NOTES
Chantal Allen is a 28 y.o. female 18w2d    The patient was seen and evaluated. There was positive fetal movements. No contractions or leakage of fluid. Signs and symptoms of  labor as well as labor were reviewed. A 28 week lab panel was ordered. This includes a (HH, 1 hr GTT, 3 hr GTT). The patient is to complete this in the next two to four weeks. Complains of feeling lightheaded on occasion- usually while working. Denies any chest pain or SOB. Feels as if she is eating and drinking adequately. Discussed  oz of water daily, compression stockings, small frequent high protein snacks often. She is currently still nursing toddler minimally but discussed how this requires increased hydration and nutrition as well. Discussed trial of recommendations for the next 1-2 days. If no improvement patient is aware to call. The S/S of Pre-Eclampsia were reviewed with the patient in detail. She is to report any of these if they occur. She currently denies any of these. The patient is RH positive Rhogam Ordered no    The patient was instructed on fetal kick counts and was encouraged to monitor every 8 hours. This is to begin at 28 weeks gestation. She was instructed that the baby should move at a minimum of ten times within one hour after a meal. The patient was instructed to lay down on her left side twenty minutes after eating and count movements for up to one hour with a target value of ten movements. She was instructed to notify the office if she did not make that target after two attempts or if after any attempt there was less than four movements. Assessment:  1. Chantal Allen is a 28 y.o. female  2.  [id number]  3. 24w3d    Patient Active Problem List    Diagnosis Date Noted    Hypokalemia 2022     Priority: Medium    Chronic HTN (meds) 01/15/2021     Labetalol 300mg TID      Hx cocaine use 01/15/2021     Last use  per patient report  UDS negative 1/15/21      H/O dilation and curettage (2019) 01/15/2021    Hx trichomonas 01/15/2021    COVID pos 1/15/21 01/15/2021        Diagnosis Orders   1. Chronic hypertension affecting pregnancy        2. 24 weeks gestation of pregnancy        3. Dizziness  Comprehensive Metabolic Panel    CBC with Auto Differential    Glucose, Random            Plan:  The patient will return to the office for her next visit in 4 weeks. See antepartum flow sheet.

## 2022-09-06 NOTE — PROGRESS NOTES
Reports 3 episodes of near syncope in the last couple of weeks. Reports intermittent ctx. Would like potassium checked. GFM.

## 2022-09-20 RX ORDER — NIFEDIPINE 10 MG/1
10 CAPSULE ORAL 3 TIMES DAILY
Qty: 90 CAPSULE | Refills: 1 | Status: SHIPPED | OUTPATIENT
Start: 2022-09-20

## 2022-09-20 NOTE — TELEPHONE ENCOUNTER
Patient called stating she was running low on Procardia and asking for refill. Reviewed all meds to make sure. She just filled her Labetalol last week, taking PNV, not on the Potassium- said we told her to D/C because her K+ went back up, and continues PNV. She does mentions she wants the potassium level drawn at next visit, last visit had child with her and they weren't being cooperative so she planned to do next visit. Reported some tingling in hands and fingers and fatigue.

## 2022-09-27 ENCOUNTER — ROUTINE PRENATAL (OUTPATIENT)
Dept: OBGYN CLINIC | Age: 32
End: 2022-09-27
Payer: MEDICARE

## 2022-09-27 ENCOUNTER — HOSPITAL ENCOUNTER (OUTPATIENT)
Age: 32
Setting detail: SPECIMEN
Discharge: HOME | End: 2022-09-27

## 2022-09-27 VITALS — DIASTOLIC BLOOD PRESSURE: 80 MMHG | SYSTOLIC BLOOD PRESSURE: 130 MMHG | BODY MASS INDEX: 32.72 KG/M2 | WEIGHT: 215.2 LBS

## 2022-09-27 DIAGNOSIS — R53.83 FATIGUE, UNSPECIFIED TYPE: ICD-10-CM

## 2022-09-27 DIAGNOSIS — R51.9 PREGNANCY HEADACHE IN THIRD TRIMESTER: ICD-10-CM

## 2022-09-27 DIAGNOSIS — R35.0 URINARY FREQUENCY: ICD-10-CM

## 2022-09-27 DIAGNOSIS — R42 DIZZINESS: ICD-10-CM

## 2022-09-27 DIAGNOSIS — M54.9 BACK PAIN AFFECTING PREGNANCY IN THIRD TRIMESTER: ICD-10-CM

## 2022-09-27 DIAGNOSIS — O99.891 BACK PAIN AFFECTING PREGNANCY IN THIRD TRIMESTER: ICD-10-CM

## 2022-09-27 DIAGNOSIS — O26.893 PREGNANCY HEADACHE IN THIRD TRIMESTER: ICD-10-CM

## 2022-09-27 DIAGNOSIS — Z3A.27 27 WEEKS GESTATION OF PREGNANCY: ICD-10-CM

## 2022-09-27 DIAGNOSIS — O10.919 CHRONIC HYPERTENSION AFFECTING PREGNANCY: Primary | ICD-10-CM

## 2022-09-27 LAB
ABSOLUTE EOS #: 0.07 K/UL (ref 0–0.44)
ABSOLUTE IMMATURE GRANULOCYTE: 0.04 K/UL (ref 0–0.3)
ABSOLUTE LYMPH #: 1.32 K/UL (ref 1.1–3.7)
ABSOLUTE MONO #: 0.68 K/UL (ref 0.1–1.2)
APPEARANCE FLUID: NORMAL
BASOPHILS # BLD: 0 % (ref 0–2)
BASOPHILS ABSOLUTE: <0.03 K/UL (ref 0–0.2)
BILIRUBIN, POC: NORMAL
BLOOD URINE, POC: NORMAL
CLARITY, POC: CLEAR
COLOR, POC: NORMAL
EOSINOPHILS RELATIVE PERCENT: 1 % (ref 1–4)
GLUCOSE ADMINISTRATION: NORMAL
GLUCOSE TOLERANCE SCREEN 50G: 109 MG/DL (ref 70–135)
GLUCOSE URINE, POC: NORMAL
HCT VFR BLD CALC: 33.4 % (ref 36.3–47.1)
HEMOGLOBIN: 10.4 G/DL (ref 11.9–15.1)
IMMATURE GRANULOCYTES: 0 %
KETONES, POC: NORMAL
LEUKOCYTE EST, POC: NORMAL
LYMPHOCYTES # BLD: 14 % (ref 24–43)
MCH RBC QN AUTO: 28.3 PG (ref 25.2–33.5)
MCHC RBC AUTO-ENTMCNC: 31.1 G/DL (ref 28.4–34.8)
MCV RBC AUTO: 90.8 FL (ref 82.6–102.9)
MONOCYTES # BLD: 7 % (ref 3–12)
NITRITE, POC: NORMAL
NRBC AUTOMATED: 0 PER 100 WBC
PDW BLD-RTO: 13.9 % (ref 11.8–14.4)
PH, POC: 7.5
PLATELET # BLD: 240 K/UL (ref 138–453)
PMV BLD AUTO: 12.2 FL (ref 8.1–13.5)
PROTEIN, POC: NORMAL
RBC # BLD: 3.68 M/UL (ref 3.95–5.11)
SEG NEUTROPHILS: 78 % (ref 36–65)
SEGMENTED NEUTROPHILS ABSOLUTE COUNT: 7.34 K/UL (ref 1.5–8.1)
SPECIFIC GRAVITY, POC: 1.01
UROBILINOGEN, POC: NORMAL
WBC # BLD: 9.5 K/UL (ref 3.5–11.3)

## 2022-09-27 PROCEDURE — 99213 OFFICE O/P EST LOW 20 MIN: CPT | Performed by: ADVANCED PRACTICE MIDWIFE

## 2022-09-27 PROCEDURE — 1036F TOBACCO NON-USER: CPT | Performed by: ADVANCED PRACTICE MIDWIFE

## 2022-09-27 PROCEDURE — G8427 DOCREV CUR MEDS BY ELIG CLIN: HCPCS | Performed by: ADVANCED PRACTICE MIDWIFE

## 2022-09-27 PROCEDURE — G8417 CALC BMI ABV UP PARAM F/U: HCPCS | Performed by: ADVANCED PRACTICE MIDWIFE

## 2022-09-28 DIAGNOSIS — E87.6 HYPOKALEMIA: Primary | ICD-10-CM

## 2022-09-28 LAB
ALBUMIN SERPL-MCNC: 3.5 G/DL (ref 3.5–5.2)
ALBUMIN/GLOBULIN RATIO: 1.2 (ref 1–2.5)
ALP BLD-CCNC: 108 U/L (ref 35–104)
ALT SERPL-CCNC: 15 U/L (ref 5–33)
ANION GAP SERPL CALCULATED.3IONS-SCNC: 11 MMOL/L (ref 9–17)
AST SERPL-CCNC: 24 U/L
BILIRUB SERPL-MCNC: 0.3 MG/DL (ref 0.3–1.2)
BUN BLDV-MCNC: 5 MG/DL (ref 6–20)
CALCIUM SERPL-MCNC: 8.1 MG/DL (ref 8.6–10.4)
CHLORIDE BLD-SCNC: 99 MMOL/L (ref 98–107)
CO2: 26 MMOL/L (ref 20–31)
CREAT SERPL-MCNC: 0.53 MG/DL (ref 0.5–0.9)
CULTURE: NORMAL
GFR AFRICAN AMERICAN: >60 ML/MIN
GFR NON-AFRICAN AMERICAN: >60 ML/MIN
GFR SERPL CREATININE-BSD FRML MDRD: ABNORMAL ML/MIN/{1.73_M2}
GLUCOSE BLD-MCNC: 109 MG/DL (ref 70–99)
POTASSIUM SERPL-SCNC: 2.5 MMOL/L (ref 3.7–5.3)
SODIUM BLD-SCNC: 136 MMOL/L (ref 135–144)
SPECIMEN DESCRIPTION: NORMAL
TOTAL PROTEIN: 6.4 G/DL (ref 6.4–8.3)

## 2022-09-28 RX ORDER — POTASSIUM CHLORIDE 20 MEQ/1
40 TABLET, EXTENDED RELEASE ORAL 3 TIMES DAILY
Qty: 90 TABLET | Refills: 0 | Status: SHIPPED | OUTPATIENT
Start: 2022-09-28

## 2022-09-28 RX ORDER — FERROUS SULFATE 325(65) MG
325 TABLET ORAL 2 TIMES DAILY
Qty: 60 TABLET | Refills: 5 | Status: SHIPPED | OUTPATIENT
Start: 2022-09-28

## 2022-09-29 ENCOUNTER — ROUTINE PRENATAL (OUTPATIENT)
Dept: PERINATAL CARE | Age: 32
End: 2022-09-29
Payer: MEDICARE

## 2022-09-29 ENCOUNTER — TELEPHONE (OUTPATIENT)
Dept: PERINATAL CARE | Age: 32
End: 2022-09-29

## 2022-09-29 ENCOUNTER — TELEPHONE (OUTPATIENT)
Dept: OBGYN CLINIC | Age: 32
End: 2022-09-29

## 2022-09-29 ENCOUNTER — HOSPITAL ENCOUNTER (OUTPATIENT)
Age: 32
Discharge: HOME | End: 2022-09-29
Payer: MEDICARE

## 2022-09-29 VITALS
SYSTOLIC BLOOD PRESSURE: 133 MMHG | HEIGHT: 68 IN | TEMPERATURE: 98.1 F | RESPIRATION RATE: 18 BRPM | BODY MASS INDEX: 32.13 KG/M2 | DIASTOLIC BLOOD PRESSURE: 86 MMHG | HEART RATE: 106 BPM | WEIGHT: 212 LBS

## 2022-09-29 DIAGNOSIS — O99.213 OBESITY AFFECTING PREGNANCY IN THIRD TRIMESTER: ICD-10-CM

## 2022-09-29 DIAGNOSIS — Z3A.27 27 WEEKS GESTATION OF PREGNANCY: ICD-10-CM

## 2022-09-29 DIAGNOSIS — O09.899 SHORT INTERVAL BETWEEN PREGNANCIES COMPLICATING PREGNANCY, ANTEPARTUM: ICD-10-CM

## 2022-09-29 DIAGNOSIS — O16.3 HYPERTENSION AFFECTING PREGNANCY IN THIRD TRIMESTER: Primary | ICD-10-CM

## 2022-09-29 LAB
ABDOMINAL CIRCUMFERENCE: NORMAL
BIPARIETAL DIAMETER: NORMAL
ESTIMATED FETAL WEIGHT: NORMAL
FEMORAL DIAMETER: NORMAL
HC/AC: NORMAL
HEAD CIRCUMFERENCE: NORMAL

## 2022-09-29 PROCEDURE — 76811 OB US DETAILED SNGL FETUS: CPT | Performed by: OBSTETRICS & GYNECOLOGY

## 2022-09-29 PROCEDURE — 99243 OFF/OP CNSLTJ NEW/EST LOW 30: CPT | Performed by: OBSTETRICS & GYNECOLOGY

## 2022-09-29 PROCEDURE — 76820 UMBILICAL ARTERY ECHO: CPT | Performed by: OBSTETRICS & GYNECOLOGY

## 2022-09-29 PROCEDURE — G8427 DOCREV CUR MEDS BY ELIG CLIN: HCPCS | Performed by: OBSTETRICS & GYNECOLOGY

## 2022-09-29 PROCEDURE — 36415 COLL VENOUS BLD VENIPUNCTURE: CPT

## 2022-09-29 PROCEDURE — 76819 FETAL BIOPHYS PROFIL W/O NST: CPT | Performed by: OBSTETRICS & GYNECOLOGY

## 2022-09-29 PROCEDURE — G8417 CALC BMI ABV UP PARAM F/U: HCPCS | Performed by: OBSTETRICS & GYNECOLOGY

## 2022-09-29 NOTE — TELEPHONE ENCOUNTER
Per telephone call with Ty Arciniega in DEWEY Duff's office their office will handle all follow up including growth & doppler studies. Preethi informed and agreeable.

## 2022-09-29 NOTE — TELEPHONE ENCOUNTER
ABRAHAMI: [facility] called. They have patient in their office and are recommending growth and doppler ultrasounds every 4 weeks. Asking if we can do those here or they need to schedule there. I told them we could schedule them to be done here.

## 2022-09-30 LAB
SEND OUT REPORT: NORMAL
TEST NAME: NORMAL

## 2022-10-19 ENCOUNTER — ROUTINE PRENATAL (OUTPATIENT)
Dept: OBGYN CLINIC | Age: 32
End: 2022-10-19
Payer: MEDICARE

## 2022-10-19 VITALS — DIASTOLIC BLOOD PRESSURE: 82 MMHG | SYSTOLIC BLOOD PRESSURE: 120 MMHG | BODY MASS INDEX: 33.12 KG/M2 | WEIGHT: 217.8 LBS

## 2022-10-19 DIAGNOSIS — O10.919 CHRONIC HYPERTENSION AFFECTING PREGNANCY: Primary | ICD-10-CM

## 2022-10-19 DIAGNOSIS — Z3A.30 30 WEEKS GESTATION OF PREGNANCY: ICD-10-CM

## 2022-10-19 PROCEDURE — G8427 DOCREV CUR MEDS BY ELIG CLIN: HCPCS | Performed by: ADVANCED PRACTICE MIDWIFE

## 2022-10-19 PROCEDURE — 1036F TOBACCO NON-USER: CPT | Performed by: ADVANCED PRACTICE MIDWIFE

## 2022-10-19 PROCEDURE — G8417 CALC BMI ABV UP PARAM F/U: HCPCS | Performed by: ADVANCED PRACTICE MIDWIFE

## 2022-10-19 PROCEDURE — G8484 FLU IMMUNIZE NO ADMIN: HCPCS | Performed by: ADVANCED PRACTICE MIDWIFE

## 2022-10-19 PROCEDURE — 99213 OFFICE O/P EST LOW 20 MIN: CPT | Performed by: ADVANCED PRACTICE MIDWIFE

## 2022-10-19 RX ORDER — LABETALOL 100 MG/1
TABLET, FILM COATED ORAL
Qty: 270 TABLET | Refills: 2 | Status: SHIPPED | OUTPATIENT
Start: 2022-10-19

## 2022-10-19 NOTE — PROGRESS NOTES
Juventino Campbell is a 28 y.o. female 30w4d    The patient was seen and evaluated. There was positive fetal movements. No contractions or leakage of fluid. Signs and symptoms of  labor as well as labor were reviewed. The S/S of Pre-Eclampsia were reviewed with the patient in detail. She is to report any of these if they occur. She currently denies any of these. Reviewed [facility] notes. Refill of labetalol sent to [facility]    The patient had her 28 week labs completed.   Hospital Outpatient Visit on 2022   Component Date Value Ref Range Status    Test Name 2022 [facility]   Final    Send Out Report 2022 [facility] TO [facility] Outpatient Visit on 2022   Component Date Value Ref Range Status    GLU ADMN 2022 Glucola   Final    Glucose tolerance screen 50g 2022 109  70 - 135 mg/dL Final    WBC 2022 9.5  3.5 - 11.3 k/uL Final    RBC 2022 3.68 (A)  3.95 - 5.11 m/uL Final    Hemoglobin 2022 10.4 (A)  11.9 - 15.1 g/dL Final    Hematocrit 2022 33.4 (A)  36.3 - 47.1 % Final    MCV 2022 90.8  82.6 - 102.9 fL Final    MCH 2022 28.3  25.2 - 33.5 pg Final    MCHC 2022 31.1  28.4 - 34.8 g/dL Final    RDW 2022 13.9  11.8 - 14.4 % Final    Platelets  240  138 - 453 k/uL Final    MPV 2022 12.2  8.1 - 13.5 fL Final    NRBC Automated 2022 0.0  0.0 per 100 WBC Final    Seg Neutrophils 2022 78 (A)  36 - 65 % Final    Lymphocytes 2022 14 (A)  24 - 43 % Final    Monocytes 2022 7  3 - 12 % Final    Eosinophils % 2022 1  1 - 4 % Final    Basophils 2022 0  0 - 2 % Final    Immature Granulocytes 2022 0  0 % Final    Segs Absolute 2022 7.34  1.50 - 8.10 k/uL Final    Absolute Lymph # 2022 1.32  1.10 - 3.70 k/uL Final    Absolute Mono # 2022 0.68  0.10 - 1.20 k/uL Final    Absolute Eos # 2022 0.07  0.00 - 0.44 k/uL Final    Basophils Absolute 09/27/2022 <0.03  0.00 - 0.20 k/uL Final    Absolute Immature Granulocyte 09/27/2022 0.04  0.00 - 0.30 k/uL Final    Glucose 09/27/2022 109 (A)  70 - 99 mg/dL Final    PATIENT NOT FASTING    BUN 09/27/2022 5 (A)  6 - 20 mg/dL Final    Creatinine 09/27/2022 0.53  0.50 - 0.90 mg/dL Final    Calcium 09/27/2022 8.1 (A)  8.6 - 10.4 mg/dL Final    Sodium 09/27/2022 136  135 - 144 mmol/L Final    Potassium 09/27/2022 2.5 (A)  3.7 - 5.3 mmol/L Final    Chloride 09/27/2022 99  98 - 107 mmol/L Final    CO2 09/27/2022 26  20 - 31 mmol/L Final    Anion Gap 09/27/2022 11  9 - 17 mmol/L Final    Alkaline Phosphatase 09/27/2022 108 (A)  35 - 104 U/L Final    ALT 09/27/2022 15  5 - 33 U/L Final    AST 09/27/2022 24  <32 U/L Final    Total Bilirubin 09/27/2022 0.3  0.3 - 1.2 mg/dL Final    Total Protein 09/27/2022 6.4  6.4 - 8.3 g/dL Final    Albumin 09/27/2022 3.5  3.5 - 5.2 g/dL Final    Albumin/Globulin Ratio 09/27/2022 1.2  1.0 - 2.5 Final    GFR Non- 09/27/2022 >60  >60 mL/min Final    GFR  09/27/2022 >60  >60 mL/min Final    GFR Comment 09/27/2022        Final    Comment: Average GFR for 30-36 years old:   80 mL/min/1.73sq m  Chronic Kidney Disease:   <60 mL/min/1.73sq m  Kidney failure:   <15 mL/min/1.73sq m              eGFR calculated using average adult body mass. Additional eGFR calculator available at:        [employer]            Specimen Description 09/27/2022 . CLEAN CATCH URINE   Final    Culture 09/27/2022 NO SIGNIFICANT GROWTH   Final   Routine Prenatal on 09/27/2022   Component Date Value Ref Range Status    Color, UA 09/27/2022 light yellow   Final    Clarity, UA 09/27/2022 clear   Final    Glucose, UA POC 09/27/2022 -   Final    Bilirubin, UA 09/27/2022 -   Final    Ketones, UA 09/27/2022 -   Final    Spec Grav, UA 09/27/2022 1.010   Final    Blood, UA POC 09/27/2022 -   Final    pH, UA 2022 7.5   Final    Protein, UA POC 2022 -   Final    Urobilinogen, UA 2022 -   Final    Leukocytes, UA 2022 -   Final    Nitrite, UA 2022 -   Final         The patient was instructed on fetal kick counts and is encouraged to monitor every 8 hours. She was instructed that the baby should move at a minimum of ten times within one hour after a meal. The patient was instructed to lay down on her left side twenty minutes after eating and count movements for up to one hour with a target value of ten movements. She was instructed to notify the office if she did not make that target after two attempts or if after any attempt there was less than four movements. The patient reports that the fetal movement targets have been made Yes.  Testing:  EFW 3#11oz  LYNNE 14cm  CXL 4.1cm    Assessment:  1Denis Smith is a 28 y.o. female  2. [id number]  3. 30w4d    Patient Active Problem List    Diagnosis Date Noted    Hypokalemia 2022     Priority: Medium    Chronic HTN (meds) 01/15/2021     Labetalol 300mg TID      Hx cocaine use 01/15/2021     Last use  per patient report  UDS negative 1/15/21      H/O dilation and curettage (2019) 01/15/2021    Hx trichomonas 01/15/2021    COVID pos 1/15/21 01/15/2021        Diagnosis Orders   1. Chronic hypertension affecting pregnancy        2. 30 weeks gestation of pregnancy              Plan:  The patient will return to the office for her next visit in 2 weeks. See antepartum flow sheet. Visits will continue every 2 weeks in the third trimester. At 36 weeks will have GBS swab performed and begin weekly visits.  Testing Indicated: Yes  Scheduled with [id number] Office Staff - Pt notified:  Yes

## 2022-10-20 ENCOUNTER — TELEPHONE (OUTPATIENT)
Dept: PERINATAL CARE | Age: 32
End: 2022-10-20

## 2022-10-20 NOTE — TELEPHONE ENCOUNTER
Unable to reach patient RE: maternal carrier results (went straight to voice mail), left HIPAA -compliant message to phone office.

## 2022-10-26 ENCOUNTER — TELEPHONE (OUTPATIENT)
Dept: PERINATAL CARE | Age: 32
End: 2022-10-26

## 2022-10-26 NOTE — TELEPHONE ENCOUNTER
Unable to reach patient (call went straight to voice mail), left HIPAA compliant message to phone office.

## 2022-11-01 ENCOUNTER — ROUTINE PRENATAL (OUTPATIENT)
Dept: OBGYN CLINIC | Age: 32
End: 2022-11-01
Payer: MEDICARE

## 2022-11-01 VITALS — SYSTOLIC BLOOD PRESSURE: 112 MMHG | WEIGHT: 215.6 LBS | BODY MASS INDEX: 32.78 KG/M2 | DIASTOLIC BLOOD PRESSURE: 78 MMHG

## 2022-11-01 DIAGNOSIS — O10.919 CHRONIC HYPERTENSION AFFECTING PREGNANCY: Primary | ICD-10-CM

## 2022-11-01 DIAGNOSIS — Z3A.32 32 WEEKS GESTATION OF PREGNANCY: ICD-10-CM

## 2022-11-01 PROCEDURE — 90471 IMMUNIZATION ADMIN: CPT | Performed by: ADVANCED PRACTICE MIDWIFE

## 2022-11-01 PROCEDURE — G8417 CALC BMI ABV UP PARAM F/U: HCPCS | Performed by: ADVANCED PRACTICE MIDWIFE

## 2022-11-01 PROCEDURE — 1036F TOBACCO NON-USER: CPT | Performed by: ADVANCED PRACTICE MIDWIFE

## 2022-11-01 PROCEDURE — G8484 FLU IMMUNIZE NO ADMIN: HCPCS | Performed by: ADVANCED PRACTICE MIDWIFE

## 2022-11-01 PROCEDURE — 90715 TDAP VACCINE 7 YRS/> IM: CPT | Performed by: ADVANCED PRACTICE MIDWIFE

## 2022-11-01 PROCEDURE — G8427 DOCREV CUR MEDS BY ELIG CLIN: HCPCS | Performed by: ADVANCED PRACTICE MIDWIFE

## 2022-11-01 PROCEDURE — 99213 OFFICE O/P EST LOW 20 MIN: CPT | Performed by: ADVANCED PRACTICE MIDWIFE

## 2022-11-01 NOTE — PROGRESS NOTES
Reza Valerio is a 28 y.o. female 32w3d    The patient was seen and evaluated. There was positive fetal movements. No contractions or leakage of fluid. Signs and symptoms of  labor as well as labor were reviewed. The S/S of Pre-Eclampsia were reviewed with the patient in detail. She is to report any of these if they occur. She currently denies any of these. Having more crissy zendejas - no bleeding, spotting. Disucssed normal crissy zendejas vs abnormal s/s of PTL she should call for. The patient had her 28 week labs completed. Hospital Outpatient Visit on 2022   Component Date Value Ref Range Status    Test Name 2022 [employer] CARRIER   Final    Send Out Report 2022 [employer]   Final         T-Dap Vaccine (27-36 weeks): completed    The patient was instructed on fetal kick counts and is encouraged to monitor every 8 hours. She was instructed that the baby should move at a minimum of ten times within one hour after a meal. The patient was instructed to lay down on her left side twenty minutes after eating and count movements for up to one hour with a target value of ten movements. She was instructed to notify the office if she did not make that target after two attempts or if after any attempt there was less than four movements. The patient reports that the fetal movement targets have been made Yes.  Testing:  BPP     Assessment:  1. Reza Valerio is a 28 y.o. female  2. [id number]  3. 32w3d    Patient Active Problem List    Diagnosis Date Noted    Hypokalemia 2022     Priority: Medium    Chronic HTN (meds) 01/15/2021     Labetalol 300mg TID      Hx cocaine use 01/15/2021     Last use  per patient report  UDS negative 1/15/21      H/O dilation and curettage (2019) 01/15/2021    Hx trichomonas 01/15/2021    COVID pos 1/15/21 01/15/2021        Diagnosis Orders   1.  Chronic hypertension affecting pregnancy        2. 32 weeks gestation of pregnancy              Plan:  The patient will return to the office for her next visit in 1 weeks. See antepartum flow sheet. Visits will continue every 2 weeks in the third trimester. At 36 weeks will have GBS swab performed and begin weekly visits.  Testing Indicated: Yes  Scheduled with [facility] Office Staff - Pt notified:  Yes

## 2022-11-03 ENCOUNTER — PROCEDURE VISIT (OUTPATIENT)
Dept: OBGYN CLINIC | Age: 32
End: 2022-11-03
Payer: MEDICARE

## 2022-11-03 VITALS — BODY MASS INDEX: 33.06 KG/M2 | DIASTOLIC BLOOD PRESSURE: 86 MMHG | SYSTOLIC BLOOD PRESSURE: 126 MMHG | WEIGHT: 217.4 LBS

## 2022-11-03 DIAGNOSIS — O10.919 CHRONIC HYPERTENSION IN PREGNANCY: Primary | ICD-10-CM

## 2022-11-03 PROCEDURE — 59025 FETAL NON-STRESS TEST: CPT | Performed by: ADVANCED PRACTICE MIDWIFE

## 2022-11-03 NOTE — PROGRESS NOTES
Krista Due is here at 32w5d for an NST due to chronic hypertension    FHT;        Baseline Heart Rate:  140        Accelerations:  present       Long Term Variability:  moderate       Decelerations:  absent         Contraction frequency: no uterine activity      reactive    Plan: Continue twice/wk monitoring

## 2022-11-08 ENCOUNTER — HOSPITAL ENCOUNTER (OUTPATIENT)
Age: 32
Setting detail: SPECIMEN
Discharge: HOME | End: 2022-11-08

## 2022-11-08 ENCOUNTER — ROUTINE PRENATAL (OUTPATIENT)
Dept: OBGYN CLINIC | Age: 32
End: 2022-11-08
Payer: MEDICARE

## 2022-11-08 VITALS — DIASTOLIC BLOOD PRESSURE: 98 MMHG | SYSTOLIC BLOOD PRESSURE: 138 MMHG | WEIGHT: 218.4 LBS | BODY MASS INDEX: 33.21 KG/M2

## 2022-11-08 DIAGNOSIS — O10.919 CHRONIC HYPERTENSION IN PREGNANCY: Primary | ICD-10-CM

## 2022-11-08 DIAGNOSIS — O10.919 CHRONIC HYPERTENSION IN PREGNANCY: ICD-10-CM

## 2022-11-08 DIAGNOSIS — Z3A.33 33 WEEKS GESTATION OF PREGNANCY: ICD-10-CM

## 2022-11-08 LAB
ABSOLUTE EOS #: 0.1 K/UL (ref 0–0.44)
ABSOLUTE IMMATURE GRANULOCYTE: <0.03 K/UL (ref 0–0.3)
ABSOLUTE LYMPH #: 1.47 K/UL (ref 1.1–3.7)
ABSOLUTE MONO #: 0.4 K/UL (ref 0.1–1.2)
BASOPHILS # BLD: 0 % (ref 0–2)
BASOPHILS ABSOLUTE: <0.03 K/UL (ref 0–0.2)
EOSINOPHILS RELATIVE PERCENT: 2 % (ref 1–4)
HCT VFR BLD CALC: 36.6 % (ref 36.3–47.1)
HEMOGLOBIN: 11.6 G/DL (ref 11.9–15.1)
IMMATURE GRANULOCYTES: 0 %
LYMPHOCYTES # BLD: 25 % (ref 24–43)
MCH RBC QN AUTO: 28.2 PG (ref 25.2–33.5)
MCHC RBC AUTO-ENTMCNC: 31.7 G/DL (ref 28.4–34.8)
MCV RBC AUTO: 88.8 FL (ref 82.6–102.9)
MONOCYTES # BLD: 7 % (ref 3–12)
NRBC AUTOMATED: 0 PER 100 WBC
PDW BLD-RTO: 14.3 % (ref 11.8–14.4)
PLATELET # BLD: 266 K/UL (ref 138–453)
PMV BLD AUTO: 12.2 FL (ref 8.1–13.5)
RBC # BLD: 4.12 M/UL (ref 3.95–5.11)
SEG NEUTROPHILS: 66 % (ref 36–65)
SEGMENTED NEUTROPHILS ABSOLUTE COUNT: 3.88 K/UL (ref 1.5–8.1)
WBC # BLD: 5.9 K/UL (ref 3.5–11.3)

## 2022-11-08 PROCEDURE — 99213 OFFICE O/P EST LOW 20 MIN: CPT | Performed by: ADVANCED PRACTICE MIDWIFE

## 2022-11-08 PROCEDURE — G8484 FLU IMMUNIZE NO ADMIN: HCPCS | Performed by: ADVANCED PRACTICE MIDWIFE

## 2022-11-08 PROCEDURE — 1036F TOBACCO NON-USER: CPT | Performed by: ADVANCED PRACTICE MIDWIFE

## 2022-11-08 PROCEDURE — G8427 DOCREV CUR MEDS BY ELIG CLIN: HCPCS | Performed by: ADVANCED PRACTICE MIDWIFE

## 2022-11-08 PROCEDURE — G8417 CALC BMI ABV UP PARAM F/U: HCPCS | Performed by: ADVANCED PRACTICE MIDWIFE

## 2022-11-08 NOTE — PROGRESS NOTES
Ely Haas is a 28 y.o. female 33w3d    The patient was seen and evaluated. There was positive fetal movements. No contractions or leakage of fluid. Signs and symptoms of  labor as well as labor were reviewed. The S/S of Pre-Eclampsia were reviewed with the patient in detail. She is to report any of these if they occur. She currently denies any of these. Has NOT taken any BP medication today - reports \"daughter is going through a sleep regression and not going to bed until 3am and then up early again\" - did not set an alarm for her medication dosing. Reports that child also wanting to nurse again often and \"try to distract her but then shes losing it so I just give in\". Stressed importance of regular dosing. Patient agreeable to do [facility] labs today, will take BP medication when she gets home and call our office in an hour with BP update. The patient had her 28 week labs - awaiting results from [facility] - they have tried to reach her but patient report no missed calls. No visits with results within 5 Week(s) from this visit. Latest known visit with results is:   Hospital Outpatient Visit on 2022   Component Date Value Ref Range Status    Test Name 2022 [employer] CARRIER   Final    Send Out Report 2022 [facility] [employer]   [facility]       The patient was instructed on fetal kick counts and is encouraged to monitor every 8 hours. She was instructed that the baby should move at a minimum of ten times within one hour after a meal. The patient was instructed to lay down on her left side twenty minutes after eating and count movements for up to one hour with a target value of ten movements. She was instructed to notify the office if she did not make that target after two attempts or if after any attempt there was less than four movements. The patient reports that the fetal movement targets have been made Yes.  Testing:  BPP     Assessment:  1.  Ely Haas is a 28 y.o. female  2. [id number]  3. 33w3d    Patient Active Problem List    Diagnosis Date Noted    Hypokalemia 2022     Priority: Medium    Chronic HTN (meds) 01/15/2021     Labetalol 300mg TID      Hx cocaine use 01/15/2021     Last use  per patient report  UDS negative 1/15/21      H/O dilation and curettage (2019) 01/15/2021    Hx trichomonas 01/15/2021    COVID pos 1/15/21 01/15/2021        Diagnosis Orders   1. Chronic hypertension in pregnancy  CBC with Auto Differential    Comprehensive Metabolic Panel    Uric Acid    Protein / creatinine ratio, urine      2. 33 weeks gestation of pregnancy              Plan:  The patient will return to the office for her next visit in 1 weeks. See antepartum flow sheet. Visits will continue every 2 weeks in the third trimester. At 36 weeks will have GBS swab performed and begin weekly visits.  Testing Indicated: Yes  Scheduled with [facility] Office Staff - Pt notified:  Yes

## 2022-11-08 NOTE — TELEPHONE ENCOUNTER
Collette - patient was in our office today  She requested the results of carrier screening - I do not see them in her chart. She said has not had a missed call from your office. Please reach out to her again or scan them into chart for us to review with her.   Her phone number is [phone]  Second number to reach is partner [phone] Sade Maynard)

## 2022-11-09 LAB
ALBUMIN SERPL-MCNC: 3.4 G/DL (ref 3.5–5.2)
ALBUMIN/GLOBULIN RATIO: 1.1 (ref 1–2.5)
ALP BLD-CCNC: 136 U/L (ref 35–104)
ALT SERPL-CCNC: 8 U/L (ref 5–33)
ANION GAP SERPL CALCULATED.3IONS-SCNC: 12 MMOL/L (ref 9–17)
AST SERPL-CCNC: 15 U/L
BILIRUB SERPL-MCNC: 0.3 MG/DL (ref 0.3–1.2)
BUN BLDV-MCNC: 4 MG/DL (ref 6–20)
CALCIUM SERPL-MCNC: 8.4 MG/DL (ref 8.6–10.4)
CHLORIDE BLD-SCNC: 105 MMOL/L (ref 98–107)
CO2: 22 MMOL/L (ref 20–31)
CREAT SERPL-MCNC: 0.5 MG/DL (ref 0.5–0.9)
GFR SERPL CREATININE-BSD FRML MDRD: >60 ML/MIN/1.73M2
GLUCOSE BLD-MCNC: 59 MG/DL (ref 70–99)
POTASSIUM SERPL-SCNC: 3.3 MMOL/L (ref 3.7–5.3)
SODIUM BLD-SCNC: 139 MMOL/L (ref 135–144)
TOTAL PROTEIN: 6.6 G/DL (ref 6.4–8.3)
URIC ACID: 4.2 MG/DL (ref 2.4–5.7)

## 2022-11-10 ENCOUNTER — PROCEDURE VISIT (OUTPATIENT)
Dept: OBGYN CLINIC | Age: 32
End: 2022-11-10
Payer: MEDICARE

## 2022-11-10 ENCOUNTER — HOSPITAL ENCOUNTER (OUTPATIENT)
Age: 32
Setting detail: SPECIMEN
Discharge: HOME | End: 2022-11-10

## 2022-11-10 VITALS — DIASTOLIC BLOOD PRESSURE: 88 MMHG | SYSTOLIC BLOOD PRESSURE: 128 MMHG | WEIGHT: 218.4 LBS | BODY MASS INDEX: 33.21 KG/M2

## 2022-11-10 DIAGNOSIS — O10.919 CHRONIC HYPERTENSION IN PREGNANCY: Primary | ICD-10-CM

## 2022-11-10 DIAGNOSIS — O10.919 CHRONIC HYPERTENSION IN PREGNANCY: ICD-10-CM

## 2022-11-10 PROCEDURE — 59025 FETAL NON-STRESS TEST: CPT | Performed by: ADVANCED PRACTICE MIDWIFE

## 2022-11-10 NOTE — PROGRESS NOTES
Aleksandra Cole is here at 33w5d for an NST due to chronic hypertension    FHT;        Baseline Heart Rate:  150        Accelerations:  present       Long Term Variability:  moderate       Decelerations:  absent         Contraction frequency: No regular uterine activity      reactive    Plan: continue twice/wk monitoring

## 2022-11-11 LAB
CREATININE URINE: 68.7 MG/DL (ref 28–217)
TOTAL PROTEIN, URINE: 16 MG/DL
URINE TOTAL PROTEIN CREATININE RATIO: 0.23 (ref 0–0.2)

## 2022-11-13 ENCOUNTER — NURSE TRIAGE (OUTPATIENT)
Dept: OTHER | Age: 32
End: 2022-11-13

## 2022-11-13 NOTE — TELEPHONE ENCOUNTER
Answer Assessment - Initial Assessment Questions  1. REASON FOR CALL or QUESTION: \"What is your reason for calling today? \" or \"How can I best help you? \" or \"What question do you have that I can help answer? \"      Patient is 34 weeks and 1 day pregnant. Patient is calling because her blood pressure has been high since last night. At 1 am it was /94. Blood pressure this morning was 137/82 but at 3pm 146/101. Patient has been tracking her blood every 30 mins. The highest blood pressure today at 2:30 was 164/99. Patient states she has been taking her blood pressure medication routinely. Patient has slight headache, dizziness, and light headedness today but last night she was seeing spots. Patient states she does not have blurred vision. Protocols used:  Information Only Call - [facility] T[facility]

## 2022-11-13 NOTE — TELEPHONE ENCOUNTER
Referred to proceed now to [address] L& D entering thru the ED per office provider's guidelines.  Patient verbalized understanding and stated that she has someone driving her to the hospital. JODEE, RN

## 2022-11-14 NOTE — TELEPHONE ENCOUNTER
Patient called to give an  update on her BP readings. She has been monitoring them since she called the on-call. She is still experiencing some spots in her vision this morning, but she took her am dose of meds about 30 minutes before calling. She said that after resting yesterday, her BP came down to 134/82. Her readings have been up to 174/90s. She was instructed to to go L&D from on-call, but per patient between lack of transportation and childcare, it was not possible for her to go. She had labs/urine at the end of last week. Patient has an appointment on Wed for a BPP and OB visit. She is currently taking 300mg of labetalol three times a day. Can you please review and give recommendations?

## 2022-11-16 ENCOUNTER — ROUTINE PRENATAL (OUTPATIENT)
Dept: OBGYN CLINIC | Age: 32
End: 2022-11-16
Payer: MEDICARE

## 2022-11-16 VITALS — WEIGHT: 219 LBS | BODY MASS INDEX: 33.3 KG/M2 | DIASTOLIC BLOOD PRESSURE: 91 MMHG | SYSTOLIC BLOOD PRESSURE: 130 MMHG

## 2022-11-16 DIAGNOSIS — Z3A.34 34 WEEKS GESTATION OF PREGNANCY: ICD-10-CM

## 2022-11-16 DIAGNOSIS — O10.919 CHRONIC HYPERTENSION IN PREGNANCY: Primary | ICD-10-CM

## 2022-11-16 PROCEDURE — G8484 FLU IMMUNIZE NO ADMIN: HCPCS | Performed by: OBSTETRICS & GYNECOLOGY

## 2022-11-16 PROCEDURE — G8417 CALC BMI ABV UP PARAM F/U: HCPCS | Performed by: OBSTETRICS & GYNECOLOGY

## 2022-11-16 PROCEDURE — G8427 DOCREV CUR MEDS BY ELIG CLIN: HCPCS | Performed by: OBSTETRICS & GYNECOLOGY

## 2022-11-16 PROCEDURE — 99213 OFFICE O/P EST LOW 20 MIN: CPT | Performed by: OBSTETRICS & GYNECOLOGY

## 2022-11-16 PROCEDURE — 1036F TOBACCO NON-USER: CPT | Performed by: OBSTETRICS & GYNECOLOGY

## 2022-11-16 RX ORDER — LABETALOL 200 MG/1
400 TABLET, FILM COATED ORAL 3 TIMES DAILY
Qty: 120 TABLET | Refills: 1 | Status: ON HOLD | OUTPATIENT
Start: 2022-11-16 | End: 2022-11-30 | Stop reason: SDUPTHER

## 2022-11-16 NOTE — PROGRESS NOTES
Pt had bpp today  lety 14  States that bp's have been elevated at home and she has had some headaches; disc'd inc in labetalol to 400 tid; new rx sent [facility]  Pt aware that 37 week IOL is plan unless she develops pre-e or fails  testing

## 2022-11-22 ENCOUNTER — HOSPITAL ENCOUNTER (OUTPATIENT)
Age: 32
Setting detail: SPECIMEN
Discharge: HOME | End: 2022-11-22

## 2022-11-22 ENCOUNTER — ROUTINE PRENATAL (OUTPATIENT)
Dept: OBGYN CLINIC | Age: 32
End: 2022-11-22
Payer: MEDICARE

## 2022-11-22 VITALS — BODY MASS INDEX: 33.91 KG/M2 | SYSTOLIC BLOOD PRESSURE: 130 MMHG | DIASTOLIC BLOOD PRESSURE: 90 MMHG | WEIGHT: 223 LBS

## 2022-11-22 DIAGNOSIS — O10.919 CHRONIC HYPERTENSION IN PREGNANCY: ICD-10-CM

## 2022-11-22 DIAGNOSIS — O10.919 CHRONIC HYPERTENSION IN PREGNANCY: Primary | ICD-10-CM

## 2022-11-22 LAB
ABSOLUTE EOS #: 0.06 K/UL (ref 0–0.44)
ABSOLUTE IMMATURE GRANULOCYTE: <0.03 K/UL (ref 0–0.3)
ABSOLUTE LYMPH #: 1.55 K/UL (ref 1.1–3.7)
ABSOLUTE MONO #: 0.48 K/UL (ref 0.1–1.2)
ALBUMIN SERPL-MCNC: 3.2 G/DL (ref 3.5–5.2)
ALBUMIN/GLOBULIN RATIO: 1 (ref 1–2.5)
ALP BLD-CCNC: 142 U/L (ref 35–104)
ALT SERPL-CCNC: 7 U/L (ref 5–33)
ANION GAP SERPL CALCULATED.3IONS-SCNC: 14 MMOL/L (ref 9–17)
AST SERPL-CCNC: 16 U/L
BASOPHILS # BLD: 0 % (ref 0–2)
BASOPHILS ABSOLUTE: <0.03 K/UL (ref 0–0.2)
BILIRUB SERPL-MCNC: 0.4 MG/DL (ref 0.3–1.2)
BUN BLDV-MCNC: 5 MG/DL (ref 6–20)
CALCIUM SERPL-MCNC: 8.4 MG/DL (ref 8.6–10.4)
CHLORIDE BLD-SCNC: 106 MMOL/L (ref 98–107)
CO2: 21 MMOL/L (ref 20–31)
CREAT SERPL-MCNC: 0.49 MG/DL (ref 0.5–0.9)
CREATININE URINE: 210.4 MG/DL (ref 28–217)
EOSINOPHILS RELATIVE PERCENT: 1 % (ref 1–4)
GFR SERPL CREATININE-BSD FRML MDRD: >60 ML/MIN/1.73M2
GLUCOSE BLD-MCNC: 91 MG/DL (ref 70–99)
HCT VFR BLD CALC: 34.7 % (ref 36.3–47.1)
HEMOGLOBIN: 10.9 G/DL (ref 11.9–15.1)
IMMATURE GRANULOCYTES: 0 %
LYMPHOCYTES # BLD: 27 % (ref 24–43)
MCH RBC QN AUTO: 27.7 PG (ref 25.2–33.5)
MCHC RBC AUTO-ENTMCNC: 31.4 G/DL (ref 28.4–34.8)
MCV RBC AUTO: 88.1 FL (ref 82.6–102.9)
MONOCYTES # BLD: 9 % (ref 3–12)
NRBC AUTOMATED: 0 PER 100 WBC
PDW BLD-RTO: 14.5 % (ref 11.8–14.4)
PLATELET # BLD: 222 K/UL (ref 138–453)
PMV BLD AUTO: 12.7 FL (ref 8.1–13.5)
POTASSIUM SERPL-SCNC: 3.1 MMOL/L (ref 3.7–5.3)
RBC # BLD: 3.94 M/UL (ref 3.95–5.11)
RBC # BLD: ABNORMAL 10*6/UL
SEG NEUTROPHILS: 63 % (ref 36–65)
SEGMENTED NEUTROPHILS ABSOLUTE COUNT: 3.55 K/UL (ref 1.5–8.1)
SODIUM BLD-SCNC: 141 MMOL/L (ref 135–144)
TOTAL PROTEIN, URINE: 45 MG/DL
TOTAL PROTEIN: 6.3 G/DL (ref 6.4–8.3)
URIC ACID: 4.3 MG/DL (ref 2.4–5.7)
URINE TOTAL PROTEIN CREATININE RATIO: 0.21 (ref 0–0.2)
WBC # BLD: 5.7 K/UL (ref 3.5–11.3)

## 2022-11-22 PROCEDURE — 1036F TOBACCO NON-USER: CPT | Performed by: ADVANCED PRACTICE MIDWIFE

## 2022-11-22 PROCEDURE — G8417 CALC BMI ABV UP PARAM F/U: HCPCS | Performed by: ADVANCED PRACTICE MIDWIFE

## 2022-11-22 PROCEDURE — 99213 OFFICE O/P EST LOW 20 MIN: CPT | Performed by: ADVANCED PRACTICE MIDWIFE

## 2022-11-22 PROCEDURE — G8427 DOCREV CUR MEDS BY ELIG CLIN: HCPCS | Performed by: ADVANCED PRACTICE MIDWIFE

## 2022-11-22 PROCEDURE — G8484 FLU IMMUNIZE NO ADMIN: HCPCS | Performed by: ADVANCED PRACTICE MIDWIFE

## 2022-11-22 NOTE — PROGRESS NOTES
Mp Perez is a 28 y.o. female 35w3d    The patient was seen and evaluated. There was positive fetal movements. No contractions or leakage of fluid. Signs and symptoms of  labor as well as labor were reviewed. The S/S of Pre-Eclampsia were reviewed with the patient in detail. She is to report any of these if they occur. She currently denies any of these. Reports yesterday after last visit has been taking Labetalol 400mg TID, Procardia 10mg TID. Overall doing okay but admits yesterday headache - resolved within 30 minutes on own. Dizziness in the last couple days with nausea intermittently. Home +/90+. Has not called office. Discussed if /90 then call office or go to hospital - admits that this is hard due to children and no vehicle. IOL scheduled for  at 0600. [facility] labs to be done today - she is agreeable. The patient had her 28 week labs completed. Hospital Outpatient Visit on 11/10/2022   Component Date Value Ref Range Status    Total Protein, Urine 11/10/2022 16  mg/dL Final    No normal range established.  Creatinine, Ur 11/10/2022 68.7  28.0 - 217.0 mg/dL Final    Urine Total Protein Creatinine Rat* 11/10/2022 0.23 (A)  0.00 - 0.20 Final   Hospital Outpatient Visit on 2022   Component Date Value Ref Range Status    Uric Acid 2022 4.2  2.4 - 5.7 mg/dL Final    Glucose 2022 59 (A)  70 - 99 mg/dL Final    BUN 2022 4 (A)  6 - 20 mg/dL Final    Creatinine 2022 0.50  0.50 - 0.90 mg/dL Final    Est, Glom Filt Rate 2022 >60  >60 mL/min/1.73m2 Final    Comment:       Effective Oct 3, 2022        These results are not intended for use in patients <25years of age. eGFR results are calculated without a race factor using the  CKD-EPI equation. Careful clinical correlation is recommended, particularly when comparing to results   calculated using previous equations.   The CKD-EPI equation is less accurate in patients with extremes of muscle mass, extra-renal   metabolism of creatine, excessive creatine ingestion, or following therapy that affects   renal tubular secretion.       Calcium 11/08/2022 8.4 (A)  8.6 - 10.4 mg/dL Final    Sodium 11/08/2022 139  135 - 144 mmol/L Final    Potassium 11/08/2022 3.3 (A)  3.7 - 5.3 mmol/L Final    Chloride 11/08/2022 105  98 - 107 mmol/L Final    CO2 11/08/2022 22  20 - 31 mmol/L Final    Anion Gap 11/08/2022 12  9 - 17 mmol/L Final    Alkaline Phosphatase 11/08/2022 136 (A)  35 - 104 U/L Final    ALT 11/08/2022 8  5 - 33 U/L Final    AST 11/08/2022 15  <32 U/L Final    Total Bilirubin 11/08/2022 0.3  0.3 - 1.2 mg/dL Final    Total Protein 11/08/2022 6.6  6.4 - 8.3 g/dL Final    Albumin 11/08/2022 3.4 (A)  3.5 - 5.2 g/dL Final    Albumin/Globulin Ratio 11/08/2022 1.1  1.0 - 2.5 Final    WBC 11/08/2022 5.9  3.5 - 11.3 k/uL Final    RBC 11/08/2022 4.12  3.95 - 5.11 m/uL Final    Hemoglobin 11/08/2022 11.6 (A)  11.9 - 15.1 g/dL Final    Hematocrit 11/08/2022 36.6  36.3 - 47.1 % Final    MCV 11/08/2022 88.8  82.6 - 102.9 fL Final    MCH 11/08/2022 28.2  25.2 - 33.5 pg Final    MCHC 11/08/2022 31.7  28.4 - 34.8 g/dL Final    RDW 11/08/2022 14.3  11.8 - 14.4 % Final    Platelets 50/08/3431 266  138 - 453 k/uL Final    MPV 11/08/2022 12.2  8.1 - 13.5 fL Final    NRBC Automated 11/08/2022 0.0  0.0 per 100 WBC Final    Seg Neutrophils 11/08/2022 66 (A)  36 - 65 % Final    Lymphocytes 11/08/2022 25  24 - 43 % Final    Monocytes 11/08/2022 7  3 - 12 % Final    Eosinophils % 11/08/2022 2  1 - 4 % Final    Basophils 11/08/2022 0  0 - 2 % Final    Immature Granulocytes 11/08/2022 0  0 % Final    Segs Absolute 11/08/2022 3.88  1.50 - 8.10 k/uL Final    Absolute Lymph # 11/08/2022 1.47  1.10 - 3.70 k/uL Final    Absolute Mono # 11/08/2022 0.40  0.10 - 1.20 k/uL Final    Absolute Eos # 11/08/2022 0.10  0.00 - 0.44 k/uL Final    Basophils Absolute 11/08/2022 <0.03 0.00 - 0.20 k/uL Final    Absolute Immature Granulocyte 2022 <0.03  0.00 - 0.30 k/uL Final       The patient was instructed on fetal kick counts and is encouraged to monitor every 8 hours. She was instructed that the baby should move at a minimum of ten times within one hour after a meal. The patient was instructed to lay down on her left side twenty minutes after eating and count movements for up to one hour with a target value of ten movements. She was instructed to notify the office if she did not make that target after two attempts or if after any attempt there was less than four movements. The patient reports that the fetal movement targets have been made Yes.  Testing:  BPP     Assessment:  1Denis Altamirano is a 28 y.o. female  2. [id number]  3. 35w3d    Patient Active Problem List    Diagnosis Date Noted    Hypokalemia 2022     Priority: Medium    Chronic HTN (meds) 01/15/2021     Labetalol 300mg TID      Hx cocaine use 01/15/2021     Last use  per patient report  UDS negative 1/15/21      H/O dilation and curettage (2019) 01/15/2021    Hx trichomonas 01/15/2021    COVID pos 1/15/21 01/15/2021        Diagnosis Orders   1. Chronic hypertension in pregnancy  CBC with Auto Differential    Comprehensive Metabolic Panel    Uric Acid    Protein / creatinine ratio, urine            Plan:  The patient will return to the office for her next visit in 1 weeks. See antepartum flow sheet. Visits will continue every 2 weeks in the third trimester. At 36 weeks will have GBS swab performed and begin weekly visits.  Testing Indicated: Yes  Scheduled with [facility] Office Staff - Pt notified:  Yes

## 2022-11-28 ENCOUNTER — TELEPHONE (OUTPATIENT)
Dept: OBGYN | Age: 32
End: 2022-11-28

## 2022-11-28 ENCOUNTER — PROCEDURE VISIT (OUTPATIENT)
Dept: OBGYN CLINIC | Age: 32
End: 2022-11-28
Payer: MEDICARE

## 2022-11-28 ENCOUNTER — HOSPITAL ENCOUNTER (OUTPATIENT)
Age: 32
Discharge: ANOTHER HOSPITAL | End: 2022-11-29
Attending: OBSTETRICS & GYNECOLOGY | Admitting: ADVANCED PRACTICE MIDWIFE
Payer: MEDICARE

## 2022-11-28 ENCOUNTER — HOSPITAL ENCOUNTER (OUTPATIENT)
Age: 32
Setting detail: SPECIMEN
Discharge: HOME | End: 2022-11-28

## 2022-11-28 ENCOUNTER — ROUTINE PRENATAL (OUTPATIENT)
Dept: OBGYN CLINIC | Age: 32
End: 2022-11-28
Payer: MEDICARE

## 2022-11-28 VITALS — BODY MASS INDEX: 34.21 KG/M2 | SYSTOLIC BLOOD PRESSURE: 148 MMHG | WEIGHT: 225 LBS | DIASTOLIC BLOOD PRESSURE: 102 MMHG

## 2022-11-28 VITALS — BODY MASS INDEX: 34.21 KG/M2 | DIASTOLIC BLOOD PRESSURE: 102 MMHG | SYSTOLIC BLOOD PRESSURE: 168 MMHG | WEIGHT: 225 LBS

## 2022-11-28 DIAGNOSIS — O10.919 CHRONIC HYPERTENSION IN PREGNANCY: Primary | ICD-10-CM

## 2022-11-28 DIAGNOSIS — Z3A.36 36 WEEKS GESTATION OF PREGNANCY: ICD-10-CM

## 2022-11-28 DIAGNOSIS — O10.919 CHRONIC HYPERTENSION IN PREGNANCY: ICD-10-CM

## 2022-11-28 DIAGNOSIS — O36.8130 DECREASED FETAL MOVEMENTS IN THIRD TRIMESTER, SINGLE OR UNSPECIFIED FETUS: ICD-10-CM

## 2022-11-28 LAB
ABSOLUTE EOS #: 0.07 K/UL (ref 0–0.44)
ABSOLUTE IMMATURE GRANULOCYTE: <0.03 K/UL (ref 0–0.3)
ABSOLUTE LYMPH #: 1.36 K/UL (ref 1.1–3.7)
ABSOLUTE MONO #: 0.52 K/UL (ref 0.1–1.2)
ALBUMIN SERPL-MCNC: 3.4 G/DL (ref 3.5–5.2)
ALBUMIN/GLOBULIN RATIO: 1.1 (ref 1–2.5)
ALP BLD-CCNC: 142 U/L (ref 35–104)
ALT SERPL-CCNC: 6 U/L (ref 5–33)
AMPHETAMINE SCREEN URINE: NEGATIVE
ANION GAP SERPL CALCULATED.3IONS-SCNC: 12 MMOL/L (ref 9–17)
AST SERPL-CCNC: 15 U/L
BARBITURATE SCREEN URINE: NEGATIVE
BASOPHILS # BLD: 0 % (ref 0–2)
BASOPHILS ABSOLUTE: <0.03 K/UL (ref 0–0.2)
BENZODIAZEPINE SCREEN, URINE: NEGATIVE
BILIRUB SERPL-MCNC: 0.5 MG/DL (ref 0.3–1.2)
BUN BLDV-MCNC: 5 MG/DL (ref 6–20)
BUN/CREAT BLD: 9 (ref 9–20)
BUPRENORPHINE URINE: NEGATIVE
CALCIUM SERPL-MCNC: 8.4 MG/DL (ref 8.6–10.4)
CANNABINOID SCREEN URINE: NEGATIVE
CHLORIDE BLD-SCNC: 101 MMOL/L (ref 98–107)
CO2: 24 MMOL/L (ref 20–31)
COCAINE METABOLITE, URINE: NEGATIVE
CREAT SERPL-MCNC: 0.56 MG/DL (ref 0.5–0.9)
CREATININE URINE: 138.5 MG/DL (ref 28–217)
EOSINOPHILS RELATIVE PERCENT: 1 % (ref 1–4)
GFR SERPL CREATININE-BSD FRML MDRD: >60 ML/MIN/1.73M2
GLUCOSE BLD-MCNC: 70 MG/DL (ref 70–99)
HCT VFR BLD CALC: 31 % (ref 36.3–47.1)
HEMOGLOBIN: 10.4 G/DL (ref 11.9–15.1)
IMMATURE GRANULOCYTES: 0 %
LACTATE DEHYDROGENASE: 190 U/L (ref 135–214)
LYMPHOCYTES # BLD: 22 % (ref 24–43)
MCH RBC QN AUTO: 28.4 PG (ref 25.2–33.5)
MCHC RBC AUTO-ENTMCNC: 33.5 G/DL (ref 28.4–34.8)
MCV RBC AUTO: 84.7 FL (ref 82.6–102.9)
METHADONE SCREEN, URINE: NEGATIVE
METHAMPHETAMINE, URINE: NEGATIVE
MONOCYTES # BLD: 9 % (ref 3–12)
NRBC AUTOMATED: 0 PER 100 WBC
OPIATES, URINE: NEGATIVE
OXYCODONE SCREEN URINE: NEGATIVE
PDW BLD-RTO: 14.5 % (ref 11.8–14.4)
PHENCYCLIDINE, URINE: NEGATIVE
PLATELET # BLD: 186 K/UL (ref 138–453)
PMV BLD AUTO: 11.7 FL (ref 8.1–13.5)
POTASSIUM SERPL-SCNC: 2.8 MMOL/L (ref 3.7–5.3)
PROPOXYPHENE, URINE: NEGATIVE
RBC # BLD: 3.66 M/UL (ref 3.95–5.11)
SEG NEUTROPHILS: 68 % (ref 36–65)
SEGMENTED NEUTROPHILS ABSOLUTE COUNT: 4.1 K/UL (ref 1.5–8.1)
SODIUM BLD-SCNC: 137 MMOL/L (ref 135–144)
TOTAL PROTEIN, URINE: 24 MG/DL
TOTAL PROTEIN: 6.4 G/DL (ref 6.4–8.3)
TRICYCLIC ANTIDEPRESSANTS, UR: NEGATIVE
URIC ACID: 4.7 MG/DL (ref 2.4–5.7)
URINE TOTAL PROTEIN CREATININE RATIO: 0.17 (ref 0–0.2)
WBC # BLD: 6.1 K/UL (ref 3.5–11.3)

## 2022-11-28 PROCEDURE — 80053 COMPREHEN METABOLIC PANEL: CPT

## 2022-11-28 PROCEDURE — G8417 CALC BMI ABV UP PARAM F/U: HCPCS | Performed by: ADVANCED PRACTICE MIDWIFE

## 2022-11-28 PROCEDURE — 85025 COMPLETE CBC W/AUTO DIFF WBC: CPT

## 2022-11-28 PROCEDURE — 6370000000 HC RX 637 (ALT 250 FOR IP): Performed by: OBSTETRICS & GYNECOLOGY

## 2022-11-28 PROCEDURE — G8427 DOCREV CUR MEDS BY ELIG CLIN: HCPCS | Performed by: ADVANCED PRACTICE MIDWIFE

## 2022-11-28 PROCEDURE — G8484 FLU IMMUNIZE NO ADMIN: HCPCS | Performed by: ADVANCED PRACTICE MIDWIFE

## 2022-11-28 PROCEDURE — 80306 DRUG TEST PRSMV INSTRMNT: CPT

## 2022-11-28 PROCEDURE — 99213 OFFICE O/P EST LOW 20 MIN: CPT | Performed by: ADVANCED PRACTICE MIDWIFE

## 2022-11-28 PROCEDURE — 84156 ASSAY OF PROTEIN URINE: CPT

## 2022-11-28 PROCEDURE — 2500000003 HC RX 250 WO HCPCS: Performed by: OBSTETRICS & GYNECOLOGY

## 2022-11-28 PROCEDURE — 1036F TOBACCO NON-USER: CPT | Performed by: ADVANCED PRACTICE MIDWIFE

## 2022-11-28 PROCEDURE — 59025 FETAL NON-STRESS TEST: CPT | Performed by: ADVANCED PRACTICE MIDWIFE

## 2022-11-28 PROCEDURE — 99222 1ST HOSP IP/OBS MODERATE 55: CPT | Performed by: OBSTETRICS & GYNECOLOGY

## 2022-11-28 PROCEDURE — 82570 ASSAY OF URINE CREATININE: CPT

## 2022-11-28 PROCEDURE — 84550 ASSAY OF BLOOD/URIC ACID: CPT

## 2022-11-28 PROCEDURE — 6360000002 HC RX W HCPCS: Performed by: OBSTETRICS & GYNECOLOGY

## 2022-11-28 PROCEDURE — 83615 LACTATE (LD) (LDH) ENZYME: CPT

## 2022-11-28 PROCEDURE — 2580000003 HC RX 258: Performed by: OBSTETRICS & GYNECOLOGY

## 2022-11-28 RX ORDER — SODIUM CHLORIDE, SODIUM LACTATE, POTASSIUM CHLORIDE, CALCIUM CHLORIDE 600; 310; 30; 20 MG/100ML; MG/100ML; MG/100ML; MG/100ML
INJECTION, SOLUTION INTRAVENOUS CONTINUOUS
Status: DISCONTINUED | OUTPATIENT
Start: 2022-11-28 | End: 2022-11-29 | Stop reason: HOSPADM

## 2022-11-28 RX ORDER — TRISODIUM CITRATE DIHYDRATE AND CITRIC ACID MONOHYDRATE 500; 334 MG/5ML; MG/5ML
30 SOLUTION ORAL ONCE
Status: DISCONTINUED | OUTPATIENT
Start: 2022-11-28 | End: 2022-11-29 | Stop reason: HOSPADM

## 2022-11-28 RX ORDER — SODIUM CHLORIDE 0.9 % (FLUSH) 0.9 %
10 SYRINGE (ML) INJECTION PRN
OUTPATIENT
Start: 2022-11-28

## 2022-11-28 RX ORDER — LABETALOL 100 MG/1
400 TABLET, FILM COATED ORAL EVERY 8 HOURS SCHEDULED
Status: DISCONTINUED | OUTPATIENT
Start: 2022-11-28 | End: 2022-11-29 | Stop reason: HOSPADM

## 2022-11-28 RX ORDER — LABETALOL HYDROCHLORIDE 5 MG/ML
80 INJECTION, SOLUTION INTRAVENOUS ONCE
Status: COMPLETED | OUTPATIENT
Start: 2022-11-28 | End: 2022-11-28

## 2022-11-28 RX ORDER — SODIUM CHLORIDE, SODIUM LACTATE, POTASSIUM CHLORIDE, CALCIUM CHLORIDE 600; 310; 30; 20 MG/100ML; MG/100ML; MG/100ML; MG/100ML
INJECTION, SOLUTION INTRAVENOUS CONTINUOUS
OUTPATIENT
Start: 2022-11-28

## 2022-11-28 RX ORDER — SODIUM CHLORIDE, SODIUM LACTATE, POTASSIUM CHLORIDE, AND CALCIUM CHLORIDE .6; .31; .03; .02 G/100ML; G/100ML; G/100ML; G/100ML
1000 INJECTION, SOLUTION INTRAVENOUS ONCE
OUTPATIENT
Start: 2022-11-28 | End: 2022-11-28

## 2022-11-28 RX ORDER — SODIUM CHLORIDE 9 MG/ML
INJECTION, SOLUTION INTRAVENOUS PRN
OUTPATIENT
Start: 2022-11-28

## 2022-11-28 RX ORDER — MAGNESIUM SULFATE HEPTAHYDRATE 40 MG/ML
2000 INJECTION, SOLUTION INTRAVENOUS ONCE
Status: COMPLETED | OUTPATIENT
Start: 2022-11-28 | End: 2022-11-29

## 2022-11-28 RX ORDER — SODIUM CHLORIDE 9 MG/ML
INJECTION, SOLUTION INTRAVENOUS PRN
Status: DISCONTINUED | OUTPATIENT
Start: 2022-11-28 | End: 2022-11-29 | Stop reason: HOSPADM

## 2022-11-28 RX ORDER — CALCIUM GLUCONATE 94 MG/ML
1000 INJECTION, SOLUTION INTRAVENOUS PRN
Status: DISCONTINUED | OUTPATIENT
Start: 2022-11-28 | End: 2022-11-29 | Stop reason: HOSPADM

## 2022-11-28 RX ORDER — METOCLOPRAMIDE HYDROCHLORIDE 5 MG/ML
10 INJECTION INTRAMUSCULAR; INTRAVENOUS ONCE
Status: DISCONTINUED | OUTPATIENT
Start: 2022-11-28 | End: 2022-11-29 | Stop reason: HOSPADM

## 2022-11-28 RX ORDER — MAGNESIUM SULFATE IN WATER 40 MG/ML
4000 INJECTION, SOLUTION INTRAVENOUS ONCE
Status: COMPLETED | OUTPATIENT
Start: 2022-11-28 | End: 2022-11-28

## 2022-11-28 RX ORDER — SODIUM CHLORIDE 0.9 % (FLUSH) 0.9 %
5-40 SYRINGE (ML) INJECTION PRN
Status: DISCONTINUED | OUTPATIENT
Start: 2022-11-28 | End: 2022-11-29 | Stop reason: HOSPADM

## 2022-11-28 RX ORDER — LABETALOL HYDROCHLORIDE 5 MG/ML
20 INJECTION, SOLUTION INTRAVENOUS ONCE
Status: COMPLETED | OUTPATIENT
Start: 2022-11-28 | End: 2022-11-28

## 2022-11-28 RX ORDER — LABETALOL HYDROCHLORIDE 5 MG/ML
40 INJECTION, SOLUTION INTRAVENOUS ONCE
Status: COMPLETED | OUTPATIENT
Start: 2022-11-28 | End: 2022-11-28

## 2022-11-28 RX ORDER — SODIUM CHLORIDE 0.9 % (FLUSH) 0.9 %
10 SYRINGE (ML) INJECTION EVERY 12 HOURS SCHEDULED
OUTPATIENT
Start: 2022-11-28

## 2022-11-28 RX ORDER — SODIUM CHLORIDE 0.9 % (FLUSH) 0.9 %
5-40 SYRINGE (ML) INJECTION EVERY 12 HOURS SCHEDULED
Status: DISCONTINUED | OUTPATIENT
Start: 2022-11-28 | End: 2022-11-29 | Stop reason: HOSPADM

## 2022-11-28 RX ORDER — POTASSIUM CHLORIDE 20 MEQ/1
40 TABLET, EXTENDED RELEASE ORAL 3 TIMES DAILY
Status: DISCONTINUED | OUTPATIENT
Start: 2022-11-28 | End: 2022-11-29 | Stop reason: HOSPADM

## 2022-11-28 RX ADMIN — SODIUM CHLORIDE, POTASSIUM CHLORIDE, SODIUM LACTATE AND CALCIUM CHLORIDE: 600; 310; 30; 20 INJECTION, SOLUTION INTRAVENOUS at 22:46

## 2022-11-28 RX ADMIN — LABETALOL HYDROCHLORIDE 20 MG: 5 INJECTION INTRAVENOUS at 21:45

## 2022-11-28 RX ADMIN — MAGNESIUM SULFATE HEPTAHYDRATE 2000 MG: 40 INJECTION, SOLUTION INTRAVENOUS at 23:55

## 2022-11-28 RX ADMIN — LABETALOL HYDROCHLORIDE 40 MG: 5 INJECTION INTRAVENOUS at 22:38

## 2022-11-28 RX ADMIN — LABETALOL HYDROCHLORIDE 400 MG: 100 TABLET, FILM COATED ORAL at 19:27

## 2022-11-28 RX ADMIN — MAGNESIUM SULFATE HEPTAHYDRATE 4000 MG: 40 INJECTION, SOLUTION INTRAVENOUS at 23:44

## 2022-11-28 RX ADMIN — LABETALOL HYDROCHLORIDE 80 MG: 5 INJECTION INTRAVENOUS at 23:13

## 2022-11-28 NOTE — PROGRESS NOTES
Yazmin Perera is a 28 y.o. female 36w2d      The patient was seen and evaluated. There was decreased fetal movements last few days. No contractions or leakage of fluid. Signs and symptoms of labor were reviewed. The S/S of Pre-Eclampsia were reviewed with the patient in detail. She is to report any of these if they occur. She currently complains of intermittent epigastric pain, nausea, dizzy/light headed spells with near syncope. Has had emesis a few times also. BP meds taken this morning at 10am.      Reflexes to bilateral lower extremities 2+ and no clonus present. No lower extremity edema. The patient was instructed on fetal kick counts and is encouraged to complete every 8 hours. She was instructed that the baby should move at a minimum of ten times within one hour after a meal. The patient was instructed to lay down on her left side twenty minutes after eating and count movements for up to one hour with a target value of ten movements. She was instructed to notify the office if she did not make that target after two attempts or if after any attempt there was less than four movements. The patient reports that the fetal movement targets have been made Yes. Allergies: Allergies as of 11/28/2022 - Fully Reviewed 11/28/2022   Allergen Reaction Noted    No known allergies  04/21/2022         Group Beta Strep collection was completed. Yes  GBS Results:   Hospital Outpatient Visit on 11/22/2022   Component Date Value Ref Range Status    Uric Acid 11/22/2022 4.3  2.4 - 5.7 mg/dL Final    Glucose 11/22/2022 91  70 - 99 mg/dL Final    BUN 11/22/2022 5 (A)  6 - 20 mg/dL Final    Creatinine 11/22/2022 0.49 (A)  0.50 - 0.90 mg/dL Final    Est, Glom Filt Rate 11/22/2022 >60  >60 mL/min/1.73m2 Final    Comment:       Effective Oct 3, 2022        These results are not intended for use in patients <25years of age.         eGFR results are calculated without a race factor using the 2021 CKD-EPI equation. Careful clinical correlation is recommended, particularly when comparing to results   calculated using previous equations. The CKD-EPI equation is less accurate in patients with extremes of muscle mass, extra-renal   metabolism of creatine, excessive creatine ingestion, or following therapy that affects   renal tubular secretion.       Calcium 11/22/2022 8.4 (A)  8.6 - 10.4 mg/dL Final    Sodium 11/22/2022 141  135 - 144 mmol/L Final    Potassium 11/22/2022 3.1 (A)  3.7 - 5.3 mmol/L Final    Chloride 11/22/2022 106  98 - 107 mmol/L Final    CO2 11/22/2022 21  20 - 31 mmol/L Final    Anion Gap 11/22/2022 14  9 - 17 mmol/L Final    Alkaline Phosphatase 11/22/2022 142 (A)  35 - 104 U/L Final    ALT 11/22/2022 7  5 - 33 U/L Final    AST 11/22/2022 16  <32 U/L Final    Total Bilirubin 11/22/2022 0.4  0.3 - 1.2 mg/dL Final    Total Protein 11/22/2022 6.3 (A)  6.4 - 8.3 g/dL Final    Albumin 11/22/2022 3.2 (A)  3.5 - 5.2 g/dL Final    Albumin/Globulin Ratio 11/22/2022 1.0  1.0 - 2.5 Final    WBC 11/22/2022 5.7  3.5 - 11.3 k/uL Final    RBC 11/22/2022 3.94 (A)  3.95 - 5.11 m/uL Final    Hemoglobin 11/22/2022 10.9 (A)  11.9 - 15.1 g/dL Final    Hematocrit 11/22/2022 34.7 (A)  36.3 - 47.1 % Final    MCV 11/22/2022 88.1  82.6 - 102.9 fL Final    MCH 11/22/2022 27.7  25.2 - 33.5 pg Final    MCHC 11/22/2022 31.4  28.4 - 34.8 g/dL Final    RDW 11/22/2022 14.5 (A)  11.8 - 14.4 % Final    Platelets 09/04/7607 222  138 - 453 k/uL Final    MPV 11/22/2022 12.7  8.1 - 13.5 fL Final    NRBC Automated 11/22/2022 0.0  0.0 per 100 WBC Final    Seg Neutrophils 11/22/2022 63  36 - 65 % Final    Lymphocytes 11/22/2022 27  24 - 43 % Final    Monocytes 11/22/2022 9  3 - 12 % Final    Eosinophils % 11/22/2022 1  1 - 4 % Final    Basophils 11/22/2022 0  0 - 2 % Final    Immature Granulocytes 11/22/2022 0  0 % Final    Segs Absolute 11/22/2022 3.55  1.50 - 8.10 k/uL Final    Absolute Lymph # 11/22/2022 1. 55  1.10 - 3.70 k/uL Final    Absolute Mono # 11/22/2022 0.48  0.10 - 1.20 k/uL Final    Absolute Eos # 11/22/2022 0.06  0.00 - 0.44 k/uL Final    Basophils Absolute 11/22/2022 <0.03  0.00 - 0.20 k/uL Final    Absolute Immature Granulocyte 11/22/2022 <0.03  0.00 - 0.30 k/uL Final    RBC Morphology 11/22/2022 ANISOCYTOSIS PRESENT   Final    Total Protein, Urine 11/22/2022 45  mg/dL Final    No normal range established.  Creatinine, Ur 11/22/2022 210.4  28.0 - 217.0 mg/dL Final    Urine Total Protein Creatinine Rat* 11/22/2022 0.21 (A)  0.00 - 0.20 Final   Hospital Outpatient Visit on 11/10/2022   Component Date Value Ref Range Status    Total Protein, Urine 11/10/2022 16  mg/dL Final    No normal range established.  Creatinine, Ur 11/10/2022 68.7  28.0 - 217.0 mg/dL Final    Urine Total Protein Creatinine Rat* 11/10/2022 0.23 (A)  0.00 - 0.20 Final   Hospital Outpatient Visit on 11/08/2022   Component Date Value Ref Range Status    Uric Acid 11/08/2022 4.2  2.4 - 5.7 mg/dL Final    Glucose 11/08/2022 59 (A)  70 - 99 mg/dL Final    BUN 11/08/2022 4 (A)  6 - 20 mg/dL Final    Creatinine 11/08/2022 0.50  0.50 - 0.90 mg/dL Final    Est, Glom Filt Rate 11/08/2022 >60  >60 mL/min/1.73m2 Final    Comment:       Effective Oct 3, 2022        These results are not intended for use in patients <25years of age. eGFR results are calculated without a race factor using the 2021 CKD-EPI equation. Careful clinical correlation is recommended, particularly when comparing to results   calculated using previous equations. The CKD-EPI equation is less accurate in patients with extremes of muscle mass, extra-renal   metabolism of creatine, excessive creatine ingestion, or following therapy that affects   renal tubular secretion.       Calcium 11/08/2022 8.4 (A)  8.6 - 10.4 mg/dL Final    Sodium 11/08/2022 139  135 - 144 mmol/L Final    Potassium 11/08/2022 3.3 (A)  3.7 - 5.3 mmol/L Final    Chloride 11/08/2022 105  98 - 107 mmol/L Final    CO2 11/08/2022 22  20 - 31 mmol/L Final    Anion Gap 11/08/2022 12  9 - 17 mmol/L Final    Alkaline Phosphatase 11/08/2022 136 (A)  35 - 104 U/L Final    ALT 11/08/2022 8  5 - 33 U/L Final    AST 11/08/2022 15  <32 U/L Final    Total Bilirubin 11/08/2022 0.3  0.3 - 1.2 mg/dL Final    Total Protein 11/08/2022 6.6  6.4 - 8.3 g/dL Final    Albumin 11/08/2022 3.4 (A)  3.5 - 5.2 g/dL Final    Albumin/Globulin Ratio 11/08/2022 1.1  1.0 - 2.5 Final    WBC 11/08/2022 5.9  3.5 - 11.3 k/uL Final    RBC 11/08/2022 4.12  3.95 - 5.11 m/uL Final    Hemoglobin 11/08/2022 11.6 (A)  11.9 - 15.1 g/dL Final    Hematocrit 11/08/2022 36.6  36.3 - 47.1 % Final    MCV 11/08/2022 88.8  82.6 - 102.9 fL Final    MCH 11/08/2022 28.2  25.2 - 33.5 pg Final    MCHC 11/08/2022 31.7  28.4 - 34.8 g/dL Final    RDW 11/08/2022 14.3  11.8 - 14.4 % Final    Platelets 72/21/1024 266  138 - 453 k/uL Final    MPV 11/08/2022 12.2  8.1 - 13.5 fL Final    NRBC Automated 11/08/2022 0.0  0.0 per 100 WBC Final    Seg Neutrophils 11/08/2022 66 (A)  36 - 65 % Final    Lymphocytes 11/08/2022 25  24 - 43 % Final    Monocytes 11/08/2022 7  3 - 12 % Final    Eosinophils % 11/08/2022 2  1 - 4 % Final    Basophils 11/08/2022 0  0 - 2 % Final    Immature Granulocytes 11/08/2022 0  0 % Final    Segs Absolute 11/08/2022 3.88  1.50 - 8.10 k/uL Final    Absolute Lymph # 11/08/2022 1.47  1.10 - 3.70 k/uL Final    Absolute Mono # 11/08/2022 0.40  0.10 - 1.20 k/uL Final    Absolute Eos # 11/08/2022 0.10  0.00 - 0.44 k/uL Final    Basophils Absolute 11/08/2022 <0.03  0.00 - 0.20 k/uL Final    Absolute Immature Granulocyte 11/08/2022 <0.03  0.00 - 0.30 k/uL Final       If not done prior in pregnancy or if had previous positive result in this pregnancy, GC/CT were collected. No.    The patient was counseled on the mandatory call ahead policy.  She has been instructed to call the office at anytime prior to going into the hospital so the on-call physician may direct her to the appropriate facility for care. Exceptions were reviewed including but not limited to: Decreased fetal movement, vaginal Bleeding or hemorrhage, trauma, readily expectant delivery, or any instance where she feels 911 should be utilized. The patient was counseled on Labor & Delivery.  Testing:  NST today      Assessment:  Krysten Bloom is a 28 y.o. female  2. [id number]  3. 36w2d    Patient Active Problem List    Diagnosis Date Noted    Hypokalemia 2022     Priority: Medium    Chronic HTN (meds) 01/15/2021     Overview Note:     Labetalol 300mg TID      Hx cocaine use 01/15/2021     Overview Note:     Last use  per patient report  UDS negative 1/15/21      H/O dilation and curettage (2019) 01/15/2021    Hx trichomonas 01/15/2021    COVID pos 1/15/21 01/15/2021        Diagnosis Orders   1. Chronic hypertension in pregnancy  Culture, Strep B Screen, Vaginal/Rectal      2. 36 weeks gestation of pregnancy        3. Decreased fetal movements in third trimester, single or unspecified fetus              Plan:   To OB for monitoring and possible delivery  Requested Dr Jose Daniel Bello to call office and discuss - she is currently in surgery

## 2022-11-28 NOTE — TELEPHONE ENCOUNTER
Called patient to inquire about why not arrived at hospital yet and reports that is waiting on her mother to arrive and that mother is on her way to patient house then can go to hospital.    Did also discuss case with Dr Lavonne Azar who will manage patient when arrives at hospital

## 2022-11-28 NOTE — PROGRESS NOTES
Roger Potter is here at [facility] for an NST due to chronic hypertension    FHT;        Baseline Heart Rate:  140        Accelerations:  present       Long Term Variability:  moderate       Decelerations:  absent         Contraction frequency: Uterine irritability      reactive    Plan: continue twice/wk monitoring

## 2022-11-28 NOTE — LETTER
[facility] and Delivery  [facility] [place]  [facility] [address] [place] [address]  Phone: [phone]    No name on file. November 29, 2022     Patient: Agustin Vazquez   YOB: 1990   Date of Visit: 11/28/2022       To Whom It May Concern:    Eneida Soto was seen and treated at our facility, starting on 11/28/22, for hypertension during pregnancy, and will be transferred up to [place] for delivery of infant. Her sister, Evan Muñoz, was present with her on our unit from 11/28/22 to 11/29/22. She will continue to be present with her sister throughout the day of the 29th. Please excuse her from any missed work. If you have any questions or concerns, please don't hesitate to call us at [phone].     Sincerely,        Alton Charles RN

## 2022-11-29 ENCOUNTER — HOSPITAL ENCOUNTER (INPATIENT)
Age: 32
LOS: 3 days | Discharge: HOME | End: 2022-12-02
Attending: OBSTETRICS & GYNECOLOGY | Admitting: OBSTETRICS & GYNECOLOGY
Payer: MEDICARE

## 2022-11-29 VITALS
HEIGHT: 68 IN | BODY MASS INDEX: 34.1 KG/M2 | SYSTOLIC BLOOD PRESSURE: 174 MMHG | TEMPERATURE: 98.2 F | RESPIRATION RATE: 18 BRPM | OXYGEN SATURATION: 98 % | DIASTOLIC BLOOD PRESSURE: 95 MMHG | WEIGHT: 225 LBS | HEART RATE: 86 BPM

## 2022-11-29 PROBLEM — O09.93 HIGH-RISK PREGNANCY IN THIRD TRIMESTER: Status: ACTIVE | Noted: 2022-11-29

## 2022-11-29 LAB
ABO/RH: NORMAL
ABSOLUTE EOS #: 0.06 K/UL (ref 0–0.44)
ABSOLUTE IMMATURE GRANULOCYTE: 0.03 K/UL (ref 0–0.3)
ABSOLUTE LYMPH #: 1.65 K/UL (ref 1.1–3.7)
ABSOLUTE MONO #: 0.51 K/UL (ref 0.1–1.2)
ALBUMIN SERPL-MCNC: 3.2 G/DL (ref 3.5–5.2)
ALBUMIN/GLOBULIN RATIO: 1.1 (ref 1–2.5)
ALP BLD-CCNC: 147 U/L (ref 35–104)
ALT SERPL-CCNC: 8 U/L (ref 5–33)
AMPHETAMINE SCREEN URINE: NEGATIVE
ANION GAP SERPL CALCULATED.3IONS-SCNC: 16 MMOL/L (ref 9–17)
ANTIBODY SCREEN: NEGATIVE
ARM BAND NUMBER: NORMAL
AST SERPL-CCNC: 15 U/L
BARBITURATE SCREEN URINE: NEGATIVE
BASOPHILS # BLD: 1 % (ref 0–2)
BASOPHILS ABSOLUTE: 0.03 K/UL (ref 0–0.2)
BENZODIAZEPINE SCREEN, URINE: NEGATIVE
BILIRUB SERPL-MCNC: 0.5 MG/DL (ref 0.3–1.2)
BUN BLDV-MCNC: 4 MG/DL (ref 6–20)
CALCIUM SERPL-MCNC: 7.5 MG/DL (ref 8.6–10.4)
CANNABINOID SCREEN URINE: NEGATIVE
CHLORIDE BLD-SCNC: 103 MMOL/L (ref 98–107)
CO2: 20 MMOL/L (ref 20–31)
COCAINE METABOLITE, URINE: NEGATIVE
CREAT SERPL-MCNC: 0.4 MG/DL (ref 0.5–0.9)
CREATININE URINE: 36 MG/DL (ref 28–217)
EOSINOPHILS RELATIVE PERCENT: 1 % (ref 1–4)
EXPIRATION DATE: NORMAL
FENTANYL URINE: NEGATIVE
GFR SERPL CREATININE-BSD FRML MDRD: >60 ML/MIN/1.73M2
GLUCOSE BLD-MCNC: 69 MG/DL (ref 70–99)
HCT VFR BLD CALC: 33.5 % (ref 36.3–47.1)
HEMOGLOBIN: 10.3 G/DL (ref 11.9–15.1)
IMMATURE GRANULOCYTES: 1 %
LYMPHOCYTES # BLD: 27 % (ref 24–43)
MAGNESIUM: 4 MG/DL (ref 1.6–2.6)
MCH RBC QN AUTO: 27.5 PG (ref 25.2–33.5)
MCHC RBC AUTO-ENTMCNC: 30.7 G/DL (ref 28.4–34.8)
MCV RBC AUTO: 89.6 FL (ref 82.6–102.9)
METHADONE SCREEN, URINE: NEGATIVE
MONOCYTES # BLD: 8 % (ref 3–12)
NRBC AUTOMATED: 0 PER 100 WBC
OPIATES, URINE: NEGATIVE
OXYCODONE SCREEN URINE: NEGATIVE
PDW BLD-RTO: 14.6 % (ref 11.8–14.4)
PHENCYCLIDINE, URINE: NEGATIVE
PLATELET # BLD: 203 K/UL (ref 138–453)
PMV BLD AUTO: 12.6 FL (ref 8.1–13.5)
POTASSIUM SERPL-SCNC: 2.6 MMOL/L (ref 3.7–5.3)
POTASSIUM SERPL-SCNC: 3.1 MMOL/L (ref 3.7–5.3)
RBC # BLD: 3.74 M/UL (ref 3.95–5.11)
RBC # BLD: ABNORMAL 10*6/UL
SEG NEUTROPHILS: 62 % (ref 36–65)
SEGMENTED NEUTROPHILS ABSOLUTE COUNT: 3.8 K/UL (ref 1.5–8.1)
SODIUM BLD-SCNC: 139 MMOL/L (ref 135–144)
T. PALLIDUM, IGG: NONREACTIVE
TEST INFORMATION: NORMAL
TOTAL PROTEIN, URINE: 12 MG/DL
TOTAL PROTEIN: 6 G/DL (ref 6.4–8.3)
URINE TOTAL PROTEIN CREATININE RATIO: 0.33 (ref 0–0.2)
WBC # BLD: 6.1 K/UL (ref 3.5–11.3)

## 2022-11-29 PROCEDURE — 86850 RBC ANTIBODY SCREEN: CPT

## 2022-11-29 PROCEDURE — 86780 TREPONEMA PALLIDUM: CPT

## 2022-11-29 PROCEDURE — 2580000003 HC RX 258: Performed by: STUDENT IN AN ORGANIZED HEALTH CARE EDUCATION/TRAINING PROGRAM

## 2022-11-29 PROCEDURE — 6360000002 HC RX W HCPCS

## 2022-11-29 PROCEDURE — 86900 BLOOD TYPING SEROLOGIC ABO: CPT

## 2022-11-29 PROCEDURE — 80053 COMPREHEN METABOLIC PANEL: CPT

## 2022-11-29 PROCEDURE — 6360000002 HC RX W HCPCS: Performed by: STUDENT IN AN ORGANIZED HEALTH CARE EDUCATION/TRAINING PROGRAM

## 2022-11-29 PROCEDURE — 83735 ASSAY OF MAGNESIUM: CPT

## 2022-11-29 PROCEDURE — 84132 ASSAY OF SERUM POTASSIUM: CPT

## 2022-11-29 PROCEDURE — 2500000003 HC RX 250 WO HCPCS

## 2022-11-29 PROCEDURE — 84156 ASSAY OF PROTEIN URINE: CPT

## 2022-11-29 PROCEDURE — 86901 BLOOD TYPING SEROLOGIC RH(D): CPT

## 2022-11-29 PROCEDURE — 6360000002 HC RX W HCPCS: Performed by: OBSTETRICS & GYNECOLOGY

## 2022-11-29 PROCEDURE — 1220000000 HC SEMI PRIVATE OB R&B

## 2022-11-29 PROCEDURE — 85025 COMPLETE CBC W/AUTO DIFF WBC: CPT

## 2022-11-29 PROCEDURE — 82570 ASSAY OF URINE CREATININE: CPT

## 2022-11-29 PROCEDURE — 6370000000 HC RX 637 (ALT 250 FOR IP): Performed by: STUDENT IN AN ORGANIZED HEALTH CARE EDUCATION/TRAINING PROGRAM

## 2022-11-29 PROCEDURE — 80307 DRUG TEST PRSMV CHEM ANLYZR: CPT

## 2022-11-29 PROCEDURE — 36415 COLL VENOUS BLD VENIPUNCTURE: CPT

## 2022-11-29 RX ORDER — LABETALOL HYDROCHLORIDE 5 MG/ML
80 INJECTION, SOLUTION INTRAVENOUS
Status: DISCONTINUED | OUTPATIENT
Start: 2022-11-29 | End: 2022-11-30

## 2022-11-29 RX ORDER — SODIUM CHLORIDE 0.9 % (FLUSH) 0.9 %
10 SYRINGE (ML) INJECTION PRN
Status: DISCONTINUED | OUTPATIENT
Start: 2022-11-29 | End: 2022-11-30

## 2022-11-29 RX ORDER — PROCHLORPERAZINE EDISYLATE 5 MG/ML
10 INJECTION INTRAMUSCULAR; INTRAVENOUS ONCE
Status: COMPLETED | OUTPATIENT
Start: 2022-11-30 | End: 2022-11-29

## 2022-11-29 RX ORDER — POTASSIUM CHLORIDE 20 MEQ/1
40 TABLET, EXTENDED RELEASE ORAL PRN
Status: DISCONTINUED | OUTPATIENT
Start: 2022-11-29 | End: 2022-12-02 | Stop reason: HOSPADM

## 2022-11-29 RX ORDER — SODIUM CHLORIDE, SODIUM LACTATE, POTASSIUM CHLORIDE, AND CALCIUM CHLORIDE .6; .31; .03; .02 G/100ML; G/100ML; G/100ML; G/100ML
1000 INJECTION, SOLUTION INTRAVENOUS PRN
Status: DISCONTINUED | OUTPATIENT
Start: 2022-11-29 | End: 2022-11-30 | Stop reason: HOSPADM

## 2022-11-29 RX ORDER — SODIUM CHLORIDE, SODIUM LACTATE, POTASSIUM CHLORIDE, CALCIUM CHLORIDE 600; 310; 30; 20 MG/100ML; MG/100ML; MG/100ML; MG/100ML
INJECTION, SOLUTION INTRAVENOUS CONTINUOUS
Status: DISCONTINUED | OUTPATIENT
Start: 2022-11-29 | End: 2022-11-29

## 2022-11-29 RX ORDER — TRANEXAMIC ACID 10 MG/ML
1000 INJECTION, SOLUTION INTRAVENOUS
Status: DISCONTINUED | OUTPATIENT
Start: 2022-11-29 | End: 2022-11-30

## 2022-11-29 RX ORDER — CALCIUM GLUCONATE 94 MG/ML
1000 INJECTION, SOLUTION INTRAVENOUS PRN
Status: DISCONTINUED | OUTPATIENT
Start: 2022-11-29 | End: 2022-11-30

## 2022-11-29 RX ORDER — SODIUM CHLORIDE, SODIUM LACTATE, POTASSIUM CHLORIDE, CALCIUM CHLORIDE 600; 310; 30; 20 MG/100ML; MG/100ML; MG/100ML; MG/100ML
INJECTION, SOLUTION INTRAVENOUS CONTINUOUS
Status: DISCONTINUED | OUTPATIENT
Start: 2022-11-29 | End: 2022-11-30

## 2022-11-29 RX ORDER — SODIUM CHLORIDE 0.9 % (FLUSH) 0.9 %
5-40 SYRINGE (ML) INJECTION EVERY 12 HOURS SCHEDULED
Status: DISCONTINUED | OUTPATIENT
Start: 2022-11-29 | End: 2022-11-30 | Stop reason: HOSPADM

## 2022-11-29 RX ORDER — SODIUM CHLORIDE 0.9 % (FLUSH) 0.9 %
10 SYRINGE (ML) INJECTION EVERY 12 HOURS SCHEDULED
Status: DISCONTINUED | OUTPATIENT
Start: 2022-11-29 | End: 2022-11-30

## 2022-11-29 RX ORDER — SODIUM CHLORIDE 0.9 % (FLUSH) 0.9 %
5-40 SYRINGE (ML) INJECTION PRN
Status: DISCONTINUED | OUTPATIENT
Start: 2022-11-29 | End: 2022-11-30 | Stop reason: HOSPADM

## 2022-11-29 RX ORDER — LABETALOL 200 MG/1
600 TABLET, FILM COATED ORAL EVERY 8 HOURS
Status: DISCONTINUED | OUTPATIENT
Start: 2022-11-29 | End: 2022-11-30

## 2022-11-29 RX ORDER — LABETALOL 200 MG/1
600 TABLET, FILM COATED ORAL 3 TIMES DAILY
Status: DISCONTINUED | OUTPATIENT
Start: 2022-11-29 | End: 2022-11-29

## 2022-11-29 RX ORDER — POTASSIUM CHLORIDE 7.45 MG/ML
10 INJECTION INTRAVENOUS PRN
Status: DISCONTINUED | OUTPATIENT
Start: 2022-11-29 | End: 2022-12-02 | Stop reason: HOSPADM

## 2022-11-29 RX ORDER — MORPHINE SULFATE 10 MG/ML
10 INJECTION, SOLUTION INTRAMUSCULAR; INTRAVENOUS ONCE
Status: COMPLETED | OUTPATIENT
Start: 2022-11-30 | End: 2022-11-29

## 2022-11-29 RX ORDER — LABETALOL HYDROCHLORIDE 5 MG/ML
20 INJECTION, SOLUTION INTRAVENOUS
Status: DISCONTINUED | OUTPATIENT
Start: 2022-11-29 | End: 2022-11-30

## 2022-11-29 RX ORDER — MORPHINE SULFATE 10 MG/ML
10 INJECTION, SOLUTION INTRAMUSCULAR; INTRAVENOUS ONCE
Status: DISCONTINUED | OUTPATIENT
Start: 2022-11-30 | End: 2022-11-29

## 2022-11-29 RX ORDER — PROCHLORPERAZINE EDISYLATE 5 MG/ML
10 INJECTION INTRAMUSCULAR; INTRAVENOUS ONCE
Status: DISCONTINUED | OUTPATIENT
Start: 2022-11-30 | End: 2022-11-29

## 2022-11-29 RX ORDER — MISOPROSTOL 100 UG/1
800 TABLET ORAL PRN
Status: DISCONTINUED | OUTPATIENT
Start: 2022-11-29 | End: 2022-11-30 | Stop reason: HOSPADM

## 2022-11-29 RX ORDER — CARBOPROST TROMETHAMINE 250 UG/ML
250 INJECTION, SOLUTION INTRAMUSCULAR PRN
Status: DISCONTINUED | OUTPATIENT
Start: 2022-11-29 | End: 2022-11-30 | Stop reason: HOSPADM

## 2022-11-29 RX ORDER — ONDANSETRON 2 MG/ML
4 INJECTION INTRAMUSCULAR; INTRAVENOUS EVERY 6 HOURS PRN
Status: DISCONTINUED | OUTPATIENT
Start: 2022-11-29 | End: 2022-11-30 | Stop reason: HOSPADM

## 2022-11-29 RX ORDER — MORPHINE SULFATE 10 MG/ML
6 INJECTION, SOLUTION INTRAMUSCULAR; INTRAVENOUS ONCE
Status: COMPLETED | OUTPATIENT
Start: 2022-11-29 | End: 2022-11-29

## 2022-11-29 RX ORDER — LABETALOL HYDROCHLORIDE 5 MG/ML
20 INJECTION, SOLUTION INTRAVENOUS ONCE
Status: COMPLETED | OUTPATIENT
Start: 2022-11-29 | End: 2022-11-29

## 2022-11-29 RX ORDER — DIPHENHYDRAMINE HCL 25 MG
25 TABLET ORAL EVERY 4 HOURS PRN
Status: DISCONTINUED | OUTPATIENT
Start: 2022-11-29 | End: 2022-11-30 | Stop reason: HOSPADM

## 2022-11-29 RX ORDER — SODIUM CHLORIDE 9 MG/ML
INJECTION, SOLUTION INTRAVENOUS PRN
Status: DISCONTINUED | OUTPATIENT
Start: 2022-11-29 | End: 2022-11-30

## 2022-11-29 RX ORDER — PROCHLORPERAZINE EDISYLATE 5 MG/ML
10 INJECTION INTRAMUSCULAR; INTRAVENOUS ONCE
Status: COMPLETED | OUTPATIENT
Start: 2022-11-29 | End: 2022-11-29

## 2022-11-29 RX ORDER — LABETALOL HYDROCHLORIDE 5 MG/ML
40 INJECTION, SOLUTION INTRAVENOUS
Status: DISCONTINUED | OUTPATIENT
Start: 2022-11-29 | End: 2022-11-30

## 2022-11-29 RX ORDER — HYDRALAZINE HYDROCHLORIDE 20 MG/ML
10 INJECTION INTRAMUSCULAR; INTRAVENOUS
Status: DISCONTINUED | OUTPATIENT
Start: 2022-11-29 | End: 2022-11-30

## 2022-11-29 RX ORDER — SODIUM CHLORIDE, SODIUM LACTATE, POTASSIUM CHLORIDE, AND CALCIUM CHLORIDE .6; .31; .03; .02 G/100ML; G/100ML; G/100ML; G/100ML
500 INJECTION, SOLUTION INTRAVENOUS PRN
Status: DISCONTINUED | OUTPATIENT
Start: 2022-11-29 | End: 2022-11-30 | Stop reason: HOSPADM

## 2022-11-29 RX ORDER — MORPHINE SULFATE 4 MG/ML
4 INJECTION, SOLUTION INTRAMUSCULAR; INTRAVENOUS ONCE
Status: COMPLETED | OUTPATIENT
Start: 2022-11-29 | End: 2022-11-29

## 2022-11-29 RX ORDER — METHYLERGONOVINE MALEATE 0.2 MG/ML
200 INJECTION INTRAVENOUS PRN
Status: DISCONTINUED | OUTPATIENT
Start: 2022-11-29 | End: 2022-11-29

## 2022-11-29 RX ORDER — ACETAMINOPHEN 500 MG
1000 TABLET ORAL EVERY 6 HOURS PRN
Status: DISCONTINUED | OUTPATIENT
Start: 2022-11-29 | End: 2022-11-30 | Stop reason: HOSPADM

## 2022-11-29 RX ORDER — LABETALOL 200 MG/1
200 TABLET, FILM COATED ORAL ONCE
Status: COMPLETED | OUTPATIENT
Start: 2022-11-29 | End: 2022-11-29

## 2022-11-29 RX ORDER — SODIUM CHLORIDE 9 MG/ML
25 INJECTION, SOLUTION INTRAVENOUS PRN
Status: DISCONTINUED | OUTPATIENT
Start: 2022-11-29 | End: 2022-11-30 | Stop reason: HOSPADM

## 2022-11-29 RX ADMIN — MORPHINE SULFATE 4 MG: 4 INJECTION INTRAVENOUS at 10:55

## 2022-11-29 RX ADMIN — DEXTROSE MONOHYDRATE 5 MILLION UNITS: 5 INJECTION INTRAVENOUS at 05:28

## 2022-11-29 RX ADMIN — MORPHINE SULFATE 10 MG: 10 INJECTION INTRAVENOUS at 23:51

## 2022-11-29 RX ADMIN — POTASSIUM CHLORIDE 10 MEQ: 7.46 INJECTION, SOLUTION INTRAVENOUS at 13:07

## 2022-11-29 RX ADMIN — Medication 2.5 MILLION UNITS: at 08:39

## 2022-11-29 RX ADMIN — SODIUM CHLORIDE, POTASSIUM CHLORIDE, SODIUM LACTATE AND CALCIUM CHLORIDE: 600; 310; 30; 20 INJECTION, SOLUTION INTRAVENOUS at 08:00

## 2022-11-29 RX ADMIN — PROCHLORPERAZINE EDISYLATE 10 MG: 5 INJECTION INTRAMUSCULAR; INTRAVENOUS at 10:53

## 2022-11-29 RX ADMIN — POTASSIUM CHLORIDE 10 MEQ: 7.46 INJECTION, SOLUTION INTRAVENOUS at 11:59

## 2022-11-29 RX ADMIN — PROCHLORPERAZINE EDISYLATE 10 MG: 5 INJECTION INTRAMUSCULAR; INTRAVENOUS at 23:51

## 2022-11-29 RX ADMIN — SODIUM CHLORIDE, POTASSIUM CHLORIDE, SODIUM LACTATE AND CALCIUM CHLORIDE: 600; 310; 30; 20 INJECTION, SOLUTION INTRAVENOUS at 05:23

## 2022-11-29 RX ADMIN — MAGNESIUM SULFATE HEPTAHYDRATE 1000 MG/HR: 40 INJECTION, SOLUTION INTRAVENOUS at 00:07

## 2022-11-29 RX ADMIN — Medication 2.5 MILLION UNITS: at 16:31

## 2022-11-29 RX ADMIN — SODIUM CHLORIDE, PRESERVATIVE FREE 10 ML: 5 INJECTION INTRAVENOUS at 22:58

## 2022-11-29 RX ADMIN — MAGNESIUM SULFATE HEPTAHYDRATE 2000 MG/HR: 40 INJECTION, SOLUTION INTRAVENOUS at 11:50

## 2022-11-29 RX ADMIN — LABETALOL HYDROCHLORIDE 600 MG: 200 TABLET, FILM COATED ORAL at 09:11

## 2022-11-29 RX ADMIN — POTASSIUM CHLORIDE 40 MEQ: 1500 TABLET, EXTENDED RELEASE ORAL at 17:42

## 2022-11-29 RX ADMIN — MAGNESIUM SULFATE HEPTAHYDRATE 2000 MG/HR: 40 INJECTION, SOLUTION INTRAVENOUS at 21:25

## 2022-11-29 RX ADMIN — LABETALOL HYDROCHLORIDE 600 MG: 200 TABLET, FILM COATED ORAL at 15:37

## 2022-11-29 RX ADMIN — POTASSIUM CHLORIDE 10 MEQ: 7.46 INJECTION, SOLUTION INTRAVENOUS at 14:16

## 2022-11-29 RX ADMIN — POTASSIUM CHLORIDE 10 MEQ: 7.46 INJECTION, SOLUTION INTRAVENOUS at 10:36

## 2022-11-29 RX ADMIN — POTASSIUM CHLORIDE 10 MEQ: 7.46 INJECTION, SOLUTION INTRAVENOUS at 08:01

## 2022-11-29 RX ADMIN — SODIUM CHLORIDE, PRESERVATIVE FREE 10 ML: 5 INJECTION INTRAVENOUS at 09:12

## 2022-11-29 RX ADMIN — MAGNESIUM SULFATE HEPTAHYDRATE 2000 MG/HR: 40 INJECTION, SOLUTION INTRAVENOUS at 05:28

## 2022-11-29 RX ADMIN — LABETALOL HYDROCHLORIDE 200 MG: 200 TABLET, FILM COATED ORAL at 05:31

## 2022-11-29 RX ADMIN — SODIUM CHLORIDE, POTASSIUM CHLORIDE, SODIUM LACTATE AND CALCIUM CHLORIDE: 600; 310; 30; 20 INJECTION, SOLUTION INTRAVENOUS at 09:13

## 2022-11-29 RX ADMIN — Medication 2.5 MILLION UNITS: at 12:20

## 2022-11-29 RX ADMIN — Medication 1 MILLI-UNITS/MIN: at 06:04

## 2022-11-29 RX ADMIN — Medication 20 MG: at 13:23

## 2022-11-29 RX ADMIN — MORPHINE SULFATE 6 MG: 10 INJECTION INTRAVENOUS at 10:52

## 2022-11-29 RX ADMIN — POTASSIUM CHLORIDE 10 MEQ: 7.46 INJECTION, SOLUTION INTRAVENOUS at 09:18

## 2022-11-29 RX ADMIN — LABETALOL HYDROCHLORIDE 600 MG: 200 TABLET, FILM COATED ORAL at 23:50

## 2022-11-29 RX ADMIN — Medication 2.5 MILLION UNITS: at 20:06

## 2022-11-29 ASSESSMENT — PAIN DESCRIPTION - DESCRIPTORS: DESCRIPTORS: CRAMPING

## 2022-11-29 ASSESSMENT — PAIN SCALES - GENERAL
PAINLEVEL_OUTOF10: 9
PAINLEVEL_OUTOF10: 3

## 2022-11-29 ASSESSMENT — PAIN DESCRIPTION - LOCATION: LOCATION: ABDOMEN

## 2022-11-29 NOTE — PROGRESS NOTES
Obstetric/Gynecology Resident Interval Note    Patient with severe range BP. She was given labetalol 20 IV x1. Plan to repeat BP in 10 minutes. Vitals:    11/29/22 1100 11/29/22 1200 11/29/22 1300 11/29/22 1315   BP: (!) 144/99 (!) 140/93 (!) 174/110 (!) 168/97   Pulse: 96 86 87 84   Resp: 17 16 17    Temp:  97.7 °F (36.5 °C)     TempSrc:  Oral     SpO2: 97% 96% 95% 94%     Will update Dr. Letty Harkins and senior resident.  Continue to monitor closely     Ines Gusman MD  OB/GYN Resident, PGY2  [facility]  11/29/2022, 1:19 PM

## 2022-11-29 NOTE — PROGRESS NOTES
Obstetric/Gynecology Resident Interval Note    Repeat potassium at 3.1. Trending upwards however still low. Patient has remained asymptomatic. Will continue with replacement. Attending updated.     Connie Armas DO  OB/GYN Resident, [facility]  11/29/2022, 5:05 PM

## 2022-11-29 NOTE — H&P
OBSTETRICAL HISTORY [facility]    Date: 2022       Time: 4:54 AM   Patient Name: Irving Umana     Patient : 1990  Room/Bed: 0706/0706-01    Admission Date/Time: 2022  4:22 AM      CC: Transfer for IOL  cHTN w/ CHARLES w/ SF      HPI: Irving Umana is a 28 y.o. [id number] at 36w3d who presents as a transfer from [facility] for difficult to control severe range blood pressures refractory to multiple IV antihypertensives. Patient states she was seen today at her prenatal appointment and was sent to the hospital for elevated and severe range blood pressures. Patient states since arriving to the hospital she has received multiple doses of medication and her blood pressures have been difficult to control. At [place] the decision was made to administer a 6 g bolus of magnesium sulfate for seizure prophylaxis and to transfer the patient to the [place] [address] for further management. The patient has been taking labetalol 400 3 times daily and Procardia 10 mg instant release 3 times daily for blood pressure control. Patient states she did take her labetalol today at 10 AM.  Upon chart review it appears that patient received IV labetalol 20, 40, 80.of note the patient had an indicated  delivery in her prior pregnancy for severe range blood pressures. Patient states today she also began having contractions that have not increased in duration and frequency. Patient has had multiple cervical exams in the last day and was noted to be 3 cm dilated. The patient reports fetal movement is present, denies contractions, denies loss of fluid, denies vaginal bleeding. Patient denies any headache, visual changes, difficulty breathing, RUQ pain, N/V, F/C, and pain/swelling in lower extremities. Denies any dysuria or vaginal discharge. DATING:  LMP: Patient's last menstrual period was 2022 (exact date).   Estimated Date of Delivery: 22   Based on: LMP    PREGNANCY RISK FACTORS:  Patient Active Problem List   Diagnosis    Chronic HTN (meds)    Hx cocaine use    H/O dilation and curettage (2019)    Hx trichomonas    COVID pos 1/15/21    Hypokalemia    Chronic hypertension affecting pregnancy    High-risk pregnancy in third trimester       REVIEW OF SYSTEMS:   Constitutional: negative fever, negative chills  HEENT: negative visual disturbances, negative headaches, negative dizziness  Breast: negative breast abnormalities, negative breast lumps, negative nipple discharge  Respiratory: negative dyspnea, negative cough, negative SOB  Cardiovascular: negative chest pain,  negative palpitations, negative arrhythmia, negative syncope   Gastrointestinal: negative abdominal pain, negative RUQ pain, negative N/V/D, negative constipation, negative bowel changes, negative heartburn   Genitourinary: negative pelvic pain, negative dysuria, negative hematuria, negative urinary incontinence, negative vaginal discharge  Dermatological: negative rash, negative pruritis  Hematologic: negative bruising  Immunologic/Lymphatic: negative recent illness, negative recent sick contact  Musculoskeletal: negative back pain, negative myalgias, negative arthralgias  Neurological:  negative migraines, negative seizures, negative weakness  Behavior/Psych: negative depression, negative anxiety, negative SI, negative HI    OBSTETRICAL HISTORY:   OB History    Para Term  AB Living   4 2 1 1 1 2   SAB IAB Ectopic Molar Multiple Live Births   1 0 0 0 0 2      # Outcome Date GA Lbr Eamon/2nd Weight Sex Delivery Anes PTL Lv   4 Current            3  21 36w1d 05:55 / 00:31 6 lb 3.7 oz (2.826 kg) F Vag-Spont EPI N [facility]      Complications: Dysfunctional Labor      Name: Alma Delia Cortes: 9  Apgar5: 9   2 SAB            1 Term 12 40w0d  7 lb 11 oz (3.487 kg) F Vag-Spont EPI  [facility]       PAST MEDICAL HISTORY:   has a past medical history of Anemia, Chlamydia, Cocaine abuse ([facility] [address] Depression, Hypertension, Mental disorder,  delivery, and Trichomonal vulvovaginitis. PAST SURGICAL HISTORY:   has a past surgical history that includes Dilation and curettage of uterus (2019). ALLERGIES:  is allergic to no known allergies. MEDICATIONS:  Prior to Admission medications    Medication Sig Start Date End Date Taking? Authorizing Provider   labetalol (NORMODYNE) 200 MG tablet Take 2 tablets by mouth 3 times daily Take 400mg by mouth in the morning, 400mg at noon, and 400mg at bedtime. 22   Tash Arciniega DO   potassium chloride (KLOR-CON M) 20 MEQ extended release tablet Take 2 tablets by mouth 3 times daily  Patient not taking: Reported on 2022   TK Mar NP   ferrous sulfate (IRON 325) 325 (65 Fe) MG tablet Take 1 tablet by mouth 2 times daily 22   TK Mar NP   NIFEdipine (PROCARDIA) 10 MG capsule Take 1 capsule by mouth 3 times daily 22   Julietta Fothergill, APRN - CNM   aspirin 81 MG chewable tablet Take 81 mg by mouth in the morning. Historical Provider, MD   Prenatal Vit-Fe Fumarate-FA (PRENATAL VITAMIN) 27-0.8 MG TABS Take 1 tablet by mouth daily 22   Julietta Fothergill, APRN - CNM       FAMILY HISTORY:  family history includes Diabetes in her brother, maternal grandmother, and maternal uncle; High Blood Pressure in her maternal grandmother; No Known Problems in her brother, father, maternal grandfather, mother, and sister. SOCIAL HISTORY:   reports that she has never smoked. She has never used smokeless tobacco. She reports that she does not currently use alcohol. She reports that she does not currently use drugs after having used the following drugs: Cocaine.     VITALS:  Vitals:    22 0433 22 0446   BP: (!) 171/97 (!) 154/94   Pulse: 76 80   Resp: 17    Temp: 98 °F (36.7 °C)    TempSrc: Oral    SpO2: 97%            PHYSICAL EXAM:  Fetal Heart Monitor:  Baseline Heart Rate 130, moderate variability, present accelerations, absent decelerations  [place]: contractions, regular 5-10    General appearance:  no apparent distress, alert and cooperative  HEENT: head atraumatic, normocephalic, trachea midline, moist mucous membranes   Neurologic:  oriented, normal speech, no focal findings or movement disorder noted  Lungs:  no increased work of breathing, normal chest wall rise bilaterally  Heart:  regular rate and rhythm   Abdomen:  soft, gravid, non-tender on palpation, no right upper quadrant tenderness, no CVA tenderness bilaterally, uterus non-tender, no signs of abruption and no signs of chorioamnionitis  Extremities:  no calf tenderness bilaterally, non-edematous bilaterally  Musculoskeletal: no gross abnormalities, range of motion appropriate for age   Psychiatric: mood appropriate, normal affect     Examination chaperoned by ,RN    Pelvic Exam:   Vulva: normal appearing vulva, no masses, tenderness or lesions, normal clitoris    Vagina: normal appearing urethral meatus, normal appearing vaginal mucosa with normal color and discharge, no lesions, no vaginal bleeding     Uterus: is gravid, normal size, shape, consistency and non-tender   Rectal Exam: not indicated     Cervix Check: 3 cm dilated, 60 % effaced, -1 station, mid position, soft consistency, Fetal Position: Cephalic (confirmed by ultrasound), Membranes intact    LIMITED BEDSIDE US:  Position: Cephalic  Placental Location: anterior  Fetal Heart Tones: Present  Fetal Movement: Present  Amniotic Fluid Index/Volume: MVP 2x2cm  Estimated Fetal Weight:  6 lbs 2oz    PRENATAL LAB RESULTS:   Blood Type/Rh: A pos  Antibody Screen: negative  Hemoglobin, Hematocrit, Platelets: 46.0 / 36 / 288  Rubella: immune  T.  Pallidum, IgG: non-reactive   Hep C: non-reactive   Hepatitis B Surface Antigen: non-reactive   HIV: non-reactive   Gonorrhea: negative  Chlamydia: negative  Urine culture: negative, date: 9/27/22    1 hour Glucose Tolerance Test: 109    Group B Strep: pending  Cystic Fibrosis Screen: carrier screening not completed  Sickle Cell Screen: carrier screening not completed  First Trimester Screen: not done  MSAFP/Multiple Markers: not done  Non-Invasive Prenatal Testing: low risk for aneuploidy  Anatomy US: anterior, female, 3VC    ASSESSMENT & PLAN:  Carrillo Durán is a 28 y.o. female [id number] at 36w3d IUP   - GBS pending / Rh positive / R immune   - Pen G for GBS prophylaxis d/t  status and GBS unknown   - VSS, BP elevated    IOL 2/2 cHTN w/ CHARLES w/ SF    - Patient presents as a transfer from [place] for blood pressure requiring multiple IV antihypertensives    - Denies additional s/s PreE at this time   - PreE labs ordered    - Admit to L&D  Dr. Naomie Giordano for IOL    - Magnesium 6g bolus given in [place] followed by 2/g hr. Plan to continue magnesium sulfate until 24h after delivery    - Neuro checks q4h    - Peters catheter in place - OK to discontinue with strict Is and Os, patient up with assistance   - GBS collected and pending at [address] [place] [address], T&S, T. Pall, UDS    - Informed consent for UDS obtained. - SVE: 3, 60, -1   - BSUS: Cephalic, EFW 6#2   - Patient declined celestone   - IV fluid - LR @75cc/hr   - Induction method: Pitocin   - Patient stable at this time. Patient OK to eat and drink before starting pitocin. Plan to administer additional 200mg of Labetalol and plan for labetalol 600 TID beginning 11/29 AM. Will increase BP medications as needed. Plan for AROM after 4 hours of Pen G and when appropriate.     Anemia    - Hgb on admission pending    - Asymptomatic currently    - Hgb in [place] 10.4     Hypokalemia    - Patient has had severe hypokalemia in this pregnancy and has been on K supplementation with little success   - 2.8 in [place]    - K replacement ordered   - CMP ordered   - Will also get baseline magnesium level     Hx iPTD (36w1d)   - Patient was induced for hypertensive disorder of pregnancy      Hx Cocaine use   - Patient reports last use     - UDS all negative in this pregnancy    - UDS collected on admission     Hx D&C ()    Hx Trichomonas   -     - Neg G/C in this pregnancy    - No Vaginitis swab seen in EMR     Depression    - Denies SI/HI    - Not currently on medication    - Will monitor closely for s/s PP depression     BMI 34      Patient Active Problem List    Diagnosis Date Noted    High-risk pregnancy in third trimester 2022     Priority: Medium    Chronic hypertension affecting pregnancy 2022     Priority: Medium    Hypokalemia 2022     Priority: Medium    Chronic HTN (meds) 01/15/2021     Labetalol 300mg TID      Hx cocaine use 01/15/2021     Last use  per patient report  UDS negative 1/15/21      H/O dilation and curettage (2019) 01/15/2021    Hx trichomonas 01/15/2021    COVID pos 1/15/21 01/15/2021       Plan discussed with on-call physician Dr. Tabitha Jay, who is agreeable. Risks, benefits, alternatives and possible complications have been discussed in detail with the patient. Admission, and post admission procedures and expectations were discussed in detail. All questions were answered.     Patient's primary ob/gyn provider: Dr. Malcom Lake     Steroids Given In This Pregnancy: no  Steroids given this admission: no    Gail Perera DO  Ob/Gyn Resident  2022, 4:54 AM

## 2022-11-29 NOTE — CARE COORDINATION
ANTEPARTUM NOTE    High-risk pregnancy in third trimester [O09.93]    Piper Setting was admitted to L&D on 11/29/2022 as a transfer from [facility] for IOL 2/2 cHTN with CHARLES and SF @ 36 3/7    OB GYN Provider: Dr. Ladonna Thomas    Will meet with patient after delivery to verify name/address/phone/insurance and discuss discharge planning. Anticipate DC home 2 nights after vaginal delivery or 4 nights after C/S delivery as long as hemodynamically stable.

## 2022-11-29 NOTE — PROGRESS NOTES
Labor Progress Note    Agustin Vazquez is a 28 y.o. female [id number] at 36w3d  The patient was seen and examined. Her pain is well controlled. She reports fetal movement is present, complains of contractions, denies loss of fluid, denies vaginal bleeding. Denies s/s of preeclampsia including headache vision changes, difficulty breathing, chest pain, RUQ pain or worsening swelling of extremities. During last exam patient was agreeable to Peters balloon after premedication. Patient has been medicated with morphine. Will proceed with placement of Peters balloon at this time. Vital Signs:  Vitals:    11/29/22 0800 11/29/22 0900 11/29/22 1000 11/29/22 1100   BP: (!) 139/90 137/78 135/88 (!) 144/99   Pulse: 94 99 (!) 101 96   Resp: 15 17 18 17   Temp: 98.4 °F (36.9 °C)      TempSrc: Oral      SpO2: 95% 95% 95% 97%         FHT:  125 , moderate variability, accelerations present, decelerations absent  Contractions: regular, every 4 minutes  Cervical Exam: 1 cm dilated, 50 effaced, -3 station  Pitocin: @ 6 mu/min    Physical Exam:  Chest: clear to auscultation bilaterally  Heart: RRR no murmur  Abdomen: soft, nontender, nondistended  Extremities: DTR normal Right: 2/4   Left: 2/4  Clonus: absent    Urine Output: 950mL in 7hr; Clear urine    Chaperone for Exam  Chaperone was present. Chaperone Mary ARTEAGA    Membranes: Intact  Scalp Electrode in place: absent  Intrauterine Pressure Catheter in Place: absent  Interventions: Peters balloon placed. Patient tolerated well    Assessment/Plan:  Agustin Vazquez is a 28 y.o. female [id number] at 36w3d admitted for IOL 2/2 cHTN with CHARLES with SF    - GBS unknown (pending)   - Pen G for GBS prophylaxis   - VSS, Afebrile    - IVF LR @ 75 cc/hr   - Pitocin @ 0600   - Peters balloon placed.  Out at 2330   - CEFM/ TOCO   - Diet: CLD  - Continue Magnesium Sulfate Treatment @ 2g/hr   - Mag checks Q 4 hours   - Strict Is and Os   - SCDs    Attending updated and in agreement with plan    Israel Estrada Alexis , [place]  Ob/Gyn Resident  11/29/2022, 11:40 AM

## 2022-11-29 NOTE — FLOWSHEET NOTE
11/29/22 1300 11/29/22 1315   Maternal Vitals (MEW-T)   BP (!) 174/110 (!) 168/97   Dr. Kassidy Odom updated, orders received

## 2022-11-29 NOTE — PROGRESS NOTES
Labor Progress Note  Resident Interval Magnesium Note    Rupesh Sandoval is a 28 y.o. female [id number] at 36w3d  The patient was seen and examined. The patient is resting comfortably. Her pain is well controlled. She reports fetal movement is present, complains of contractions, denies loss of fluid, denies vaginal bleeding. She denies headache, visual changes, abdominal pain in the right upper quadrant, nausea/vomiting, backache, and dysuria. She denies any shortness of breath or chest pain. She denies change in her extremities, regarding swelling.     Continuous Medications:    sodium chloride      sodium chloride      magnesium sulfate 2,000 mg/hr (11/29/22 0528)    oxytocin 6 idris-units/min (11/29/22 0830)    lactated ringers 25 mL/hr at 11/29/22 0913    lactated ringers 25 mL/hr at 11/29/22 0800     Vital Signs:  Vitals:    11/29/22 0700 11/29/22 0800 11/29/22 0900 11/29/22 1000   BP: (!) 140/91 (!) 139/90 137/78 135/88   Pulse: 99 94 99 (!) 101   Resp:  15 17    Temp:  98.4 °F (36.9 °C)     TempSrc:  Oral     SpO2: 94% 95% 95% 95%     Physical Exam:  FHT: 130, moderate variability, accelerations present, decelerations absent  Contractions: irregular    Chaperone for Intimate Exam: Chaperone was present for entire exam, Chaperone Name: Beny Hernandez RN  Cervical Exam: 1 cm dilated, 50 effaced, -3 station    Chest: clear to auscultation bilaterally  Heart: RRR no murmur  Abdomen: soft, nontender, gravid, no s/s chorio or abruption  Extremities: DTR normal Right: +2/4   Left: +2/4  Clonus: absent    Urine Output: 100ml /hr; Clear urine in the last 5 hours      Pitocin: @ 6 mu/min    Membranes: Intact  Scalp Electrode in place: absent  Intrauterine Pressure Catheter in Place: absent    Interventions: SVE    Labs:  Last Magnesium Level:   Lab Results   Component Value Date/Time    MG 4.0 11/29/2022 05:21 AM       BMP:    Recent Labs     11/28/22  1710 11/29/22  0521    139   K 2.8* 2.6*    103   CO2 24 20 BUN 5* 4*   CREATININE 0.56 0.40*   GLUCOSE 70 69*       Assessment/Plan:  Yoana Sandoval is a 28 y.o. female [id number] at 36w3d IUP   - GBS pending, Pen G for GBS prophylaxis   - Afebrile   - Continue Magnesium Sulfate Treatment 2g/hr, off @ 24 hours after delivery   - No Mag levels q6hrs per provider   - BPs intermittently elevated, non severe   - Patient denies any s/s PreE   - UOP adequate   - Continue Labetalol 600 TID   - Patient has not required any IV antihypertensives since admission at [place]   - Continue to monitor closely     IOL 2/2 chronic hypertension with superimposed preeclamspia with severe features   - SVE: 1/50/-3. Discussed cosme balloon with pitocin for induction. Patient amenable  - Continue pitocin per protocol.  Changed to Low dose pitocin    - Morphine/compazine ordered for cosme balloon placement   - Continue to monitor closely, plan for cosme balloon once analgesia given        Renea Fernández MD  Ob/Gyn Resident  11/29/2022, 10:09 AM

## 2022-11-29 NOTE — PROGRESS NOTES
Dr. Vu Barnett updated regarding current BP, to see what current plan is for patient regarding sectioning patient or transferring patient up to [facility]. V's. Dr. Vu Barnett in to speak to patient.

## 2022-11-29 NOTE — PROGRESS NOTES
Dr. Jose Rafael Kumari updated regarding current blood pressures. Order received and placed to give IV Labetalol 40mg per protocol.

## 2022-11-29 NOTE — PROGRESS NOTES
Patient would like to be transferred to [facility] after discussing with Dr. Fadia Ny. Writer calls [facility] to initiate transfer.

## 2022-11-29 NOTE — DISCHARGE SUMMARY
Obstetric Discharge Summary  [facility]    Patient Name: Marnie Ayers  Patient : 1990  Primary Care Physician: No primary care provider on file. Admit Date: 2022    Principal Diagnosis: IUP at 36w3d, admitted for IOL 2/2 cHTN w/ CHARLES w/ SF     Her pregnancy has been complicated by:   Patient Active Problem List   Diagnosis    Chronic HTN (meds)    Hx cocaine use    H/O dilation and curettage (2019)    Hx trichomonas    COVID pos 1/15/21    Hypokalemia    Chronic hypertension affecting pregnancy    High-risk pregnancy in third trimester    PLTCS w/ Mirena IUD insertion 22 F Apg 8/9 Wt 6#9       Infection Present?: No  Hospital Acquired: No    Surgical Operations & Procedures:  Analgesia: spinal  Delivery Type:  Delivery: See Labor and Delivery Summary   Laceration(s): Absent    Consultations: NICU, and Anesthesia    Pertinent Findings & Procedures:   Marnie Ayers is a 28 y.o. female [id number] at 36w3d admitted for IOL 2/2 cHTN w/ CHARLES w/ SF. Patient presented as a transfer from [facility] [place] for blood pressures that were requiring and refractory to IV anti-hypertensives. Decision was made to proceed with IOL. Patient declined celestone. She received: 6g magnesium bolus (in [place]) followed by 2g/hr for seizure prophylaxis, Potassium replacement ordered due to hypokalemia. For her induction she received  Pen G, cosme balloon, pitocin, morphine/compazine x2, , AROM (clr), Cytotec 25 PO x1. The patient made no cervical change in 18 hours and had been ruptured for 18 hours and on Pitocin for over 24 hours. Discussed ongoing induction of labor versus  section. Patient elected for  section. She received Ancef, Bicitra, Tylenol, Pepcid, TXA preoperatively. She delivered by primary low transverse  a Live Born infant on 22.        Information for the patient's :  Esther PIERRE [[id number]]   female   Birth Weight: 6 lb 9.5 oz (2.99 kg)     Apgars: 8 at 1 minute and 9 at 5 minutes. Postpartum course: normal.    POD#1: Hgb 9.7    Course of patient: uncomplicated    Discharge to: Home    Readmission planned: no     Recommendations on Discharge:     Medications:      Medication List        START taking these medications      acetaminophen 500 MG tablet  Commonly known as: TYLENOL  Take 2 tablets by mouth every 6 hours as needed for Pain     ibuprofen 600 MG tablet  Commonly known as: ADVIL;MOTRIN  Take 1 tablet by mouth every 6 hours as needed for Pain     ondansetron 4 MG disintegrating tablet  Commonly known as: ZOFRAN-ODT  Take 1 tablet by mouth 3 times daily as needed for Nausea or Vomiting     oxyCODONE 5 MG immediate release tablet  Commonly known as: Roxicodone  Take 1 tablet by mouth every 6 hours as needed for Pain for up to 5 days. Intended supply: 5 days. Take lowest dose possible to manage pain     sennosides-docusate sodium 8.6-50 MG tablet  Commonly known as: SENOKOT-S  Take 1 tablet by mouth daily for 14 days            CHANGE how you take these medications      * ferrous sulfate 325 (65 Fe) MG tablet  Commonly known as: IRON 325  Take 1 tablet by mouth 2 times daily  What changed: Another medication with the same name was added. Make sure you understand how and when to take each. * ferrous sulfate 325 (65 Fe) MG tablet  Commonly known as: IRON 325  Take 1 tablet by mouth 2 times daily  What changed: You were already taking a medication with the same name, and this prescription was added. Make sure you understand how and when to take each. labetalol 200 MG tablet  Commonly known as: NORMODYNE  Take 3 tablets by mouth 3 times daily Take 400mg by mouth in the morning, 400mg at noon, and 400mg at bedtime. What changed: how much to take           * This list has 2 medication(s) that are the same as other medications prescribed for you.  Read the directions carefully, and ask your doctor or other care provider to review them with you. CONTINUE taking these medications      potassium chloride 20 MEQ extended release tablet  Commonly known as: KLOR-CON M  Take 2 tablets by mouth 3 times daily     Prenatal Vitamin 27-0.8 MG Tabs  Take 1 tablet by mouth daily            STOP taking these medications      aspirin 81 MG chewable tablet     NIFEdipine 10 MG capsule  Commonly known as: Procardia               Where to Get Your Medications        These medications were sent to [address] [address]      Phone: [phone]   acetaminophen 500 MG tablet  ferrous sulfate 325 (65 Fe) MG tablet  ibuprofen 600 MG tablet  labetalol 200 MG tablet  ondansetron 4 MG disintegrating tablet  oxyCODONE 5 MG immediate release tablet  sennosides-docusate sodium 8.6-50 MG tablet         Activity: pelvic rest x 6 weeks, no driving on narcotics, no lifting greater than 15 lbs  Diet: regular diet  Follow up: 1 week for BP check    Condition on discharge: stable    Discharge date: 2022     Laurie Sahu DO  Ob/Gyn Resident    Comments:  Home care and follow-up care were reviewed. Pelvic rest, and birth control were reviewed. Signs and symptoms of mastitis and post partum depression were reviewed. The patient is to notify her physician if any of these occur. The patient was counseled on secondary smoke risks and the increased risk of sudden infant death syndrome and respiratory problems to her baby with exposure. She was counseled on various alternate recommendations to decrease the exposure to secondary smoke to her children.     Date: 2022  Time: 9:49 AM      Patient Name: Rupesh Sandoval  Patient : 1990  Room/Bed: 3396/1996-57  Admission Date/Time: 2022  4:22 AM        Attending Physician Statement  I have discussed the care of Rupesh Sandoval, including pertinent history and exam findings with the resident. I have reviewed and edited their note in the electronic medical record. The key elements of all parts of the encounter have been performed/reviewed by me . I agree with the assessment, plan and orders as documented by the resident. The level of care submitted represents to the best of my ability the care documented in the medical record today. GC Modifier. This service has been performed in part by a resident under the direction of a teaching physician.         Attending's Name:  Mily Lugo DO

## 2022-11-29 NOTE — PROGRESS NOTES
Dr. Ly Going updated regarding current blood pressures. Order received and placed to give IV 80mg Labetalol then start Magnesium Sulfate protocol starting with 6 gram bolus followed by maintenance dosing.

## 2022-11-29 NOTE — H&P
HISTORY AND PHYSICAL             Date: 2022        Patient Name: Mp Perez     YOB: 1990      Age:  28 y.o. Chief Complaint     Chief Complaint   Patient presents with    Hypertension           History Obtained From   patient    History of Present Illness   Pt sent from office with uncontrolled chronic hypertension; somewhat non-compliant with medication; noted to be 3 cm with irreg contractions    Past Medical History     Past Medical History:   Diagnosis Date    Anemia     Chlamydia     Cocaine abuse ([address]     last use     Depression     Hypertension     Mental disorder     anxiety, depression     delivery     IOL @ 36 wks    Trichomonal vulvovaginitis         Past Surgical History     Past Surgical History:   Procedure Laterality Date    DILATION AND CURETTAGE OF UTERUS  2019        Medications Prior to Admission     Prior to Admission medications    Medication Sig Start Date End Date Taking? Authorizing Provider   labetalol (NORMODYNE) 200 MG tablet Take 2 tablets by mouth 3 times daily Take 400mg by mouth in the morning, 400mg at noon, and 400mg at bedtime. 22   Albertina Fletcher DO   potassium chloride (KLOR-CON M) 20 MEQ extended release tablet Take 2 tablets by mouth 3 times daily 22   TK Chua NP   ferrous sulfate (IRON 325) 325 (65 Fe) MG tablet Take 1 tablet by mouth 2 times daily 22   TK Chua NP   NIFEdipine (PROCARDIA) 10 MG capsule Take 1 capsule by mouth 3 times daily 22   TK Sargent CNM   aspirin 81 MG chewable tablet Take 81 mg by mouth in the morning.     Historical Provider, MD   Prenatal Vit-Fe Fumarate-FA (PRENATAL VITAMIN) 27-0.8 MG TABS Take 1 tablet by mouth daily 22   TK Sargent CNM        Allergies   No known allergies    Social History     Social History       Tobacco History       Smoking Status  Never      Smokeless Tobacco Use  Never              Alcohol History       Alcohol Use Status  Not Currently              Drug Use       Drug Use Status  Not Currently Types  Cocaine Comment  Last used 2018              Sexual Activity       Sexually Active  Yes Partners  Male                    Family History     Family History   Problem Relation Age of Onset    Diabetes Brother     Diabetes Maternal Uncle     Diabetes Maternal Grandmother     High Blood Pressure Maternal Grandmother     No Known Problems Maternal Grandfather     No Known Problems Father     No Known Problems Mother     No Known Problems Sister     No Known Problems Brother        Review of Systems   Review of Systems   Constitutional:  Negative for chills and fever. Genitourinary:  Positive for pelvic pain (occ ctx). Negative for dysuria and vaginal discharge. Physical Exam   BP (!) 186/98   Pulse 74   Temp 98.3 °F (36.8 °C) (Oral)   Resp 16   Ht 5' 8\" (1.727 m)   Wt 225 lb (102.1 kg)   LMP 03/19/2022 (Exact Date)   BMI 34.21 kg/m²     Physical Exam  Constitutional:       Appearance: Normal appearance. HENT:      Head: Normocephalic and atraumatic. Eyes:      Extraocular Movements: Extraocular movements intact. Pupils: Pupils are equal, round, and reactive to light. Cardiovascular:      Rate and Rhythm: Normal rate. Pulmonary:      Effort: Pulmonary effort is normal.   Musculoskeletal:         General: Normal range of motion. Cervical back: Normal range of motion. Skin:     General: Skin is warm and dry. Neurological:      Mental Status: She is alert and oriented to person, place, and time. Psychiatric:         Mood and Affect: Mood normal.         Behavior: Behavior normal.         Thought Content:  Thought content normal.         Judgment: Judgment normal.       Labs      Recent Results (from the past 24 hour(s))   Lactate Dehydrogenase    Collection Time: 11/28/22  5:10 PM   Result Value Ref Range     135 - 214 U/L   Comprehensive Metabolic Panel    Collection Amphetamine Screen, Ur NEGATIVE NEGATIVE    Barbiturate Screen, Ur NEGATIVE NEGATIVE    Benzodiazepine Screen, Urine NEGATIVE NEGATIVE    Cocaine Metabolite, Urine NEGATIVE NEGATIVE    Methadone Screen, Urine NEGATIVE NEGATIVE    Opiates, Urine NEGATIVE NEGATIVE    Phencyclidine, Urine NEGATIVE NEGATIVE    Propoxyphene, Urine NEGATIVE NEGATIVE    Cannabinoid Scrn, Ur NEGATIVE NEGATIVE    Oxycodone Screen, Ur NEGATIVE NEGATIVE    Methamphetamine, Urine NEGATIVE NEGATIVE    Tricyclic Antidepressants, Urine NEGATIVE NEGATIVE    Buprenorphine Urine NEGATIVE NEGATIVE        Imaging/Diagnostics Last 24 Hours   No results found.     Assessment      Hospital Problems             Last Modified POA    * (Principal) Chronic hypertension affecting pregnancy 11/28/2022 Yes       Plan   Will attempt to get bp under control and then augment    Consultations Ordered:  None    Electronically signed by Cecilia Johnson DO on 11/28/22 at 9:08 PM EST

## 2022-11-29 NOTE — PROGRESS NOTES
Dr. Hollie Bell updated regarding current blood pressures after receiving PO Labetalol 400mg. Order was received to start Labetolol protocol. Order for IV Labetalol 20 mg placed per Dr. Hollie Bell.

## 2022-11-29 NOTE — PROGRESS NOTES
Obstetric/Gynecology Resident Interval Note    AROM performed without difficulty. Clear fluid return. Riaz Lloyd RN present for rupture. Category 1 tracing.      Attending updated    Sherrie Loyd DO  OB/GYN Resident, [facility]  11/29/2022, 3:54 PM

## 2022-11-29 NOTE — PROGRESS NOTES
Labor Progress Note and Mag Note    Chanell Narayanan is a 28 y.o. female [id number] at 36w3d  The patient was seen and examined. Her pain is well controlled. She reports fetal movement is present, complains of contractions, denies loss of fluid, denies vaginal bleeding. Denies s/s of preeclampsia including headache vision changes, difficulty breathing, chest pain, RUQ pain or worsening swelling of extremities. Vital Signs:  Vitals:    11/29/22 1410 11/29/22 1420 11/29/22 1430 11/29/22 1445   BP: 139/80 (!) 146/88 (!) 152/89 (!) 148/90   Pulse: 88 90 82 92   Resp:   17 15   Temp:       TempSrc:       SpO2: 94% 97% 98% 97%         FHT: 115, moderate variability, accelerations present, decelerations absent  Contractions: regular, every 4 minutes  Cervical Exam: will return with RN to perform cervical exam   Pitocin: @ 10 mu/min    Physical Exam:  Chest: clear to auscultation bilaterally  Heart: RRR no murmur  Abdomen: soft, nontender, nondistended  Extremities: DTR normal Right: 2/4   Left: 2/4  Clonus: absent    Urine Output: 2000 mL in 2hr; Clear urine    Membranes: Intact  Scalp Electrode in place: absent  Intrauterine Pressure Catheter in Place: absent  Interventions: Traction placed on Peters balloon. Peters balloon removed without difficulty.     Assessment/Plan:  Chanell Narayanan is a 28 y.o. female [id number] at 36w3d admitted for IOL 2/2 cHTN with CHARLES with SF    - GBS unknown (pending)   - Pen G for GBS prophylaxis   - VSS, Afebrile    - IVF LR @ 75 cc/hr   - Pitocin @ 0600   - Peters balloon removed with gentle traction   - Will await for RN to perform SVE and assess for AROM   - CEFM/ TOCO   - Diet: CLD  - Continue Magnesium Sulfate Treatment @ 2g/hr   - Mag checks Q 4 hours   - Strict Is and Os   - SCDs    Attending updated and in agreement with plan    Saintclair Collar, DO  Ob/Gyn Resident  11/29/2022, 3:07 PM

## 2022-11-29 NOTE — PROGRESS NOTES
Report called to [facility]. 's OB Dept. All questions answered. Call back number given for any follow up questions. Patient transported off unit with [facility]. Patient was stable at time of discharge.

## 2022-11-30 ENCOUNTER — ANESTHESIA (OUTPATIENT)
Dept: LABOR AND DELIVERY | Age: 32
End: 2022-11-30
Payer: MEDICARE

## 2022-11-30 ENCOUNTER — ANESTHESIA EVENT (OUTPATIENT)
Dept: LABOR AND DELIVERY | Age: 32
End: 2022-11-30
Payer: MEDICARE

## 2022-11-30 LAB
ANION GAP SERPL CALCULATED.3IONS-SCNC: 13 MMOL/L (ref 9–17)
BUN BLDV-MCNC: 4 MG/DL (ref 6–20)
CALCIUM SERPL-MCNC: 6.6 MG/DL (ref 8.6–10.4)
CHLORIDE BLD-SCNC: 96 MMOL/L (ref 98–107)
CO2: 23 MMOL/L (ref 20–31)
CREAT SERPL-MCNC: 0.46 MG/DL (ref 0.5–0.9)
GFR SERPL CREATININE-BSD FRML MDRD: >60 ML/MIN/1.73M2
GLUCOSE BLD-MCNC: 100 MG/DL (ref 70–99)
HCT VFR BLD CALC: 32 % (ref 36.3–47.1)
HEMOGLOBIN: 10.4 G/DL (ref 11.9–15.1)
MCH RBC QN AUTO: 27.8 PG (ref 25.2–33.5)
MCHC RBC AUTO-ENTMCNC: 32.5 G/DL (ref 28.4–34.8)
MCV RBC AUTO: 85.6 FL (ref 82.6–102.9)
NRBC AUTOMATED: 0 PER 100 WBC
PDW BLD-RTO: 15 % (ref 11.8–14.4)
PLATELET # BLD: 206 K/UL (ref 138–453)
PMV BLD AUTO: 12.1 FL (ref 8.1–13.5)
POTASSIUM SERPL-SCNC: 3.2 MMOL/L (ref 3.7–5.3)
RBC # BLD: 3.74 M/UL (ref 3.95–5.11)
SODIUM BLD-SCNC: 132 MMOL/L (ref 135–144)
WBC # BLD: 10.7 K/UL (ref 3.5–11.3)

## 2022-11-30 PROCEDURE — 2709999900 HC NON-CHARGEABLE SUPPLY: Performed by: OBSTETRICS & GYNECOLOGY

## 2022-11-30 PROCEDURE — 2580000003 HC RX 258: Performed by: STUDENT IN AN ORGANIZED HEALTH CARE EDUCATION/TRAINING PROGRAM

## 2022-11-30 PROCEDURE — 2500000003 HC RX 250 WO HCPCS: Performed by: NURSE ANESTHETIST, CERTIFIED REGISTERED

## 2022-11-30 PROCEDURE — 80048 BASIC METABOLIC PNL TOTAL CA: CPT

## 2022-11-30 PROCEDURE — 2500000003 HC RX 250 WO HCPCS: Performed by: STUDENT IN AN ORGANIZED HEALTH CARE EDUCATION/TRAINING PROGRAM

## 2022-11-30 PROCEDURE — 6370000000 HC RX 637 (ALT 250 FOR IP): Performed by: STUDENT IN AN ORGANIZED HEALTH CARE EDUCATION/TRAINING PROGRAM

## 2022-11-30 PROCEDURE — 10907ZC DRAINAGE OF AMNIOTIC FLUID, THERAPEUTIC FROM PRODUCTS OF CONCEPTION, VIA NATURAL OR ARTIFICIAL OPENING: ICD-10-PCS

## 2022-11-30 PROCEDURE — 85027 COMPLETE CBC AUTOMATED: CPT

## 2022-11-30 PROCEDURE — 0UH90HZ INSERTION OF CONTRACEPTIVE DEVICE INTO UTERUS, OPEN APPROACH: ICD-10-PCS

## 2022-11-30 PROCEDURE — 6360000002 HC RX W HCPCS: Performed by: NURSE ANESTHETIST, CERTIFIED REGISTERED

## 2022-11-30 PROCEDURE — 6360000002 HC RX W HCPCS: Performed by: STUDENT IN AN ORGANIZED HEALTH CARE EDUCATION/TRAINING PROGRAM

## 2022-11-30 PROCEDURE — 36415 COLL VENOUS BLD VENIPUNCTURE: CPT

## 2022-11-30 PROCEDURE — 6360000002 HC RX W HCPCS

## 2022-11-30 PROCEDURE — 7100000000 HC PACU RECOVERY - FIRST 15 MIN: Performed by: OBSTETRICS & GYNECOLOGY

## 2022-11-30 PROCEDURE — 3609079900 HC CESAREAN SECTION: Performed by: OBSTETRICS & GYNECOLOGY

## 2022-11-30 PROCEDURE — 2580000003 HC RX 258: Performed by: NURSE ANESTHETIST, CERTIFIED REGISTERED

## 2022-11-30 PROCEDURE — 3700000001 HC ADD 15 MINUTES (ANESTHESIA): Performed by: OBSTETRICS & GYNECOLOGY

## 2022-11-30 PROCEDURE — 1220000000 HC SEMI PRIVATE OB R&B

## 2022-11-30 PROCEDURE — 7100000001 HC PACU RECOVERY - ADDTL 15 MIN: Performed by: OBSTETRICS & GYNECOLOGY

## 2022-11-30 PROCEDURE — 3700000000 HC ANESTHESIA ATTENDED CARE: Performed by: OBSTETRICS & GYNECOLOGY

## 2022-11-30 PROCEDURE — 6370000000 HC RX 637 (ALT 250 FOR IP)

## 2022-11-30 PROCEDURE — 88307 TISSUE EXAM BY PATHOLOGIST: CPT

## 2022-11-30 PROCEDURE — 6360000002 HC RX W HCPCS: Performed by: OBSTETRICS & GYNECOLOGY

## 2022-11-30 RX ORDER — VITAMIN A, ASCORBIC ACID, CHOLECALCIFEROL, .ALPHA.-TOCOPHEROL ACETATE, DL-, THIAMINE MONONITRATE, RIBOFLAVIN, NIACINAMIDE, PYRIDOXINE HYDROCHLORIDE, FOLIC ACID, CYANOCOBALAMIN, CALCIUM CARBONATE, IRON, ZINC OXIDE, AND CUPRIC OXIDE 4000; 120; 400; 22; 1.84; 3; 20; 10; 1; 12; 200; 29; 25; 2 [IU]/1; MG/1; [IU]/1; [IU]/1; MG/1; MG/1; MG/1; MG/1; MG/1; UG/1; MG/1; MG/1; MG/1; MG/1
1 TABLET ORAL DAILY
Status: DISCONTINUED | OUTPATIENT
Start: 2022-11-30 | End: 2022-12-02 | Stop reason: HOSPADM

## 2022-11-30 RX ORDER — KETOROLAC TROMETHAMINE 30 MG/ML
INJECTION, SOLUTION INTRAMUSCULAR; INTRAVENOUS PRN
Status: DISCONTINUED | OUTPATIENT
Start: 2022-11-30 | End: 2022-12-01 | Stop reason: SDUPTHER

## 2022-11-30 RX ORDER — SODIUM CHLORIDE, SODIUM LACTATE, POTASSIUM CHLORIDE, CALCIUM CHLORIDE 600; 310; 30; 20 MG/100ML; MG/100ML; MG/100ML; MG/100ML
INJECTION, SOLUTION INTRAVENOUS CONTINUOUS
Status: DISCONTINUED | OUTPATIENT
Start: 2022-11-30 | End: 2022-12-01

## 2022-11-30 RX ORDER — SODIUM CHLORIDE 0.9 % (FLUSH) 0.9 %
5-40 SYRINGE (ML) INJECTION PRN
Status: DISCONTINUED | OUTPATIENT
Start: 2022-11-30 | End: 2022-12-02 | Stop reason: HOSPADM

## 2022-11-30 RX ORDER — IBUPROFEN 600 MG/1
600 TABLET ORAL EVERY 6 HOURS PRN
Qty: 30 TABLET | Refills: 0 | Status: SHIPPED | OUTPATIENT
Start: 2022-11-30

## 2022-11-30 RX ORDER — DOCUSATE SODIUM 100 MG/1
100 CAPSULE, LIQUID FILLED ORAL 2 TIMES DAILY
Status: DISCONTINUED | OUTPATIENT
Start: 2022-11-30 | End: 2022-12-02 | Stop reason: HOSPADM

## 2022-11-30 RX ORDER — SODIUM CHLORIDE 0.9 % (FLUSH) 0.9 %
5-40 SYRINGE (ML) INJECTION EVERY 12 HOURS SCHEDULED
Status: DISCONTINUED | OUTPATIENT
Start: 2022-11-30 | End: 2022-12-02 | Stop reason: HOSPADM

## 2022-11-30 RX ORDER — IBUPROFEN 600 MG/1
600 TABLET ORAL EVERY 6 HOURS
Status: DISCONTINUED | OUTPATIENT
Start: 2022-12-01 | End: 2022-12-02 | Stop reason: HOSPADM

## 2022-11-30 RX ORDER — BISACODYL 10 MG
10 SUPPOSITORY, RECTAL RECTAL DAILY PRN
Status: DISCONTINUED | OUTPATIENT
Start: 2022-11-30 | End: 2022-12-02 | Stop reason: HOSPADM

## 2022-11-30 RX ORDER — OXYCODONE HYDROCHLORIDE 5 MG/1
10 TABLET ORAL EVERY 4 HOURS PRN
Status: DISCONTINUED | OUTPATIENT
Start: 2022-11-30 | End: 2022-12-02 | Stop reason: HOSPADM

## 2022-11-30 RX ORDER — ONDANSETRON 4 MG/1
4 TABLET, ORALLY DISINTEGRATING ORAL 3 TIMES DAILY PRN
Qty: 21 TABLET | Refills: 0 | Status: SHIPPED | OUTPATIENT
Start: 2022-11-30

## 2022-11-30 RX ORDER — TRANEXAMIC ACID 10 MG/ML
1000 INJECTION, SOLUTION INTRAVENOUS ONCE
Status: COMPLETED | OUTPATIENT
Start: 2022-11-30 | End: 2022-11-30

## 2022-11-30 RX ORDER — LABETALOL 200 MG/1
600 TABLET, FILM COATED ORAL 3 TIMES DAILY
Qty: 120 TABLET | Refills: 1 | Status: SHIPPED | OUTPATIENT
Start: 2022-11-30

## 2022-11-30 RX ORDER — ACETAMINOPHEN 500 MG
1000 TABLET ORAL EVERY 6 HOURS
Status: DISCONTINUED | OUTPATIENT
Start: 2022-11-30 | End: 2022-12-02 | Stop reason: HOSPADM

## 2022-11-30 RX ORDER — TRANEXAMIC ACID 10 MG/ML
1000 INJECTION, SOLUTION INTRAVENOUS ONCE
Status: DISCONTINUED | OUTPATIENT
Start: 2022-11-30 | End: 2022-11-30

## 2022-11-30 RX ORDER — PROCHLORPERAZINE EDISYLATE 5 MG/ML
10 INJECTION INTRAMUSCULAR; INTRAVENOUS ONCE
Status: DISCONTINUED | OUTPATIENT
Start: 2022-11-30 | End: 2022-12-02 | Stop reason: HOSPADM

## 2022-11-30 RX ORDER — PHENYLEPHRINE HCL IN 0.9% NACL 1 MG/10 ML
SYRINGE (ML) INTRAVENOUS PRN
Status: DISCONTINUED | OUTPATIENT
Start: 2022-11-30 | End: 2022-12-01 | Stop reason: SDUPTHER

## 2022-11-30 RX ORDER — ONDANSETRON 2 MG/ML
4 INJECTION INTRAMUSCULAR; INTRAVENOUS EVERY 6 HOURS PRN
Status: DISCONTINUED | OUTPATIENT
Start: 2022-11-30 | End: 2022-12-02 | Stop reason: HOSPADM

## 2022-11-30 RX ORDER — ACETAMINOPHEN 500 MG
1000 TABLET ORAL EVERY 6 HOURS PRN
Qty: 120 TABLET | Refills: 0 | Status: SHIPPED | OUTPATIENT
Start: 2022-11-30 | End: 2022-12-30

## 2022-11-30 RX ORDER — MORPHINE SULFATE 1 MG/ML
INJECTION, SOLUTION EPIDURAL; INTRATHECAL; INTRAVENOUS
Status: COMPLETED | OUTPATIENT
Start: 2022-11-30 | End: 2022-11-30

## 2022-11-30 RX ORDER — OXYCODONE HYDROCHLORIDE 5 MG/1
5 TABLET ORAL EVERY 4 HOURS PRN
Status: DISCONTINUED | OUTPATIENT
Start: 2022-11-30 | End: 2022-12-02 | Stop reason: HOSPADM

## 2022-11-30 RX ORDER — KETOROLAC TROMETHAMINE 30 MG/ML
30 INJECTION, SOLUTION INTRAMUSCULAR; INTRAVENOUS EVERY 6 HOURS
Status: COMPLETED | OUTPATIENT
Start: 2022-11-30 | End: 2022-12-01

## 2022-11-30 RX ORDER — SIMETHICONE 80 MG
80 TABLET,CHEWABLE ORAL EVERY 6 HOURS PRN
Status: DISCONTINUED | OUTPATIENT
Start: 2022-11-30 | End: 2022-12-02 | Stop reason: HOSPADM

## 2022-11-30 RX ORDER — POLYETHYLENE GLYCOL 3350 17 G/17G
17 POWDER, FOR SOLUTION ORAL DAILY
Status: DISCONTINUED | OUTPATIENT
Start: 2022-11-30 | End: 2022-12-02 | Stop reason: HOSPADM

## 2022-11-30 RX ORDER — SENNA AND DOCUSATE SODIUM 50; 8.6 MG/1; MG/1
1 TABLET, FILM COATED ORAL DAILY
Qty: 14 TABLET | Refills: 0 | Status: SHIPPED | OUTPATIENT
Start: 2022-11-30 | End: 2022-12-14

## 2022-11-30 RX ORDER — SODIUM CHLORIDE, SODIUM LACTATE, POTASSIUM CHLORIDE, CALCIUM CHLORIDE 600; 310; 30; 20 MG/100ML; MG/100ML; MG/100ML; MG/100ML
INJECTION, SOLUTION INTRAVENOUS CONTINUOUS PRN
Status: DISCONTINUED | OUTPATIENT
Start: 2022-11-30 | End: 2022-12-01 | Stop reason: SDUPTHER

## 2022-11-30 RX ORDER — TRISODIUM CITRATE DIHYDRATE AND CITRIC ACID MONOHYDRATE 500; 334 MG/5ML; MG/5ML
30 SOLUTION ORAL ONCE
Status: COMPLETED | OUTPATIENT
Start: 2022-11-30 | End: 2022-11-30

## 2022-11-30 RX ORDER — LANOLIN 72 %
OINTMENT (GRAM) TOPICAL
Status: DISCONTINUED | OUTPATIENT
Start: 2022-11-30 | End: 2022-12-02 | Stop reason: HOSPADM

## 2022-11-30 RX ORDER — ACETAMINOPHEN 500 MG
1000 TABLET ORAL ONCE
Status: COMPLETED | OUTPATIENT
Start: 2022-11-30 | End: 2022-11-30

## 2022-11-30 RX ORDER — BUPIVACAINE HYDROCHLORIDE 7.5 MG/ML
INJECTION, SOLUTION INTRASPINAL
Status: COMPLETED | OUTPATIENT
Start: 2022-11-30 | End: 2022-11-30

## 2022-11-30 RX ORDER — LABETALOL 200 MG/1
600 TABLET, FILM COATED ORAL EVERY 8 HOURS
Status: DISCONTINUED | OUTPATIENT
Start: 2022-11-30 | End: 2022-12-02 | Stop reason: HOSPADM

## 2022-11-30 RX ORDER — NALOXONE HYDROCHLORIDE 0.4 MG/ML
0.4 INJECTION, SOLUTION INTRAMUSCULAR; INTRAVENOUS; SUBCUTANEOUS PRN
Status: DISCONTINUED | OUTPATIENT
Start: 2022-11-30 | End: 2022-12-02 | Stop reason: HOSPADM

## 2022-11-30 RX ORDER — DIPHENHYDRAMINE HYDROCHLORIDE 50 MG/ML
25 INJECTION INTRAMUSCULAR; INTRAVENOUS EVERY 6 HOURS PRN
Status: DISCONTINUED | OUTPATIENT
Start: 2022-11-30 | End: 2022-12-02 | Stop reason: HOSPADM

## 2022-11-30 RX ORDER — FERROUS SULFATE 325(65) MG
325 TABLET ORAL 2 TIMES DAILY
Qty: 60 TABLET | Refills: 2 | Status: SHIPPED | OUTPATIENT
Start: 2022-11-30

## 2022-11-30 RX ORDER — SODIUM CHLORIDE 9 MG/ML
INJECTION, SOLUTION INTRAVENOUS PRN
Status: DISCONTINUED | OUTPATIENT
Start: 2022-11-30 | End: 2022-12-02 | Stop reason: HOSPADM

## 2022-11-30 RX ORDER — OXYCODONE HYDROCHLORIDE 5 MG/1
5 TABLET ORAL EVERY 6 HOURS PRN
Qty: 20 TABLET | Refills: 0 | Status: SHIPPED | OUTPATIENT
Start: 2022-11-30 | End: 2022-12-05

## 2022-11-30 RX ORDER — FAMOTIDINE 20 MG/1
20 TABLET, FILM COATED ORAL 2 TIMES DAILY PRN
Status: DISCONTINUED | OUTPATIENT
Start: 2022-11-30 | End: 2022-12-02 | Stop reason: HOSPADM

## 2022-11-30 RX ADMIN — LABETALOL HYDROCHLORIDE 600 MG: 200 TABLET, FILM COATED ORAL at 23:26

## 2022-11-30 RX ADMIN — ONDANSETRON 4 MG: 2 INJECTION INTRAMUSCULAR; INTRAVENOUS at 13:54

## 2022-11-30 RX ADMIN — MAGNESIUM SULFATE HEPTAHYDRATE 2000 MG/HR: 40 INJECTION, SOLUTION INTRAVENOUS at 06:01

## 2022-11-30 RX ADMIN — Medication 500 MG: at 14:05

## 2022-11-30 RX ADMIN — LABETALOL HYDROCHLORIDE 600 MG: 200 TABLET, FILM COATED ORAL at 19:42

## 2022-11-30 RX ADMIN — Medication 2.5 MILLION UNITS: at 04:24

## 2022-11-30 RX ADMIN — Medication 1 MILLI-UNITS/MIN: at 05:07

## 2022-11-30 RX ADMIN — MAGNESIUM SULFATE HEPTAHYDRATE 2000 MG/HR: 40 INJECTION, SOLUTION INTRAVENOUS at 15:43

## 2022-11-30 RX ADMIN — POTASSIUM CHLORIDE 40 MEQ: 1500 TABLET, EXTENDED RELEASE ORAL at 23:24

## 2022-11-30 RX ADMIN — MORPHINE SULFATE 0.2 MG: 1 INJECTION, SOLUTION EPIDURAL; INTRATHECAL; INTRAVENOUS at 13:51

## 2022-11-30 RX ADMIN — Medication 2000 MG: at 13:34

## 2022-11-30 RX ADMIN — LABETALOL HYDROCHLORIDE 600 MG: 200 TABLET, FILM COATED ORAL at 08:07

## 2022-11-30 RX ADMIN — Medication 100 MCG: at 14:07

## 2022-11-30 RX ADMIN — LEVONORGESTREL 1 EACH: 52 INTRAUTERINE DEVICE INTRAUTERINE at 14:20

## 2022-11-30 RX ADMIN — SODIUM CITRATE AND CITRIC ACID MONOHYDRATE 30 ML: 500; 334 SOLUTION ORAL at 12:50

## 2022-11-30 RX ADMIN — Medication 200 MCG: at 13:56

## 2022-11-30 RX ADMIN — SODIUM CHLORIDE, POTASSIUM CHLORIDE, SODIUM LACTATE AND CALCIUM CHLORIDE: 600; 310; 30; 20 INJECTION, SOLUTION INTRAVENOUS at 14:15

## 2022-11-30 RX ADMIN — KETOROLAC TROMETHAMINE 30 MG: 30 INJECTION, SOLUTION INTRAMUSCULAR; INTRAVENOUS at 14:43

## 2022-11-30 RX ADMIN — SODIUM CHLORIDE, POTASSIUM CHLORIDE, SODIUM LACTATE AND CALCIUM CHLORIDE: 600; 310; 30; 20 INJECTION, SOLUTION INTRAVENOUS at 03:58

## 2022-11-30 RX ADMIN — ONDANSETRON 4 MG: 2 INJECTION INTRAMUSCULAR; INTRAVENOUS at 20:10

## 2022-11-30 RX ADMIN — Medication 200 MCG: at 13:58

## 2022-11-30 RX ADMIN — Medication 200 MCG: at 13:54

## 2022-11-30 RX ADMIN — Medication 909 ML/HR: at 14:14

## 2022-11-30 RX ADMIN — TRANEXAMIC ACID 1000 MG: 10 INJECTION, SOLUTION INTRAVENOUS at 12:52

## 2022-11-30 RX ADMIN — ONDANSETRON 4 MG: 2 INJECTION INTRAMUSCULAR; INTRAVENOUS at 05:12

## 2022-11-30 RX ADMIN — Medication 2.5 MILLION UNITS: at 00:29

## 2022-11-30 RX ADMIN — KETOROLAC TROMETHAMINE 30 MG: 30 INJECTION, SOLUTION INTRAMUSCULAR at 20:14

## 2022-11-30 RX ADMIN — ACETAMINOPHEN 1000 MG: 500 TABLET ORAL at 12:49

## 2022-11-30 RX ADMIN — BUPIVACAINE HYDROCHLORIDE IN DEXTROSE 15 MG: 7.5 INJECTION, SOLUTION SUBARACHNOID at 13:51

## 2022-11-30 RX ADMIN — SODIUM CHLORIDE, PRESERVATIVE FREE 20 MG: 5 INJECTION INTRAVENOUS at 12:48

## 2022-11-30 RX ADMIN — Medication 25 MCG: at 00:29

## 2022-11-30 RX ADMIN — ACETAMINOPHEN 1000 MG: 500 TABLET ORAL at 23:23

## 2022-11-30 RX ADMIN — SODIUM CHLORIDE, POTASSIUM CHLORIDE, SODIUM LACTATE AND CALCIUM CHLORIDE: 600; 310; 30; 20 INJECTION, SOLUTION INTRAVENOUS at 13:40

## 2022-11-30 ASSESSMENT — PAIN DESCRIPTION - ORIENTATION
ORIENTATION: LOWER
ORIENTATION: LOWER

## 2022-11-30 ASSESSMENT — PAIN DESCRIPTION - LOCATION
LOCATION: ABDOMEN
LOCATION: ABDOMEN

## 2022-11-30 ASSESSMENT — PAIN DESCRIPTION - DESCRIPTORS
DESCRIPTORS: SORE
DESCRIPTORS: SORE

## 2022-11-30 ASSESSMENT — PAIN SCALES - GENERAL
PAINLEVEL_OUTOF10: 3
PAINLEVEL_OUTOF10: 1

## 2022-11-30 ASSESSMENT — PAIN - FUNCTIONAL ASSESSMENT
PAIN_FUNCTIONAL_ASSESSMENT: ACTIVITIES ARE NOT PREVENTED
PAIN_FUNCTIONAL_ASSESSMENT: ACTIVITIES ARE NOT PREVENTED

## 2022-11-30 NOTE — PROGRESS NOTES
Labor Progress Note    Katie Lee is a 28 y.o. female [id number] at 37w2d  The patient was seen and examined. Her pain is well controlled. She reports fetal movement is present, complains of contractions, complains of loss of fluid, denies vaginal bleeding. Denies s/s of preeclampsia including headache vision changes, difficulty breathing, chest pain, RUQ pain or worsening swelling of extremities. Vital Signs:  Vitals:    22 0510 22 0600 22 0700 22 0800   BP:  102/61 115/68 126/85   Pulse:  87 78 73   Resp: 18 18  17   Temp: 98.4 °F (36.9 °C)   98.2 °F (36.8 °C)   TempSrc: Oral   Oral   SpO2:  94% 95% 95%         FHT:  125 , moderate variability, accelerations present, decelerations absent  Contractions: regular, every 4 minutes  Cervical Exam: 3-4 cm dilated, 50 effaced, -1 station  Pitocin: @ 2 mu/min    Chaperone for Exam  Chaperone was present. Chaperone Mary ARTEAGA    Membranes: Ruptured clear fluid  Scalp Electrode in place: absent  Intrauterine Pressure Catheter in Place: absent    Interventions: SVE    Assessment/Plan:  Katie Lee is a 28 y.o. female [id number] at 37w2d admitted for IOL 2/2 cHTN w/ CHARLES w/ SF (BP)   - GBS unknown  - Pen G for GBS prophylaxis   - VSS, Afebrile    - IVF LR @ 75 cc/hr  - Pit @ 0604 () (break 3734-2873)   - AROM (clr) @ 1550 ()   - S/p Cytotec 25 PO x1   - S/p Peters balloon   - Morphine/Compazine x2    - CEFM/ TOCO   - Diet: NPO    Discussed with the patient no cervical change in 18 hours and has been ruptured for 18 hours and on pitocin for over 24 hours. Discussed that this is a failed induction of labor however there is a category one fetal heart tracing and maternal status is reassuring therefore we can continue with induction or proceed with  section. The patient has elected to proceed with  section.  She would also like IUD placed at the time of  section for history of dysmenorrhea for which she previously

## 2022-11-30 NOTE — ANESTHESIA PRE PROCEDURE
Department of Anesthesiology  Preprocedure Note       Name:  Aleksandra Cole   Age:  28 y.o.  :  1990                                          MRN:  [id number]         Date:  2022      Surgeon: Mery Mckeon):  John Vela DO    Procedure:     Medications prior to admission:   Prior to Admission medications    Medication Sig Start Date End Date Taking? Authorizing Provider   labetalol (NORMODYNE) 200 MG tablet Take 2 tablets by mouth 3 times daily Take 400mg by mouth in the morning, 400mg at noon, and 400mg at bedtime. 22   Elsie Huitron DO   potassium chloride (KLOR-CON M) 20 MEQ extended release tablet Take 2 tablets by mouth 3 times daily  Patient not taking: Reported on 2022   TK Dudley NP   ferrous sulfate (IRON 325) 325 (65 Fe) MG tablet Take 1 tablet by mouth 2 times daily 22   TK Dudley NP   NIFEdipine (PROCARDIA) 10 MG capsule Take 1 capsule by mouth 3 times daily 22   TK Roque CNM   aspirin 81 MG chewable tablet Take 81 mg by mouth in the morning. Historical Provider, MD   Prenatal Vit-Fe Fumarate-FA (PRENATAL VITAMIN) 27-0.8 MG TABS Take 1 tablet by mouth daily 22   TK Roque CNM       Current medications:    No current facility-administered medications for this visit. No current outpatient medications on file.      Facility-Administered Medications Ordered in Other Visits   Medication Dose Route Frequency Provider Last Rate Last Admin    oxytocin (PITOCIN) 30 units in 500 mL infusion  1-20 idris-units/min IntraVENous Continuous Jeimy Gallego MD   Stopped at 22 0845    tranexamic acid-NaCl IVPB premix 1,000 mg  1,000 mg IntraVENous Once Estefani Velez DO        ceFAZolin (ANCEF) 2000 mg in sterile water 20 mL IV syringe  2,000 mg IntraVENous Q8H Tesha Velez DO        tranexamic acid-NaCl IVPB premix 1,000 mg  1,000 mg IntraVENous Once Selene Tsai, DO        levonorgestrel (MIRENA) IUD 52 mg 1 each  1 each IntraUTERine Prior to discharge [facility], DO        acetaminophen (TYLENOL) tablet 1,000 mg  1,000 mg Oral Once Eliel Concepcionine Cacciotti, DO        citric acid-sodium citrate (BICITRA) solution 30 mL  30 mL Oral Once Elielaminah Castillocivanita, DO        famotidine (PEPCID) 20 mg in sodium chloride (PF) 0.9 % 10 mL injection  20 mg IntraVENous Once [facility], DO        lactated ringers bolus  500 mL IntraVENous PRN Serafin Torres, DO        Or    lactated ringers bolus  1,000 mL IntraVENous PRN Serafin Torres, DO        sodium chloride flush 0.9 % injection 5-40 mL  5-40 mL IntraVENous 2 times per day Serafin Torres, DO   10 mL at 11/29/22 0848    sodium chloride flush 0.9 % injection 5-40 mL  5-40 mL IntraVENous PRN Serafin Torres, DO        0.9 % sodium chloride infusion  25 mL IntraVENous PRN Serafin Torres, DO        ondansetron (ZOFRAN) injection 4 mg  4 mg IntraVENous Q6H PRN [facility], [facility]   4 mg at 11/30/22 4722    diphenhydrAMINE (BENADRYL) tablet 25 mg  25 mg Oral Q4H PRN Serafin Garzaning, DO        carboprost (HEMABATE) injection 250 mcg  250 mcg IntraMUSCular PRN [facility], DO        miSOPROStol (CYTOTEC) tablet 800 mcg  800 mcg Rectal PRN Serafin Penning, DO        acetaminophen (TYLENOL) tablet 1,000 mg  1,000 mg Oral Q6H PRN Serafin Torres, DO        benzocaine-menthol (DERMOPLAST) 20-0.5 % spray   Topical PRN [facility]        penicillin G potassium in d5w IVPB 2.5 Million Units  2.5 Million Units IntraVENous Q4H Serafin Torres, DO   Stopped at 11/30/22 0455    potassium chloride (KLOR-CON M) extended release tablet 40 mEq  40 mEq Oral PRN Bongie Penning, DO   40 mEq at 11/29/22 1742    Or    potassium bicarb-citric acid (EFFER-K) effervescent tablet 40 mEq  40 mEq Oral PRN Serafin Garzaning, DO        Or    potassium chloride 10 mEq/100 mL IVPB (Peripheral Line)  10 mEq IntraVENous PRN Serafin Torres, DO 100 mL/hr at 22 1416 10 mEq at 22 1416    sodium chloride flush 0.9 % injection 10 mL  10 mL IntraVENous 2 times per day Eual Bel, DO   10 mL at 22 0912    sodium chloride flush 0.9 % injection 10 mL  10 mL IntraVENous PRN Eual Bel, DO        0.9 % sodium chloride infusion   IntraVENous PRN Eual Bel, DO        magnesium sulfate (32194 mg/500mL infusion)  2,000 mg/hr IntraVENous Continuous Stella Knannlein, DO 50 mL/hr at 22 0601 2,000 mg/hr at 22 0601    calcium gluconate 10 % injection 1,000 mg  1,000 mg IntraVENous PRN Eual Bel, DO        lactated ringers infusion   IntraVENous Continuous Eual Ble, DO   Stopped at 22 0358    lactated ringers infusion   IntraVENous Continuous Stella Knannlein, DO 25 mL/hr at 22 0545 Rate Verify at 22 0545    labetalol (NORMODYNE) tablet 600 mg  600 mg Oral q8h Eual Bel, DO   600 mg at 22 3101       Allergies:     Allergies   Allergen Reactions    No Known Allergies        Problem List:    Patient Active Problem List   Diagnosis Code    Chronic HTN (meds) O10.919    Hx cocaine use F14.10    H/O dilation and curettage (2019) Z98.890    Hx trichomonas A59.9    COVID pos 1/15/21 U07.1    Hypokalemia E87.6    Chronic hypertension affecting pregnancy O10.919    High-risk pregnancy in third trimester O09.93       Past Medical History:        Diagnosis Date    Anemia     Chlamydia     Cocaine abuse ([facility] [address]     last use 2019    Depression     Hypertension     Mental disorder     anxiety, depression     delivery     IOL @ 42 wks    Trichomonal vulvovaginitis        Past Surgical History:        Procedure Laterality Date    DILATION AND CURETTAGE OF UTERUS  2019       Social History:    Social History     Tobacco Use    Smoking status: Never    Smokeless tobacco: Never   Substance Use Topics    Alcohol use: Not Currently                                Counseling given: Not Answered      Vital Signs (Current): There were no vitals filed for this visit. BP Readings from Last 3 Encounters:   11/30/22 119/76   11/29/22 (!) 174/95   11/28/22 (!) 148/102       NPO Status:                                                                                 BMI:   Wt Readings from Last 3 Encounters:   11/28/22 225 lb (102.1 kg)   11/28/22 225 lb (102.1 kg)   11/28/22 225 lb (102.1 kg)     There is no height or weight on file to calculate BMI.    CBC:   Lab Results   Component Value Date/Time    WBC 10.7 11/30/2022 09:47 AM    RBC 3.74 11/30/2022 09:47 AM    HGB 10.4 11/30/2022 09:47 AM    HCT 32.0 11/30/2022 09:47 AM    MCV 85.6 11/30/2022 09:47 AM    RDW 15.0 11/30/2022 09:47 AM     11/30/2022 09:47 AM       CMP:   Lab Results   Component Value Date/Time     11/30/2022 09:47 AM    K 3.2 11/30/2022 09:47 AM    CL 96 11/30/2022 09:47 AM    CO2 23 11/30/2022 09:47 AM    BUN 4 11/30/2022 09:47 AM    CREATININE 0.46 11/30/2022 09:47 AM    GFRAA >60 09/27/2022 02:04 PM    LABGLOM >60 11/30/2022 09:47 AM    GLUCOSE 100 11/30/2022 09:47 AM    PROT 6.0 11/29/2022 05:21 AM    CALCIUM 6.6 11/30/2022 09:47 AM    BILITOT 0.5 11/29/2022 05:21 AM    ALKPHOS 147 11/29/2022 05:21 AM    AST 15 11/29/2022 05:21 AM    ALT 8 11/29/2022 05:21 AM       POC Tests: No results for input(s): POCGLU, POCNA, POCK, POCCL, POCBUN, POCHEMO, POCHCT in the last 72 hours.     Coags:   Lab Results   Component Value Date/Time    PROTIME 12.8 01/14/2021 08:55 PM    INR 1.0 01/14/2021 08:55 PM    APTT 27.4 01/14/2021 08:55 PM       HCG (If Applicable):   Lab Results   Component Value Date    HCGQUANT 4,587 (H) 04/25/2022        ABGs: No results found for: PHART, PO2ART, BPL5MTX, NBP4YAQ, BEART, G9TWAEOC     Type & Screen (If Applicable):  No results found for: LABABO, LABRH    Drug/Infectious Status (If Applicable):  Lab Results   Component Value Date/Time    HEPCAB NONREACTIVE 05/25/2022 10:49 AM       COVID-19 Screening (If Applicable):   Lab Results   Component Value Date/Time    COVID19 DETECTED 01/15/2021 03:11 AM           Anesthesia Evaluation  Patient summary reviewed and Nursing notes reviewed no history of anesthetic complications:   Airway: Mallampati: II  TM distance: >3 FB   Neck ROM: full  Mouth opening: > = 3 FB   Dental: normal exam         Pulmonary:Negative Pulmonary ROS breath sounds clear to auscultation                             Cardiovascular:    (+) hypertension:,         Rhythm: regular  Rate: normal                 ROS comment: Chronic controlled until last 2 weeks     Neuro/Psych:   (+) psychiatric history:            GI/Hepatic/Renal:   (+) GERD:,          ROS comment: With pregnancy. Endo/Other: Negative Endo/Other ROS                    Abdominal:             Vascular: negative vascular ROS. Other Findings:             Anesthesia Plan      spinal     ASA 3             Anesthetic plan and risks discussed with patient. Plan discussed with CRNA.                     Debi Medina MD   11/30/2022

## 2022-11-30 NOTE — PROGRESS NOTES
Labor Progress Note  Resident Interval Magnesium Note    Hipolito Brewster is a 28 y.o. female [id number] at 37w2d  The patient was seen and examined. The patient is resting comfortably. Her pain is well controlled. She reports fetal movement is present, endorses contractions, complains of loss of fluid, denies vaginal bleeding. She denies headache, visual changes, abdominal pain in the right upper quadrant, vaginal bleeding, and backache. She denies any shortness of breath or chest pain. She denies change in her extremities, regarding swelling.     Continuous Medications:    oxytocin      sodium chloride      sodium chloride      magnesium sulfate 2,000 mg/hr (11/29/22 2125)    lactated ringers 25 mL/hr at 11/29/22 0913    lactated ringers 25 mL/hr at 11/30/22 0358         Vital Signs:  Vitals:    11/30/22 0103 11/30/22 0200 11/30/22 0301 11/30/22 0400   BP: 126/84 132/80 120/65 114/78   Pulse: (!) 101 96 90 88   Resp: 18 16 16 16   Temp:  98.1 °F (36.7 °C)  98.6 °F (37 °C)   TempSrc:  Oral  Oral   SpO2: 94% 94% 95% 95%           Physical Exam:  FHT:  125 , moderate variability, accelerations present, decelerations absent  Contractions: none    Chest: clear to auscultation bilaterally  Heart: RRR no murmur  Abdomen: soft, nontender, gravid, no s/s chorio or abruption  Extremities: DTR normal Right: 2/4   Left: 2/4  Clonus: absent    Urine Output: 260cc/hr; Clear urine      Pitocin: @ 0 mu/min    Membranes: Ruptured clear fluid  Scalp Electrode in place: absent  Intrauterine Pressure Catheter in Place: absent    Interventions: none    Labs:  Last Magnesium Level:   Lab Results   Component Value Date/Time    MG 4.0 11/29/2022 05:21 AM       BMP:    Recent Labs     11/28/22  1710 11/29/22  0521 11/29/22  1612    139  --    K 2.8* 2.6* 3.1*    103  --    CO2 24 20  --    BUN 5* 4*  --    CREATININE 0.56 0.40*  --    GLUCOSE 70 69*  --        Assessment/Plan:  Hipolito Brewster is a 28 y.o. female [id number] at 36w4d for IOL 2/2 cHTN w/ CHARLES w/ SF   - GBS positive, Pen G for GBS prophylaxis   - Continue Magnesium Sulfate Treatment 2g/hr, off @ 0528 on 11/30/22   - BPs normotensive   - Patient denies any s/s PreE   - UOP adequate   - VSS, afebrile   - Patient received pit break with Cytotec 25 mcg PO x1   - Restart pitocin   - Continue to monitor    Rafael Bowden MD  Ob/Gyn Resident  11/30/2022, 5:00 AM

## 2022-11-30 NOTE — PROGRESS NOTES
Labor Progress Note    Juventino Campbell is a 28 y.o. female [id number] at 36w3d  The patient was seen and examined. Her pain is well controlled. She reports fetal movement is present, complains of contractions, complains of loss of fluid, denies vaginal bleeding.        Vital Signs:  Vitals:    11/29/22 1900 11/29/22 1943 11/29/22 2006 11/29/22 2007   BP: (!) 148/99   (!) 157/99   Pulse: 91   88   Resp: 17 18  18   Temp:  98.2 °F (36.8 °C)     TempSrc:  Oral     SpO2: 96%  96%          FHT:  125 , moderate variability, accelerations present, decelerations absent  Contractions: regular, every 4-5 minutes    Chaperone for Intimate Exam: Chaperone was present for entire exam, Chaperone Name: Dannie Arnold RN  Cervical Exam: 4 cm dilated, 70% effaced, -1 station  Pitocin: @ 20 mu/min    Membranes: Ruptured clear fluid  Scalp Electrode in place: absent  Intrauterine Pressure Catheter in Place: absent    Interventions: SVE    Assessment/Plan:  Juventino Campbell is a 28 y.o. female [id number] at 36w3d admitted for IOL 2/2 cHTN with CHARLES with SF   - GBS unknown (pending), Pen G for GBS prophylaxis   - VSS, Afebrile   - IVF LR @ 75cc/ hr   - Pitocin @ 0600   - S/p cosme balloon    - AROM (clr) @ 1550   - CEFM/TOCO   - Diet: CLD   - Mag per protocol   - K+ replacement   - SVE unchanged   - Continue to monitor    Attending updated and in agreement with plan    Hudson Ruth MD  Ob/Gyn Resident  11/29/2022, 8:30 PM

## 2022-11-30 NOTE — LACTATION NOTE
Pt states baby nursed well in recovery, latch was comfortable. Packet given and reviewed. She is also in the process of weaning 25 mo old from the breast and has a pump at home if needed. Encouraged lots of skin to skin, frequent attempts, and to call for assistance as needed.

## 2022-11-30 NOTE — FLOWSHEET NOTE
Dr. Debbie Solomon here at bedside. SVE, pt unchanged. POC, options discussed with pt to continue with induction currently, or to proceed with a C/S. Pt states she is ready to deliver, and states she would like to have a C/S. Pitocin dc'd, charge RN notified, will notify anesthesia and NICU.

## 2022-11-30 NOTE — PROGRESS NOTES
Resident Interval Magnesium Sulfate Note    Jyoti Berger is a 28 y.o. female [id number] with a diagnosis of chronic hypertension with superimposed preeclamspia with severe features  The patient is resting comfortably. She denies headache, visual changes, abdominal pain in the right upper quadrant, nausea/vomiting, and dysuria. She denies any shortness of breath or chest pain. She denies change in her extremities, regarding swelling.     Continuous Medications:    oxytocin Stopped (11/30/22 0845)    sodium chloride      sodium chloride      magnesium sulfate 2,000 mg/hr (11/30/22 0601)    lactated ringers Stopped (11/30/22 0358)    lactated ringers 25 mL/hr at 11/30/22 0545     Vitals:    Vitals:    11/30/22 1100 11/30/22 1200 11/30/22 1300 11/30/22 1500   BP: 119/76 116/78 130/83 (!) 99/51   Pulse: 75 79 79 74   Resp: 15 17 18 16   Temp:  98.4 °F (36.9 °C)  97.4 °F (36.3 °C)   TempSrc:  Oral Oral Oral   SpO2: 95% 95% 97% 96%     Physical Exam:  Chest: clear to auscultation bilaterally  Heart: RRR no murmur  Abdomen: soft, nontender, nondistended  Extremities: DTR normal Bilateral upper extremities Right: +2/4   Left: +/4  Clonus: present    Urine Output: 158 cc/hr over the last 6 hours; Clear urine    Labs:  Last Magnesium Level:   Lab Results   Component Value Date/Time    MG 4.0 11/29/2022 05:21 AM       BMP:    Recent Labs     11/28/22  1710 11/29/22  0521 11/29/22  1612 11/30/22  0947    139  --  132*   K 2.8* 2.6* 3.1* 3.2*    103  --  96*   CO2 24 20  --  23   BUN 5* 4*  --  4*   CREATININE 0.56 0.40*  --  0.46*   GLUCOSE 70 69*  --  100*       ASSESSMENT/PLAN  Jyoti Berger is a 28 y.o. female [id number] diagnosis of chronic hypertension with superimposed preeclamspia with severe features   - Continue Magnesium Sulfate Treatment 2g/hr, off @ after 24 hours PP   - No Mag levels q6hrs per provider   - BPs intermittently elevated   - Patient denies any s/s PreE   - UOP adequate   - PreE labs wnl, P/C 0.33   - Currently controlled on labetalol 600 TID   - Last IV anti-hypertensive 22 at 1323   - Continue to monitor closely, plan for  section once NICU and anesthesia teams are available    Franco Louis MD  Ob/Gyn Resident  2022, 12:42 PM

## 2022-11-30 NOTE — ANESTHESIA POSTPROCEDURE EVALUATION
Department of Anesthesiology  Postprocedure Note    Patient: Liss Gonzalez  MRN: [id number]  YOB: 1990  Date of evaluation: 2022      Procedure Summary     Date: 22 Room / Location: [facility] OR [address] [place] [address]    Anesthesia Start: 1340 Anesthesia Stop:     Procedure:  SECTION Diagnosis:       Elective surgical procedure      Pre-eclampsia in third trimester      (36.4 wk IUP for primary c/s. Pt with pre-eclampsia with severe features. Failed induction of labor)    Surgeons: Nisreen Baez DO Responsible Provider: Amanda Mercado MD    Anesthesia Type: spinal ASA Status: 3          Anesthesia Type: No value filed.     Ilir Phase I:      Ilir Phase II:        Anesthesia Post Evaluation    Patient location during evaluation: PACU  Patient participation: complete - patient participated  Level of consciousness: awake and alert  Pain score: 3  Airway patency: patent  Nausea & Vomiting: no vomiting and no nausea  Complications: no  Cardiovascular status: hemodynamically stable  Respiratory status: acceptable  Hydration status: stable

## 2022-11-30 NOTE — PROGRESS NOTES
Resident Interval Magnesium Sulfate Note    Marnie Ayers is a 28 y.o. female [id number]   The patient is resting comfortably. She denies headache, visual changes, abdominal pain in the right upper quadrant, vaginal bleeding, nausea, and backache. She denies any shortness of breath or chest pain. She denies change in her extremities, regarding swelling.     Continuous Medications:    sodium chloride      sodium chloride      magnesium sulfate 2,000 mg/hr (11/29/22 1859)    oxytocin 20 idris-units/min (11/29/22 1859)    lactated ringers 25 mL/hr at 11/29/22 0913    lactated ringers 25 mL/hr at 11/29/22 0800       Vitals:    Vitals:    11/29/22 1900 11/29/22 1943 11/29/22 2006 11/29/22 2007   BP: (!) 148/99   (!) 157/99   Pulse: 91   88   Resp: 17 18  18   Temp:  98.2 °F (36.8 °C)     TempSrc:  Oral     SpO2: 96%  96%          Physical Exam:  Chest: clear to auscultation bilaterally  Heart: RRR no murmur  Abdomen: soft, nontender, nondistended  Extremities: DTR normal Bilateral upper extremities Right: 2/4   Left: 2/4  Clonus: absent    Urine Output: 130 cc/hr; Clear urine    Labs:  Last Magnesium Level:   Lab Results   Component Value Date/Time    MG 4.0 11/29/2022 05:21 AM       BMP:    Recent Labs     11/28/22  1710 11/29/22  0521 11/29/22  1612    139  --    K 2.8* 2.6* 3.1*    103  --    CO2 24 20  --    BUN 5* 4*  --    CREATININE 0.56 0.40*  --    GLUCOSE 70 69*  --        ASSESSMENT/PLAN  Marnie Ayers is a 28 y.o. female [id number]    - Continue Magnesium Sulfate Treatment 2g/hr, off @ 0528 on 11/30/22   - BPs elevated   - Patient denies any s/s PreE   - UOP adequate   - PreE labs wnl, P/C 0.33 (11/29)   - Currently controlled on Labetalol 600 TID   - Last IV anti-hypertensive 1323 11/29   - Continue to monitor    Dariana Petersen MD  Ob/Gyn Resident  11/29/2022, 8:46 PM

## 2022-11-30 NOTE — ANESTHESIA PROCEDURE NOTES
Spinal Block    Patient location during procedure: OR  End time: 11/30/2022 1:51 PM  Reason for block: primary anesthetic and at surgeon's request  Staffing  Anesthesiologist: Meghana Cantor MD  Resident/CRNA: TK Paulson CRNA  Spinal Block  Patient position: sitting  Prep: Betadine  Patient monitoring: frequent blood pressure checks and continuous pulse ox  Approach: midline  Location: L3/L4  Provider prep: sterile gloves and mask  Local infiltration: lidocaine  Needle  Needle type: Pencan   Needle gauge: 24 G  Needle length: 4 in  Assessment  Sensory level: T4  Swirl obtained: Yes  CSF: clear  Attempts: 1  Hemodynamics: stable  Preanesthetic Checklist  Completed: patient identified, IV checked, site marked, risks and benefits discussed, surgical/procedural consents, equipment checked, pre-op evaluation, timeout performed, anesthesia consent given, oxygen available and monitors applied/VS acknowledged

## 2022-11-30 NOTE — PROGRESS NOTES
Resident Interval Magnesium Sulfate Note    James Ching is a 28 y.o. female [id number]   The patient is resting comfortably. She denies headache, visual changes, abdominal pain in the right upper quadrant, vaginal bleeding, nausea, backache, and dysuria. She denies any shortness of breath or chest pain. She denies change in her extremities, regarding swelling.     Continuous Medications:    sodium chloride      sodium chloride      magnesium sulfate 2,000 mg/hr (11/29/22 2125)    lactated ringers 25 mL/hr at 11/29/22 0913    lactated ringers 25 mL/hr at 11/29/22 0800       Vitals:    Vitals:    11/29/22 2350 11/30/22 0001 11/30/22 0030 11/30/22 0103   BP: (!) 149/91 (!) 148/105  126/84   Pulse: 91 (!) 108  (!) 101   Resp:  18  18   Temp:   98.6 °F (37 °C)    TempSrc:   Oral    SpO2:  97%  94%       Physical Exam:  Chest: clear to auscultation bilaterally  Heart: RRR no murmur  Abdomen: soft, nontender, nondistended  Extremities: DTR normal Bilateral lower extremities Right: 2/4   Left: 2/4  Clonus: absent    Urine Output: 430 cc/hr; Clear urine    Labs:  Last Magnesium Level:   Lab Results   Component Value Date/Time    MG 4.0 11/29/2022 05:21 AM       BMP:    Recent Labs     11/28/22  1710 11/29/22  0521 11/29/22  1612    139  --    K 2.8* 2.6* 3.1*    103  --    CO2 24 20  --    BUN 5* 4*  --    CREATININE 0.56 0.40*  --    GLUCOSE 70 69*  --        ASSESSMENT/PLAN  James Ching is a 28 y.o. female [id number]    - Continue Magnesium Sulfate Treatment 2g/hr, off @ 0528 on 11/30/22   - BPs normotensive   - Patient denies any s/s PreE   - UOP adequate   - PreE labs wnl, P/C 0.33 (11/29)   - Currently controlled on labetalol 600 TID   - Last IV anti-hypertensive 1323 11/29   - Continue to monitor    Jason Piper MD  Ob/Gyn Resident  11/30/2022, 1:15 AM

## 2022-11-30 NOTE — PROGRESS NOTES
Obstetric/Gynecology Resident Interval Note    Spoke with RN regarding care of this patient. Patient desires medication for pain. Will ordered morphine and compazine IM at this time. SVE performed by RN and unchanged from prior exam. Patient has now been unchanged and on pitocin for nearly 9 hours. After discussion of cervical exam and R/B/A, it was decided patient could possibly benefit from additional cervical ripening. Will plan to stop pitocin and administer Cytotec 25mcg PO x1.      Discussed with Dr. Halima Carter DO  OB/GYN Resident, PGY3  [facility]  11/30/2022, 12:52 AM

## 2022-11-30 NOTE — FLOWSHEET NOTE
Dr. Mackenzie Bell called and updated on pt sve and request for pain medication.  Plan of care discussed and orders received

## 2022-11-30 NOTE — OP NOTE
Operative Note  Department of Obstetrics and Gynecology  [facility]     Patient: Chio Villanueva   : 1990  MRN: [id number]       Acct: [de-identified]   PCP: No primary care provider on file. Date of Procedure: 22    Pre-operative Diagnosis: 28 y.o. female [id number] at 37w2d   Primary  section 2/2 failed induction of labor   Maternal request  Chronic hypertension with superimposed preeclampsia with severe features  Anemia  Dysmenorrhea  BMI 34    Post-operative Diagnosis: same as above and  living infant Female    Procedure: primary low transverse  section with mirena IUD insertion    Indications: Patient is a 29 yo X9P2135 @ 36w4d here for induction of labor secondary to chronic hypertension with superimposed preeclampsia with severe features. For her induction, she received Pen G, cosme balloon, pitocin, AROM (clr), and cytotec 25 PO x1. Patient did not make any cervical change after being ruptured and on pitocin for 18 hours. Discussed continuation with labor vs  secondary 2/2 failed induction of labor and patient elected to proceed with a primary  section. Patient received TXA x1 preoperatively for bleeding prophylaxis and Ancef/Azithro for antibiotic prophylaxis. Surgeon: Dr. Yani Valdez  Assistants: Harleen Bradford DO, PGY3; Chanel English MD, PGY2    Anesthesia: spinal with duramorph    Procedure Details   The patient was seen pre-operatively. The risks, benefits, complications, treatment options, and expected outcomes were discussed with the patient. The patient concurred with the proposed plan, giving informed consent. The patient was taken to the Operating Room, identified as Chio Villanueva and the procedure verified as  Delivery. A Time Out was held and the above information confirmed. After spinal anesthesia, the patient was draped and prepped in the usual sterile manner.  A Pfannenstiel incision was made and carried down through the subcutaneous tissue to the fascia using scalpel. Fascial incision was made and extended transversely using blunt dissection. The peritoneum was identified and entered bluntly. Blunt dissection were used to take the peritoneum down sharply. Peritoneal incision was extended longitudinally with blunt stretch, bladder retractor was placed. A low transverse uterine incision was made using a new scalpel blade. Blunt stretch on the hysterotomy incision was made and the amniotomy was performed revealing Ruptured clear fluid fluid. Delivered from cephalic presentation was a Live Born female infant. The infant was suctioned, dried and the umbilical cord was clamped and cut after one minute delayed cord clamping. The infant was taken to the warmer and attended by NICU for evaluation. A second section of cord was clamped and cut and sent for gases. Cord blood was obtained for evaluation. The placenta was removed spontaneously with gentle traction and appeared intact, whole, and that the umbilical cord had three vessels noted. Pitocin was started. The uterine outline appeared normal. The uterus was exteriorized and cleaned of all clots and debris. The uterine incision was closed with running unlocked sutures of 0 Vicryl. Before the hysterotomy was closed in its entirety, the Mirena IUD (Lot#: [id number], Exp date: JAN/2025) was opened and loaded into the delivery system. The wand was inserted through the hysterotomy and the button was retracted to the first line to place the IUD at the uterine fundus. The wand was held in place for 10 seconds and then the button was retracted to its final position while the Mirena IUD was moved to the fundus. The strings were trimmed to length of 8cm in standard fashion and tucked into the lower uterine segment with ringed forceps. The hysterotomy closure was completed.  A figure of eight suture with 0 Vicryl was needed in the right side of the hysterotomy incision to obtain excellent hemostasis. Slow oozing still present on the right side of the hysterotomy, as a result, bovie electrocautery was used and hemostasis was achieved. The uterus was reintroduced into the abdominal cavity. Bilateral abdominal gutters were cleared of all clots and debris. Bilateral tubes and ovaries were visualized and appeared normal. The hysterotomy was again inspected and found to be hemostatic. Rectus muscles were inspected and found to be hemostatic. The fascia was then reapproximated with running sutures of 0 Vicryl. The subcuticular space was irrigated copiously. The skin was reapproximated with a4-0 Monocryl. The skin was then cleansed and dressed with a Prevena dressing in sterile fashion. Instrument, sponge, and needle counts were correct prior the abdominal closure and at the conclusion of the case. The urine remained clear throughout the case. Ancef and azithromycin was given for antibiotic prophylaxis. SCDs for DVT prophylaxis remain in place for the post operative period. Dr. Irving Connell was present for the entire operation. Findings:  Viable 6 lb 9 oz female infant in cephalic presentation with Apgars of 8 at 1 minute and 9 at five minutes, normal appearing uterus tubes and ovaries   Quantitative Blood Loss: 340 cc  Total IV Fluids: 2000 cc  Urine output: 350ml clear urine   Drains:  cosme catheter  Specimens:  placenta sent to pathology, cord blood, and cord gases  Instrument and Sponge Count: Correct  Complications: none  Condition: Infant stable, transfer to [address] [place] [address], Mother stable, transfer to post anesthesia recovery    Shamir Jara MD  OB/GYN Resident  2022, 3:00 PM    This dictation was performed by a resident physician and then was thoroughly reviewed by the attending prior to being signed.      Date: 2022  Time: 9:35 AM      Patient Name: Baylee Maria  Patient : 1990  Room/Bed: 4055/1523-64  Admission Date/Time: 2022  4:22 AM        Attending Physician Statement  I have discussed the care of Baylee Maria, including pertinent history and exam findings with the resident. I have reviewed and edited their note in the electronic medical record. The key elements of all parts of the encounter have been performed/reviewed by me . I agree with the assessment, plan and orders as documented by the resident. The level of care submitted represents to the best of my ability the care documented in the medical record today. GC Modifier. This service has been performed in part by a resident under the direction of a teaching physician.         Attending's Name:  Hubert Velazquez DO

## 2022-12-01 LAB
ALBUMIN SERPL-MCNC: 2.7 G/DL (ref 3.5–5.2)
ALBUMIN/GLOBULIN RATIO: 1 (ref 1–2.5)
ALP BLD-CCNC: 125 U/L (ref 35–104)
ALT SERPL-CCNC: 6 U/L (ref 5–33)
ANION GAP SERPL CALCULATED.3IONS-SCNC: 8 MMOL/L (ref 9–17)
AST SERPL-CCNC: 15 U/L
BILIRUB SERPL-MCNC: 0.4 MG/DL (ref 0.3–1.2)
BUN BLDV-MCNC: 4 MG/DL (ref 6–20)
CALCIUM SERPL-MCNC: 6.3 MG/DL (ref 8.6–10.4)
CHLORIDE BLD-SCNC: 100 MMOL/L (ref 98–107)
CO2: 24 MMOL/L (ref 20–31)
CREAT SERPL-MCNC: 0.47 MG/DL (ref 0.5–0.9)
CULTURE: NORMAL
GFR SERPL CREATININE-BSD FRML MDRD: >60 ML/MIN/1.73M2
GLUCOSE BLD-MCNC: 95 MG/DL (ref 70–99)
HCT VFR BLD CALC: 31 % (ref 36.3–47.1)
HEMOGLOBIN: 9.7 G/DL (ref 11.9–15.1)
MCH RBC QN AUTO: 27.8 PG (ref 25.2–33.5)
MCHC RBC AUTO-ENTMCNC: 31.3 G/DL (ref 28.4–34.8)
MCV RBC AUTO: 88.8 FL (ref 82.6–102.9)
NRBC AUTOMATED: 0 PER 100 WBC
PDW BLD-RTO: 14.6 % (ref 11.8–14.4)
PLATELET # BLD: 183 K/UL (ref 138–453)
PMV BLD AUTO: 11.9 FL (ref 8.1–13.5)
POTASSIUM SERPL-SCNC: 3.2 MMOL/L (ref 3.7–5.3)
RBC # BLD: 3.49 M/UL (ref 3.95–5.11)
SODIUM BLD-SCNC: 132 MMOL/L (ref 135–144)
SPECIMEN DESCRIPTION: NORMAL
TOTAL PROTEIN: 5.4 G/DL (ref 6.4–8.3)
WBC # BLD: 10.6 K/UL (ref 3.5–11.3)

## 2022-12-01 PROCEDURE — 2580000003 HC RX 258

## 2022-12-01 PROCEDURE — 6360000002 HC RX W HCPCS

## 2022-12-01 PROCEDURE — 6370000000 HC RX 637 (ALT 250 FOR IP): Performed by: STUDENT IN AN ORGANIZED HEALTH CARE EDUCATION/TRAINING PROGRAM

## 2022-12-01 PROCEDURE — 6360000002 HC RX W HCPCS: Performed by: STUDENT IN AN ORGANIZED HEALTH CARE EDUCATION/TRAINING PROGRAM

## 2022-12-01 PROCEDURE — 99024 POSTOP FOLLOW-UP VISIT: CPT | Performed by: OBSTETRICS & GYNECOLOGY

## 2022-12-01 PROCEDURE — 80053 COMPREHEN METABOLIC PANEL: CPT

## 2022-12-01 PROCEDURE — 2580000003 HC RX 258: Performed by: STUDENT IN AN ORGANIZED HEALTH CARE EDUCATION/TRAINING PROGRAM

## 2022-12-01 PROCEDURE — 36415 COLL VENOUS BLD VENIPUNCTURE: CPT

## 2022-12-01 PROCEDURE — 1220000000 HC SEMI PRIVATE OB R&B

## 2022-12-01 PROCEDURE — 6370000000 HC RX 637 (ALT 250 FOR IP)

## 2022-12-01 PROCEDURE — 85027 COMPLETE CBC AUTOMATED: CPT

## 2022-12-01 RX ORDER — CALCIUM CARBONATE 200(500)MG
1000 TABLET,CHEWABLE ORAL 3 TIMES DAILY PRN
Status: DISCONTINUED | OUTPATIENT
Start: 2022-12-01 | End: 2022-12-02 | Stop reason: HOSPADM

## 2022-12-01 RX ADMIN — ACETAMINOPHEN 1000 MG: 500 TABLET ORAL at 23:25

## 2022-12-01 RX ADMIN — SODIUM CHLORIDE, POTASSIUM CHLORIDE, SODIUM LACTATE AND CALCIUM CHLORIDE: 600; 310; 30; 20 INJECTION, SOLUTION INTRAVENOUS at 05:19

## 2022-12-01 RX ADMIN — KETOROLAC TROMETHAMINE 30 MG: 30 INJECTION, SOLUTION INTRAMUSCULAR at 03:27

## 2022-12-01 RX ADMIN — ACETAMINOPHEN 1000 MG: 500 TABLET ORAL at 14:27

## 2022-12-01 RX ADMIN — DOCUSATE SODIUM 100 MG: 100 CAPSULE ORAL at 20:21

## 2022-12-01 RX ADMIN — MAGNESIUM SULFATE HEPTAHYDRATE 2000 MG/HR: 40 INJECTION, SOLUTION INTRAVENOUS at 01:50

## 2022-12-01 RX ADMIN — SIMETHICONE 80 MG: 80 TABLET, CHEWABLE ORAL at 16:49

## 2022-12-01 RX ADMIN — SODIUM CHLORIDE, PRESERVATIVE FREE 10 ML: 5 INJECTION INTRAVENOUS at 20:24

## 2022-12-01 RX ADMIN — POTASSIUM CHLORIDE 40 MEQ: 1500 TABLET, EXTENDED RELEASE ORAL at 10:35

## 2022-12-01 RX ADMIN — LABETALOL HYDROCHLORIDE 600 MG: 200 TABLET, FILM COATED ORAL at 23:25

## 2022-12-01 RX ADMIN — IBUPROFEN 600 MG: 600 TABLET, FILM COATED ORAL at 23:25

## 2022-12-01 RX ADMIN — LABETALOL HYDROCHLORIDE 600 MG: 200 TABLET, FILM COATED ORAL at 15:11

## 2022-12-01 RX ADMIN — MAGNESIUM SULFATE HEPTAHYDRATE 2000 MG/HR: 40 INJECTION, SOLUTION INTRAVENOUS at 11:51

## 2022-12-01 RX ADMIN — Medication 1 TABLET: at 08:38

## 2022-12-01 RX ADMIN — DOCUSATE SODIUM 100 MG: 100 CAPSULE ORAL at 08:38

## 2022-12-01 RX ADMIN — KETOROLAC TROMETHAMINE 30 MG: 30 INJECTION, SOLUTION INTRAMUSCULAR at 16:07

## 2022-12-01 RX ADMIN — POLYETHYLENE GLYCOL 3350 17 G: 17 POWDER, FOR SOLUTION ORAL at 08:37

## 2022-12-01 RX ADMIN — ACETAMINOPHEN 1000 MG: 500 TABLET ORAL at 05:15

## 2022-12-01 RX ADMIN — ACETAMINOPHEN 1000 MG: 500 TABLET ORAL at 08:38

## 2022-12-01 RX ADMIN — SODIUM CHLORIDE, PRESERVATIVE FREE 10 ML: 5 INJECTION INTRAVENOUS at 08:40

## 2022-12-01 RX ADMIN — LABETALOL HYDROCHLORIDE 600 MG: 200 TABLET, FILM COATED ORAL at 07:49

## 2022-12-01 RX ADMIN — Medication 500 MG: at 14:54

## 2022-12-01 RX ADMIN — KETOROLAC TROMETHAMINE 30 MG: 30 INJECTION, SOLUTION INTRAMUSCULAR at 10:02

## 2022-12-01 ASSESSMENT — PAIN DESCRIPTION - DESCRIPTORS
DESCRIPTORS: SORE
DESCRIPTORS: CRAMPING

## 2022-12-01 ASSESSMENT — PAIN DESCRIPTION - ORIENTATION: ORIENTATION: LOWER

## 2022-12-01 ASSESSMENT — PAIN SCALES - GENERAL
PAINLEVEL_OUTOF10: 1
PAINLEVEL_OUTOF10: 1
PAINLEVEL_OUTOF10: 0
PAINLEVEL_OUTOF10: 0
PAINLEVEL_OUTOF10: 1

## 2022-12-01 ASSESSMENT — PAIN DESCRIPTION - LOCATION
LOCATION: ABDOMEN
LOCATION: ABDOMEN

## 2022-12-01 NOTE — PROGRESS NOTES
Resident Interval Magnesium Sulfate Note    Owen Jacobo is a 28 y.o. female [id number] POD# 3 s/p PLTCS w/ IUD with cHTN w/ CHARLES w/ SFs  The patient is resting comfortably. She denies headache, and vaginal bleeding. She denies any shortness of breath or chest pain. She denies change in her extremities, regarding swelling. Patient's nausea abated with a dose of Zofran and she was able to tolerate liquids and a small amount of crackers. Patient also reports that she passed flatus. She acknowledged bilateral upper abdominal pain that was mild. Continuous Medications:    oxytocin      oxytocin Stopped (11/30/22 0845)    lactated ringers      sodium chloride      magnesium sulfate 2,000 mg/hr (11/30/22 1543)       Vitals:    Vitals:    11/30/22 2014 11/30/22 2120 11/30/22 2230 11/30/22 2326   BP: 129/86 121/78 (!) 142/90 (!) 141/94   Pulse: 77 71 82 72   Resp: 17 18 17 17   Temp: 97.9 °F (36.6 °C)      TempSrc: Oral      SpO2: 98% 97% 97% 97%         Physical Exam:  Chest: clear to auscultation bilaterally  Heart: RRR no murmur  Abdomen: soft, nontender, nondistended, no reproducible pain response in b/l upper quadrants.   Extremities: DTR normal Bilateral upper extremities Right: 4/4   Left: 4/4  Clonus: absent    Urine Output: 150/hr; Clear urine    Labs:  Last Magnesium Level:   Lab Results   Component Value Date/Time    MG 4.0 11/29/2022 05:21 AM       BMP:    Recent Labs     11/28/22  1710 11/29/22  0521 11/29/22  1612 11/30/22  0947    139  --  132*   K 2.8* 2.6* 3.1* 3.2*    103  --  96*   CO2 24 20  --  23   BUN 5* 4*  --  4*   CREATININE 0.56 0.40*  --  0.46*   GLUCOSE 70 69*  --  100*         ASSESSMENT/PLAN  Owen Jacobo is a 28 y.o. female [id number] POD# 3 s/p PLTCS w/ IUD with cHTN w/ CHARLES w/ SFs   - Continue Magnesium Sulfate Treatment 2g/hr, off @ 1410 on 12/1/22   - BPs    - Patient denies any s/s PreE   - UOP    - PreE labs, P/C      - Last IV anti-hypertensive Labetalol Q 8hrs       Cornel Godinez DO  Emergency Medicine Resident on OBGYN Service  12/1/2022, 12:28 AM

## 2022-12-01 NOTE — PROGRESS NOTES
Resident Interval Magnesium Sulfate Note    Yoana Sandoval is a 28 y.o. female [id number] POD# 3 s/p PLTCS w/ IUD with cHTN w/ CHARLES w/ SFs  The patient is resting comfortably in bed while holding her . She denies headache and vaginal bleeding. She denies any shortness of breath or chest pain. She denies change in her extremities, specifically swelling, and no nausea or vomiting symptoms. Patient also reports that she passed flatus. In regards to the earlier bilateral upper abdominal pain, she states that it has resolved completely. Continuous Medications:    oxytocin      oxytocin Stopped (22 0845)    lactated ringers      sodium chloride      magnesium sulfate 2,000 mg/hr (22 0317)       Vitals:    Vitals:    22 0130 22 0230 22 0330 22 0431   BP: 121/74 124/72 128/82 123/77   Pulse: 77 75 77 81   Resp: 17 18 16 17   Temp:       TempSrc:       SpO2: 96% 99% 97% 98%         Physical Exam:  Chest: clear to auscultation bilaterally  Heart: RRR no murmur  Abdomen: soft, nontender, nondistended, no reproducible pain response in b/l upper quadrants.   Extremities: DTR normal Bilateral lower extremities Right: 4/4   Left: 4/4  Clonus: absent    Urine Output: 200/hr; Clear urine    Labs:  Last Magnesium Level:   Lab Results   Component Value Date/Time    MG 4.0 2022 05:21 AM       BMP:    Recent Labs     22  0521 22  1612 22  0947 22  0300     --  132* 132*   K 2.6* 3.1* 3.2* 3.2*     --  96* 100   CO2 20  --  23 24   BUN 4*  --  4* 4*   CREATININE 0.40*  --  0.46* 0.47*   GLUCOSE 69*  --  100* 95         ASSESSMENT/PLAN  Yoana Sandoval is a 28 y.o. female [id number] POD# 3 s/p PLTCS w/ IUD with cHTN w/ CHARLES w/ SFs   - Continue Magnesium Sulfate Treatment 2g/hr, off @ 1410 on 22   - BPs    - Patient denies any s/s PreE   - UOP    - PreE labs, P/C    - Last IV anti-hypertensive Labetalol (Q 8hrs)       Ash Sarabia, DO  Emergency Medicine Resident on OBGYN Service  12/1/2022, 5:11 AM

## 2022-12-01 NOTE — CARE COORDINATION
CASE MANAGEMENT POST-PARTUM TRANSITIONAL CARE PLAN    High-risk pregnancy in third trimester [O09.93]    OB Provider: Dr. Mario Lobato met w/ Tomy Gibbs and GELY Seymour at bedside to discuss DCP. She is S/P C/S on 22 @ 1410 at 36w4d of female    Writer verified name/address/phone number correct on facesheet. She states she lives with 2 children ages 8, 3 and FOB Frankey Menghini. Tomy Gibbs verbalized no problems with transportation to and from doctors appointments or with paying for medications upon discharge home. [employer] insurance correct. Writer notified Tomy Gibbs she has 30 days from date of birth to add  to insurance policy by calling [employer]. She verbalized understanding. Tomy Gibbs confirmed a safe place for infant to sleep at home. Infant name on BC: Regina Caldwell. Infant PCP Dr. Nirali Talbot.      DME: [facility]  HOME CARE: none    Anticipate DC of couplet 22    Readmission Risk              Risk of Unplanned Readmission:  16

## 2022-12-01 NOTE — CARE COORDINATION
Social Work     Sw reviewed medical record (current active problem list) and tox screens and found no concerns. Sw consulted due to mom's hx of cocaine use (2019). All of mom's DIANA's during pregnancy are negative, this is not a current social concern. Sw spoke with mom and dad briefly to explain Sw role, inquire if any needs or concerns, and provide safe sleep education and discuss. Mom denied any needs or questions and informs baby has a safe sleep environment (bassinet). Mom denied any current s/s of anxiety or depression and is aware to reach out to OB if any s/s occur after dc. Mom reports a really good support system and denied any current questions or needs. Mom reports this is her 3rd child (8, 1) and reports she and mark Abreu- present and supportive, reside together with the children. Mom states ped will be Dr. Jersey Waller. Mom also reports she is linked with WIC. Sw encouraged mom to reach out if any issues or concerns arise.

## 2022-12-01 NOTE — CONSULTS
Assisted with waking the baby for a feed. Refined positioning and baby readily latched onto the left breast in cradle hold. Reviewed colostral feeding patterns. Reviewed tandem nursing (breastfeeding siblings that are not twins) and being sure baby nurses first. Encouraged her to call out for assistance as needed.

## 2022-12-01 NOTE — PROGRESS NOTES
POST OPERATIVE DAY # 1    Baylee Maria is a 28 y.o. female   This patient was seen and examined today. Her pregnancy was complicated by:   Patient Active Problem List   Diagnosis    Chronic HTN (meds)    Hx cocaine use    H/O dilation and curettage (2019)    Hx trichomonas    COVID pos 1/15/21    Hypokalemia    Chronic hypertension affecting pregnancy    High-risk pregnancy in third trimester    PLTCS w/ Mirena IUD insertion 11/30/22 F Apg 8/9 Wt 6#9       Today she is doing well without any chief complaint. Her lochia is light. She denies chest pain, shortness of breath, headache, lightheadedness, blurred vision and peripheral edema. She is breast feeding and she denies any signs or symptoms of mastitis. She is ambulating well. She is voiding without difficulty. She currently denies S/S of postpartum depression. Flatus present. Bowel movement absent. She is tolerating solids.     Vital Signs:  Vitals:    12/01/22 0030 12/01/22 0130 12/01/22 0230 12/01/22 0330   BP: (!) 136/92 121/74 124/72 128/82   Pulse: 72 77 75 77   Resp: 16 17 18 16   Temp:       TempSrc:       SpO2: 98% 96% 99% 97%         Urine Input & Output last 24hrs:     Intake/Output Summary (Last 24 hours) at 12/1/2022 0416  Last data filed at 12/1/2022 2792  Gross per 24 hour   Intake 6187.86 ml   Output 3245 ml   Net 2942.86 ml       Physical Exam:  General:  no apparent distress, alert and cooperative  Neurologic:  alert, oriented, normal speech, no focal findings or movement disorder noted  Lungs:  No increased work of breathing  Heart:  Regular rate and rhythm  Abdomen: abdomen soft, non-distended, non-tender  Fundus: non-tender, firm, below umbilicus  Incision: Prevena in place and functioning   Extremities:  no calf tenderness, non edematous    Labs:  Lab Results   Component Value Date    WBC 10.6 12/01/2022    HGB 9.7 (L) 12/01/2022    HCT 31.0 (L) 12/01/2022    MCV 88.8 12/01/2022     12/01/2022       Assessment/Plan:  Piper PIERRE Sendy Pelletier is a [id number] POD # 1 s/p PLTCS w/ IUD   - Doing well, VSS    - Female infant in [address]   - Encourage ambulation and use of incentive spirometer   - D/C cosme catheter and saline lock IV on POD #1    - CBC awaiting  Rh positive/Rubella immune  Breast feeding   - Denies s/s of mastitis  cHTN (meds) w/ CHARLES w/ Sfs   - Her bps have been overall controlled   - Denies s/s of PreE   - Mag > 2g/h off @ 1410 (12/1)    - PreE labs wnl x1, P/C 0.33 (11/29)    - She was started on Labetalol 600 mg TID   - Will monitor closely  Hypokalemia   - K: 2.8 > 2.6 > 3.1 > 3.2   Depression   - Stable on no meds   - Denies SI/HI  Hx of cocaine use   - SW consult PP  Continue post-op care. Counseling Completed:  Secondary Smoke risks and Sudden Infant Death Syndrome were reviewed with recommendations. Infant sleeping, \"back to sleep\" and avoidance of co-sleeping recommendations were reviewed. Signs and Symptoms of Post Partum Depression were reviewed. The patient is to call if any occur. Signs and symptoms of Mastitis were reviewed. The patient is to call if any occur for follow up. Discharge instructions including pelvic rest, incision care, 15 lb weight restriction, no driving with pain medicine and office follow-up were reviewed with patient     Attending Physician: Dr. Almas Nath MD  Ob/Gyn Resident  12/1/2022, 4:16 AM          Attending Physician Statement  I have discussed the care of Peyman Wilson, including pertinent history and exam findings,  with the resident. I have reviewed the key elements of all parts of the encounter with the resident. I agree with the assessment, plan and orders as documented by the resident.   (GE Modifier)    Electronically signed by Katie Nagel MD  12/1/2022  11:20 AM

## 2022-12-01 NOTE — FLOWSHEET NOTE
Pt up to bathroom to get cleaned up and remove cosme. Pt very unsteady on her feet. Dr. Vanna Caldwell notified regarding cosme removal. Cosme is to remain in at this time.

## 2022-12-01 NOTE — PROGRESS NOTES
Resident Interval Magnesium Sulfate Note    Eric Patel is a 28 y.o. female Q1D6254 POD# 2 s/p PLTCS w/ IUD with cHTN w/ CHARLES w/ SFs  The patient is resting comfortably. She denies headache, abdominal pain in the right upper quadrant, and vaginal bleeding. She denies any shortness of breath or chest pain. She denies change in her extremities, regarding swelling.     Continuous Medications:    oxytocin Stopped (11/30/22 0845)    sodium chloride      magnesium sulfate 2,000 mg/hr (11/30/22 1543)    lactated ringers Stopped (11/30/22 0358)    lactated ringers 25 mL/hr at 11/30/22 0545       Vitals:    Vitals:    11/30/22 1700 11/30/22 1730 11/30/22 1800 11/30/22 1900   BP: 125/81  129/68 (!) 140/95   Pulse: 78 68 73 68   Resp: 20 19 18 18   Temp: 97.5 °F (36.4 °C) 97.5 °F (36.4 °C)     TempSrc: Oral      SpO2: 95% 96% 94% 96%         Physical Exam:  Chest: clear to auscultation bilaterally  Heart: RRR no murmur  Abdomen: soft, nontender, nondistended  Extremities: DTR normal Bilateral upper extremities Right: 4/4   Left: 4/4  Clonus: absent    Urine Output: 150/hr; Clear urine    Labs:  Last Magnesium Level:   Lab Results   Component Value Date/Time    MG 4.0 11/29/2022 05:21 AM       BMP:    Recent Labs     11/28/22  1710 11/29/22  0521 11/29/22  1612 11/30/22  0947    139  --  132*   K 2.8* 2.6* 3.1* 3.2*    103  --  96*   CO2 24 20  --  23   BUN 5* 4*  --  4*   CREATININE 0.56 0.40*  --  0.46*   GLUCOSE 70 69*  --  100*       ASSESSMENT/PLAN  Eric Patel is a 28 y.o. female [id number] POD# 2 s/p PLTCS w/ IUD with cHTN w/ CHARLES w/ SFs   - Continue Magnesium Sulfate Treatment 2g/hr, off @ 1410 on 12/1/22   - BPs    - Patient denies any s/s PreE   - UOP    - PreE labs, P/C      - Last IV anti-hypertensive Labetalol Q 8hrs       Dominick Orozco DO  11/30/2022, 7:51 PM

## 2022-12-01 NOTE — PROGRESS NOTES
Resident Interval Magnesium Sulfate Note    Mp Perez is a 28 y.o. female [id number] POD# 3 s/p PLTCS w/ IUD with cHTN w/ CHARLES w/ Sfs    The patient is resting comfortably. She denies headache, visual changes, abdominal pain in the right upper quadrant, and nausea without vomiting for 2 days. She denies any shortness of breath or chest pain. She denies change in her extremities, regarding swelling.     Continuous Medications:    oxytocin      oxytocin Stopped (22 0845)    lactated ringers 75 mL/hr at 22    sodium chloride      magnesium sulfate 2,000 mg/hr (22 0611)       Vitals:    Vitals:    22 0800   BP: 103/63   Pulse: 75   Resp: 17   Temp:    SpO2: 96%       Physical Exam:  Chest: clear to auscultation bilaterally  Heart: RRR no murmur  Abdomen: soft, nontender, nondistended  Extremities: DTR normal Right: 2/4   Left: 2/4  Clonus: absent    Urine Output: 200/hr; Clear urine    Labs:  Last Magnesium Level:   Lab Results   Component Value Date/Time    MG 4.0 2022 05:21 AM       BMP:    Recent Labs     22  0521 22  1612 22  0947 22  0300     --  132* 132*   K 2.6* 3.1* 3.2* 3.2*     --  96* 100   CO2 20  --  23 24   BUN 4*  --  4* 4*   CREATININE 0.40*  --  0.46* 0.47*   GLUCOSE 69*  --  100* 95       ASSESSMENT/PLAN  Mp Perez is a 28 y.o. female [id number]# 3 s/p PLTCS w/ IUD with cHTN w/ CHARLES w/ SFs   - Continue Magnesium Sulfate Treatment 2g/hr, off @ 1410 on 22   - Mag levels q6hrs per provider   - BPs    - Patient denies any s/s PreE   - UOP    - Last IV anti-hypertensive Labetalol (Q 8hrs)    Kaylee Torres DO  Ob/Gyn Resident  2022, 8:25 AM

## 2022-12-01 NOTE — FLOWSHEET NOTE
Pt states that SOB has improved and it feels like the pressure of the gas is loosening up. Pt wants to breast feed the baby then she will get up to the bathroom to get cosme removed.

## 2022-12-02 VITALS
SYSTOLIC BLOOD PRESSURE: 154 MMHG | RESPIRATION RATE: 16 BRPM | HEART RATE: 68 BPM | DIASTOLIC BLOOD PRESSURE: 93 MMHG | TEMPERATURE: 98.4 F | OXYGEN SATURATION: 97 %

## 2022-12-02 PROBLEM — O09.93 HIGH-RISK PREGNANCY IN THIRD TRIMESTER: Status: RESOLVED | Noted: 2022-11-29 | Resolved: 2022-12-02

## 2022-12-02 LAB — SURGICAL PATHOLOGY REPORT: NORMAL

## 2022-12-02 PROCEDURE — 6370000000 HC RX 637 (ALT 250 FOR IP): Performed by: STUDENT IN AN ORGANIZED HEALTH CARE EDUCATION/TRAINING PROGRAM

## 2022-12-02 PROCEDURE — 6370000000 HC RX 637 (ALT 250 FOR IP)

## 2022-12-02 PROCEDURE — 99024 POSTOP FOLLOW-UP VISIT: CPT | Performed by: OBSTETRICS & GYNECOLOGY

## 2022-12-02 RX ORDER — POTASSIUM CHLORIDE 750 MG/1
20 TABLET, EXTENDED RELEASE ORAL DAILY
Qty: 30 TABLET | Refills: 1 | Status: SHIPPED | OUTPATIENT
Start: 2022-12-02 | End: 2023-01-01

## 2022-12-02 RX ADMIN — IBUPROFEN 600 MG: 600 TABLET, FILM COATED ORAL at 11:58

## 2022-12-02 RX ADMIN — LABETALOL HYDROCHLORIDE 600 MG: 200 TABLET, FILM COATED ORAL at 08:18

## 2022-12-02 RX ADMIN — DOCUSATE SODIUM 100 MG: 100 CAPSULE ORAL at 08:19

## 2022-12-02 RX ADMIN — LABETALOL HYDROCHLORIDE 600 MG: 200 TABLET, FILM COATED ORAL at 16:01

## 2022-12-02 RX ADMIN — IBUPROFEN 600 MG: 600 TABLET, FILM COATED ORAL at 05:37

## 2022-12-02 RX ADMIN — Medication 1 TABLET: at 08:19

## 2022-12-02 RX ADMIN — ACETAMINOPHEN 1000 MG: 500 TABLET ORAL at 05:37

## 2022-12-02 RX ADMIN — ACETAMINOPHEN 1000 MG: 500 TABLET ORAL at 11:58

## 2022-12-02 ASSESSMENT — PAIN DESCRIPTION - DESCRIPTORS
DESCRIPTORS: ACHING
DESCRIPTORS: ACHING

## 2022-12-02 ASSESSMENT — PAIN SCALES - GENERAL
PAINLEVEL_OUTOF10: 2
PAINLEVEL_OUTOF10: 1
PAINLEVEL_OUTOF10: 4
PAINLEVEL_OUTOF10: 2

## 2022-12-02 ASSESSMENT — PAIN DESCRIPTION - ORIENTATION
ORIENTATION: LOWER;MID
ORIENTATION: LOWER;MID

## 2022-12-02 ASSESSMENT — PAIN DESCRIPTION - LOCATION
LOCATION: ABDOMEN;INCISION
LOCATION: ABDOMEN;INCISION

## 2022-12-02 ASSESSMENT — PAIN DESCRIPTION - PAIN TYPE
TYPE: SURGICAL PAIN
TYPE: SURGICAL PAIN

## 2022-12-02 NOTE — PROGRESS NOTES
POST OPERATIVE DAY # 2    Ivin Cough is a 28 y.o. female   This patient was seen and examined today. PLTCS on 11/30/22    Her pregnancy was complicated by:   Patient Active Problem List   Diagnosis    Chronic HTN (meds)    Hx cocaine use    H/O dilation and curettage (2019)    Hx trichomonas    COVID pos 1/15/21    Hypokalemia    Chronic hypertension affecting pregnancy    High-risk pregnancy in third trimester    PLTCS w/ Mirena IUD insertion 11/30/22 F Apg 8/9 Wt 6#9       Today she is doing well without any chief complaint. Her lochia is light. She denies chest pain, shortness of breath, headache, lightheadedness, blurred vision and peripheral edema. She is breast feeding and she denies any signs or symptoms of mastitis. She is ambulating well. She is voiding without difficulty. She currently denies S/S of postpartum depression. Flatus present. Bowel movement present. She is tolerating solids.     Vital Signs:  Vitals:    12/01/22 2014 12/01/22 2323 12/01/22 2325 12/02/22 0344   BP: 116/76  (!) 144/98 (!) 141/88   Pulse: 80 79  71   Resp: 16 18  16   Temp: 98.7 °F (37.1 °C) 99.1 °F (37.3 °C)  98.6 °F (37 °C)   TempSrc: Oral Oral  Oral   SpO2: 98% 96%           Urine Input & Output last 24hrs:     Intake/Output Summary (Last 24 hours) at 12/2/2022 1879  Last data filed at 12/1/2022 2014  Gross per 24 hour   Intake 800 ml   Output 1950 ml   Net -1150 ml       Physical Exam:  General:  no apparent distress, alert and cooperative  Neurologic:  alert, oriented, normal speech, no focal findings or movement disorder noted  Lungs:  No increased work of breathing, good air exchange, no cyanosis  Heart:  regular rate  Abdomen: abdomen soft, non-distended, appropriately tender  Fundus: appropriately tender, firm, below umbilicus  Extremities:  no calf tenderness, non edematous  Incision: Prevena in place and functioning appropriately    Labs:  Lab Results   Component Value Date    WBC 10.6 12/01/2022    HGB 9.7 (L) 2022    HCT 31.0 (L) 2022    MCV 88.8 2022     2022       Assessment/Plan:  Mallory Matthew is a [id number] POD # 2 s/p PLTCS w/ IUD   - Doing well, VSS    - female infant in [address]   - Encourage ambulation and use of incentive spirometer   - D/C cosme catheter and saline lock IV on POD #1    - CBC completed, Hgb 9.7   - Motrin/Tylenol/Toma  Rh positive/Rubella immune   - Rhogam not indicated  Breast feeding   - Counseled on s/sx of PreE  cHTN (meds) w/ CHARLES w/ [facility]   - Pressures have been controlled overall   - Currently normotensive   - S/p Mag on 12/   - PreE labs wnl, P/C 0.33 ()   - On Labetalol 600mg TID   - Will monitor closely  Hypokalemia   - K+ 2.8 > 2.6 > 3.1 > 3.2  Depression   - Stable on no meds   - Denies SI/HI  Hx of cocaine use   - SW consult PP  Continue post-op care  BMI 34    Counseling Completed:  Secondary Smoke risks and Sudden Infant Death Syndrome were reviewed with recommendations. Infant sleeping, \"back to sleep\" and avoidance of co-sleeping recommendations were reviewed. Signs and Symptoms of Post Partum Depression were reviewed. The patient is to call if any occur. Signs and symptoms of Mastitis were reviewed. The patient is to call if any occur for follow up. Discharge instructions including pelvic rest, incision care, 15 lb weight restriction, no driving with pain medicine and office follow-up were reviewed with patient     Attending Physician: Dr. Francisco Sharpe, [address] [place] [address]  Ob/Gyn Resident  2022, 6:25 AM    Date: 2022  Time: 9:46 AM      Patient Name: Mallory Matthew  Patient : 1990  Room/Bed: 9139/8349-64  Admission Date/Time: 2022  4:22 AM        Attending Physician Statement  I have discussed the care of Mallory Matthew, including pertinent history and exam findings with the resident. I have reviewed and edited their note in the electronic medical record.  The key elements of all parts of the encounter have been performed/reviewed by me . I agree with the assessment, plan and orders as documented by the resident. The level of care submitted represents to the best of my ability the care documented in the medical record today. GC Modifier. This service has been performed in part by a resident under the direction of a teaching physician. POD#2. Patient is doing well without complaints. Requesting DC to home. DC instructions and follow up reviewed.        Attending's Name:  Subhash Damon DO

## 2022-12-02 NOTE — LACTATION NOTE
Pt states  is nursing well for her, sucking strongly for 20 minutes, hearing swallows. Reviewed feeding patterns and hunger cues. Pt states she is also nursing her toddler still. Reviewed tandem nursing with pt and answered questions. Encouraged pt to call lactation for any questions or concerns. Reviewed regular milk removal for prevention of mastitis as pt had mastitis last pregnancy.

## 2022-12-02 NOTE — PROGRESS NOTES
CLINICAL PHARMACY NOTE: MEDS TO BEDS    Total # of Prescriptions Filled: 1   The following medications were delivered to the patient:  Potassium er 10meq tablet    Additional Documentation: meds delivered to the pt in room 742 on 12.2.22 at 13:00 by Sultana Ching no co pay. 1 visitor in the room at the time of delivery.

## 2022-12-02 NOTE — PROGRESS NOTES
CLINICAL PHARMACY NOTE: MEDS TO BEDS    Total # of Prescriptions Filled: 6   The following medications were delivered to the patient:  Ferrous Sulfate 325 mg tabs  Tylenol Extra Strength 500 mg tabs  Oxycodone Hcl 5 mg tabs  Ibuprofen 600 mg tabs  Senexon-S8.6-50 mg tabs  Zofran 4 mg ODT     Additional Documentation:   Delivered to pt, copay $0.00, delivered on 12/2/22 at 11AM, 1 visitor in room -

## 2022-12-07 ENCOUNTER — PROCEDURE VISIT (OUTPATIENT)
Dept: OBGYN CLINIC | Age: 32
End: 2022-12-07

## 2022-12-07 VITALS — SYSTOLIC BLOOD PRESSURE: 135 MMHG | BODY MASS INDEX: 31.08 KG/M2 | WEIGHT: 204.4 LBS | DIASTOLIC BLOOD PRESSURE: 90 MMHG

## 2022-12-07 DIAGNOSIS — Z48.89 ENCOUNTER FOR POST SURGICAL WOUND CHECK: Primary | ICD-10-CM

## 2022-12-07 PROCEDURE — 99024 POSTOP FOLLOW-UP VISIT: CPT | Performed by: OBSTETRICS & GYNECOLOGY

## 2022-12-07 RX ORDER — IBUPROFEN 600 MG/1
600 TABLET ORAL EVERY 6 HOURS PRN
Qty: 30 TABLET | Refills: 0 | OUTPATIENT
Start: 2022-12-07

## 2022-12-07 NOTE — PROGRESS NOTES
Here for PP C/S incision check. C/O moderate discomfort at times and takes Motrin as needed. Wound vac discontinued and abdominal dressing removed. Area clean, dry and intact. No steri strips were on incision. No redness, drainage or swelling noted. Instructions given to pt on care of area and S/S of infection. States understanding. States she just took her BP medication about 30 min ago. Dr Marilyn Paulino aware and pt to continue taking BP meds as prescribed.

## 2022-12-08 PROBLEM — O61.0 FAILED MEDICAL INDUCTION OF LABOR: Status: ACTIVE | Noted: 2022-12-08

## 2022-12-08 PROBLEM — O14.13 SEVERE PRE-ECLAMPSIA IN THIRD TRIMESTER: Status: ACTIVE | Noted: 2022-12-08

## 2022-12-08 NOTE — TELEPHONE ENCOUNTER
Left detailed VM for pt.
Shows declined on my end - can you let patient know can take OTC motrin and no refill appropriate
This is an OTC medication which is great news - does not need a script
Patient

## 2023-01-11 ENCOUNTER — TELEPHONE (OUTPATIENT)
Dept: OBGYN CLINIC | Age: 33
End: 2023-01-11

## 2023-02-08 ENCOUNTER — TELEPHONE (OUTPATIENT)
Dept: PERINATAL CARE | Age: 33
End: 2023-02-08

## 2023-02-08 NOTE — TELEPHONE ENCOUNTER
Patient is aware of her alpha thalassemia silent carrier result, low risk predicted to fetus (she has delivered). Requests preconception consultation to for further lab review/care coordination.

## 2023-02-09 DIAGNOSIS — D56.3 ALPHA THALASSEMIA SILENT CARRIER: Primary | ICD-10-CM

## 2023-02-16 ENCOUNTER — TELEPHONE (OUTPATIENT)
Dept: OBGYN CLINIC | Age: 33
End: 2023-02-16

## 2023-10-04 ENCOUNTER — TELEPHONE (OUTPATIENT)
Dept: OBGYN CLINIC | Age: 33
End: 2023-10-04

## 2023-11-16 NOTE — PROGRESS NOTES
Pat Mariscal is here at 32w1d for an NST due to chronic hypertension    FHT; Baseline Heart Rate:  150        Accelerations:  present       Long Term Variability:  moderate       Decelerations:  absent         Contraction frequency: No uterine activity      reactive    Plan:  Wkly NST/BPP
The patient is a 68y Female complaining of see chief complaint quote.

## 2025-06-13 NOTE — DISCHARGE SUMMARY
addendum    DX:  33 weeks gestation  Pre-existing hypertension affecting pregnancy third trimester  covid positive      Patient Active Problem List    Diagnosis Date Noted    Pre-existing hypertension affecting pregnancy in third trimester 01/21/2021    33 weeks gestation of pregnancy 01/21/2021    HRP (high risk pregnancy) 01/16/2021    Chronic hypertension during pregnancy 01/15/2021    Hx cocaine use 01/15/2021    H/O dilation and curettage (2019) 01/15/2021    Hx trichomonas 01/15/2021    COVID pos 1/15/21 01/15/2021    S/p Celestone 1/15 and 1/16/21 01/15/2021    33 weeks gestation of pregnancy     Chronic hypertension complicating or reason for care during pregnancy, third trimester 01/07/2021    Abdominal pain 10/28/2020       Past Medical History:   Diagnosis Date    Cocaine abuse ([address]     Hypertension     Trichomonal vulvovaginitis        Condition: stable    Fetal status: reassuring    Diet : regular as tolerated    Activities: no douching intercourse, or tampons. Do not engage in any activities which may cause harm or injury to the abdomen.         Mariann Olson   Home Medication Instructions CAQ:[id number]    Printed on:01/21/21 1347   Medication Information                      ferrous sulfate (IRON 325) 325 (65 Fe) MG tablet  Take 1 tablet by mouth daily (with breakfast)             Prenatal Vit-Fe Fum-FA-Omega (PNV PRENATAL PLUS MULTIVIT+DHA) 27-1 & 312 MG MISC  TAKE 1 TAB & 1 CAPSULE BY MOUTH ONE TIME A DAY             Prenatal Vit-Fe Fumarate-FA (PRENATAL PO)  Take by mouth                  Discharged to: [facility]

## 2025-07-30 NOTE — PROGRESS NOTES
Patient had a VBG in 1994, Dr. Barboza  did an EGD with dilation on 7/1. Patient was prescribed carafate at that time. Patient called today and said she is no longer having the vomiting that she was before but is now experiencing thick mucous at night that she is chocking on, respiratory distress that she is needing to use her inhaler form, nausea, severe abdominal pain on the left side,bloating and constipation. Patient believes this could be caused by the carafate.    I spoke to ABRAHAM Jessica and called patient back to advise her that if she is having respiratory distress or severe abdominal pain, she would need to seek higher level of care at the ER. Patient stated she could not do this due to cost of the visit and the respiratory distress is something she has dealt with before.     I advised the patient to stop taking the carafate at this time and offered an appointment with Dr. Barboza tomorrow as well as Friday. Patient declined and we scheduled her an appointment on 8/6 with Dr. Barboza. I instructed Ms. Matt that if any of the above symptoms worsen- she would need to contact us or just go to the ER. Patient agreed and had no other questions at this time.     Please refer to attached ultrasound report for doctor's evaluation of the clinical information obtained by vital signs, ultrasound, and/or non-stress test along with management recommendation.

## (undated) DEVICE — 3M™ STERI-STRIP™ ANTIMICROBIAL SKIN CLOSURES 1 IN X 5 IN, 25/CAR, 4 CAR/CASE A1848: Brand: 3M™ STERI-STRIP™

## (undated) DEVICE — SOLUTION IV IRRIG WATER 500ML POUR BRL ST 2F7113

## (undated) DEVICE — TOWEL SURG W16XL26IN WHT NONFENESTRATED ST 2 PER PK

## (undated) DEVICE — SUTURE COAT VCRL SZ 0 L36IN ABSRB VLT CTX L48MM TAPERPOINT J370H

## (undated) DEVICE — KENDALL SCD EXPRESS SLEEVES, KNEE LENGTH, MEDIUM: Brand: KENDALL SCD

## (undated) DEVICE — SOLUTION SOD CHL 0.9% 1000ML

## (undated) DEVICE — SWAB MEDICATED TINC BENZ

## (undated) DEVICE — TRAY SPNL 24GA L4IN PENCAN PNCL PNT NDL 0.75% BIPIVCAIN W/